# Patient Record
Sex: MALE | Race: WHITE | NOT HISPANIC OR LATINO | Employment: FULL TIME | URBAN - METROPOLITAN AREA
[De-identification: names, ages, dates, MRNs, and addresses within clinical notes are randomized per-mention and may not be internally consistent; named-entity substitution may affect disease eponyms.]

---

## 2017-01-16 DIAGNOSIS — G47.411 NARCOLEPSY WITH CATAPLEXY: ICD-10-CM

## 2017-01-16 RX ORDER — DEXTROAMPHETAMINE SACCHARATE, AMPHETAMINE ASPARTATE MONOHYDRATE, DEXTROAMPHETAMINE SULFATE AND AMPHETAMINE SULFATE 7.5; 7.5; 7.5; 7.5 MG/1; MG/1; MG/1; MG/1
CAPSULE, EXTENDED RELEASE ORAL
Qty: 180 CAP | Refills: 0 | Status: SHIPPED | OUTPATIENT
Start: 2017-01-16 | End: 2017-02-17 | Stop reason: SDUPTHER

## 2017-01-16 RX ORDER — DEXTROAMPHETAMINE SACCHARATE, AMPHETAMINE ASPARTATE MONOHYDRATE, DEXTROAMPHETAMINE SULFATE AND AMPHETAMINE SULFATE 7.5; 7.5; 7.5; 7.5 MG/1; MG/1; MG/1; MG/1
CAPSULE, EXTENDED RELEASE ORAL
Qty: 180 CAP | Refills: 0 | Status: SHIPPED | OUTPATIENT
Start: 2017-01-16 | End: 2017-05-05 | Stop reason: SDUPTHER

## 2017-01-16 RX ORDER — DEXTROAMPHETAMINE SACCHARATE, AMPHETAMINE ASPARTATE MONOHYDRATE, DEXTROAMPHETAMINE SULFATE AND AMPHETAMINE SULFATE 7.5; 7.5; 7.5; 7.5 MG/1; MG/1; MG/1; MG/1
CAPSULE, EXTENDED RELEASE ORAL
Qty: 180 CAP | Refills: 0 | OUTPATIENT
Start: 2017-01-16

## 2017-01-16 RX ORDER — ZOLPIDEM TARTRATE 12.5 MG/1
12.5 TABLET, FILM COATED, EXTENDED RELEASE ORAL NIGHTLY PRN
Qty: 30 TAB | Refills: 3 | OUTPATIENT
Start: 2017-01-16

## 2017-01-16 RX ORDER — ZOLPIDEM TARTRATE 12.5 MG/1
12.5 TABLET, FILM COATED, EXTENDED RELEASE ORAL NIGHTLY PRN
Qty: 30 TAB | Refills: 3 | Status: SHIPPED | OUTPATIENT
Start: 2017-01-16 | End: 2023-12-01

## 2017-01-16 RX ORDER — ZOLPIDEM TARTRATE 12.5 MG/1
12.5 TABLET, FILM COATED, EXTENDED RELEASE ORAL NIGHTLY PRN
Qty: 30 TAB | Refills: 3 | Status: SHIPPED | OUTPATIENT
Start: 2017-01-16 | End: 2017-05-11

## 2017-01-16 NOTE — TELEPHONE ENCOUNTER
Have we ever prescribed this med? Yes.  If yes, what date? 16    Last OV: 16    Next OV: 17    DX: Narcolepsy with Cataplexy     Medications:   Requested Prescriptions     Pending Prescriptions Disp Refills   • zolpidem (AMBIEN CR) 12.5 MG CR tablet 30 Tab 3     Sig: Take 1 Tab by mouth at bedtime as needed for Sleep (insomnia).   • amphetamine-dextroamphetamine ER (ADDERALL XR) 30 MG XR capsule 180 Cap 0     Si-3 tabs by mouth up to 2-3 times per day as needed for narcolepsy symptoms.     Pt will  both rxs/ap

## 2017-02-16 DIAGNOSIS — G47.411 NARCOLEPSY WITH CATAPLEXY: ICD-10-CM

## 2017-02-16 RX ORDER — DEXTROAMPHETAMINE SACCHARATE, AMPHETAMINE ASPARTATE MONOHYDRATE, DEXTROAMPHETAMINE SULFATE AND AMPHETAMINE SULFATE 7.5; 7.5; 7.5; 7.5 MG/1; MG/1; MG/1; MG/1
CAPSULE, EXTENDED RELEASE ORAL
Qty: 180 CAP | Refills: 0 | OUTPATIENT
Start: 2017-02-16

## 2017-02-17 DIAGNOSIS — G47.411 NARCOLEPSY WITH CATAPLEXY: ICD-10-CM

## 2017-02-17 RX ORDER — DEXTROAMPHETAMINE SACCHARATE, AMPHETAMINE ASPARTATE MONOHYDRATE, DEXTROAMPHETAMINE SULFATE AND AMPHETAMINE SULFATE 7.5; 7.5; 7.5; 7.5 MG/1; MG/1; MG/1; MG/1
CAPSULE, EXTENDED RELEASE ORAL
Qty: 180 CAP | Refills: 0 | Status: SHIPPED | OUTPATIENT
Start: 2017-02-17 | End: 2017-03-23 | Stop reason: SDUPTHER

## 2017-02-17 RX ORDER — DEXTROAMPHETAMINE SACCHARATE, AMPHETAMINE ASPARTATE MONOHYDRATE, DEXTROAMPHETAMINE SULFATE AND AMPHETAMINE SULFATE 7.5; 7.5; 7.5; 7.5 MG/1; MG/1; MG/1; MG/1
CAPSULE, EXTENDED RELEASE ORAL
Qty: 180 CAP | Refills: 0 | Status: SHIPPED
Start: 2017-02-17 | End: 2017-02-17 | Stop reason: SDUPTHER

## 2017-02-17 NOTE — TELEPHONE ENCOUNTER
Caller Name: Johny Jim Gannon                 Call Back Number: [phone] (home) [phone] (work)        Patient approves a detailed voicemail message: yes    Have we ever prescribed this med? Yes.  If yes, what date? 1/16/17    Last OV: 9/28/16    Next OV: 3/29/17    DX: narcolepsy with cataplexy    Medications:  Current Outpatient Prescriptions   Medication Sig Dispense Refill   • amphetamine-dextroamphetamine ER (ADDERALL XR) 30 MG XR capsule 2-3 tabs by mouth up to 2-3 times per day as needed for narcolepsy symptoms. 180 Cap 0   • zolpidem (AMBIEN CR) 12.5 MG CR tablet Take 1 Tab by mouth at bedtime as needed for Sleep (insomnia). 30 Tab 3   • amphetamine-dextroamphetamine ER (ADDERALL XR) 30 MG XR capsule 2-3 tabs by mouth up to 2-3 times per day as needed for narcolepsy symptoms. 180 Cap 0   • zolpidem (AMBIEN CR) 12.5 MG CR tablet Take 1 Tab by mouth at bedtime as needed for Sleep (insomnia). 30 Tab 3   • amphetamine-dextroamphetamine ER (ADDERALL XR) 30 MG XR capsule Take 2-3 tabs by mouth daily 3 times daily as needed for narcolepsy symptoms. 180 Cap 0   • atorvastatin (LIPITOR) 40 MG Tab Take 40 mg by mouth every evening.     • carvedilol (COREG) 25 MG Tab Take 25 mg by mouth 2 times a day, with meals.     • warfarin (COUMADIN) 7.5 MG Tab Take 7.5 mg by mouth every day.     • amlodipine (NORVASC) 10 MG TABS TAKE 1 TABLET BY MOUTH EVERY DAY 30 Tab 6   • simvastatin (ZOCOR) 40 MG TABS TAKE 1 TABLET BY MOUTH AT BEDTIME 90 Tab 2   • enalapril (VASOTEC) 20 MG tablet TAKE 1 TABLET BY MOUTH TWICE A DAY 60 Tab 3   • aspirin EC (ECOTRIN) 81 MG TBEC Take 1 Tab by mouth every day. 30 Each 11   • enalapril (VASOTEC) 10 MG TABS Take 20 mg by mouth 2 times a day.       No current facility-administered medications for this visit.

## 2017-03-23 DIAGNOSIS — G47.411 NARCOLEPSY WITH CATAPLEXY: ICD-10-CM

## 2017-03-23 NOTE — TELEPHONE ENCOUNTER
Have we ever prescribed this med? Yes.  If yes, what date? 2/17/2017    Last OV: 9/28/2016    Next OV: 3/29/2017    DX: Narcolepsy with Cataplexy    Medications: Adderall

## 2017-03-24 RX ORDER — DEXTROAMPHETAMINE SACCHARATE, AMPHETAMINE ASPARTATE MONOHYDRATE, DEXTROAMPHETAMINE SULFATE AND AMPHETAMINE SULFATE 7.5; 7.5; 7.5; 7.5 MG/1; MG/1; MG/1; MG/1
CAPSULE, EXTENDED RELEASE ORAL
Qty: 180 CAP | Refills: 0 | Status: SHIPPED | OUTPATIENT
Start: 2017-03-24 | End: 2017-05-11

## 2017-03-29 ENCOUNTER — SLEEP CENTER VISIT (OUTPATIENT)
Dept: SLEEP MEDICINE | Facility: MEDICAL CENTER | Age: 47
End: 2017-03-29
Payer: COMMERCIAL

## 2017-03-29 VITALS
TEMPERATURE: 98.1 F | BODY MASS INDEX: 25.71 KG/M2 | WEIGHT: 160 LBS | RESPIRATION RATE: 16 BRPM | HEIGHT: 66 IN | HEART RATE: 76 BPM | OXYGEN SATURATION: 96 % | DIASTOLIC BLOOD PRESSURE: 76 MMHG | SYSTOLIC BLOOD PRESSURE: 118 MMHG

## 2017-03-29 DIAGNOSIS — G47.411 NARCOLEPSY WITH CATAPLEXY: ICD-10-CM

## 2017-03-29 PROCEDURE — 99213 OFFICE O/P EST LOW 20 MIN: CPT | Performed by: INTERNAL MEDICINE

## 2017-03-29 RX ORDER — DEXTROAMPHETAMINE SACCHARATE, AMPHETAMINE ASPARTATE MONOHYDRATE, DEXTROAMPHETAMINE SULFATE AND AMPHETAMINE SULFATE 7.5; 7.5; 7.5; 7.5 MG/1; MG/1; MG/1; MG/1
CAPSULE, EXTENDED RELEASE ORAL
Qty: 180 CAP | Refills: 0 | Status: SHIPPED | OUTPATIENT
Start: 2017-03-29 | End: 2017-05-11

## 2017-03-29 NOTE — PROGRESS NOTES
"Chief Complaint   Patient presents with   • Follow-Up     6 mon     HPI: This patient is a 46 y.o. Male who presents for follow-up of narcolepsy with cataplexy. He uses Adderall XR 30 mg 2-3 tablets twice a day to 3 times a day. He is able to stay alert at work without difficulty during normal work shifts. He has been experiencing increased sleep paralysis. Denies overt cataplexy episodes however has had increased sensations of feeling weak when he has a \"déjà vu\" episode. He obtains 8 hours of sleep at night. He periodically uses Ambien CR. He denies palpitations or chest discomfort.  He has a history of cardiomyopathy and PE, status post pacemaker and defibrillator and follows with Dr. Maharaj, with significant improvement in his cardiac function per echocardiography from 15-50%. He has been removed from the cardiac transplant list.      Past Medical History   Diagnosis Date   • Congestive heart failure (CMS-HCC)    • Hypertension    • Bronchitis    • Fall    • Narcolepsy with cataplexy    • STEMI (ST elevation myocardial infarction) pk  trop 150 POBA LAD and DIAG med rx. 12/18/2010   • Heart valve disease    • Abnormal electrocardiogram 7/27/2011   • Chronic systolic congestive heart failure (CMS-HCC) 7/27/2011   • Dizziness 3/31/2011   • Male erectile disorder 8/9/2011   • Mixed hyperlipidemia 8/9/2011   • Hyperlipidemia    • Pulmonary embolism (CMS-HCC)        Social History     Social History   • Marital Status:      Spouse Name: N/A   • Number of Children: N/A   • Years of Education: N/A     Occupational History   • Not on file.     Social History Main Topics   • Smoking status: Former Smoker -- 0.50 packs/day for 15 years     Types: Cigarettes     Quit date: 12/02/2010   • Smokeless tobacco: Never Used      Comment: 1/2 pack/day   • Alcohol Use: 0.0 oz/week     0 Standard drinks or equivalent per week      Comment: occasional   • Drug Use: No   • Sexual Activity: Not on file     Other Topics " Concern   • Not on file     Social History Narrative       History reviewed. No pertinent family history.    Current Outpatient Prescriptions on File Prior to Visit   Medication Sig Dispense Refill   • amphetamine-dextroamphetamine ER (ADDERALL XR) 30 MG XR capsule 2-3 tabs by mouth up to 2-3 times per day as needed for narcolepsy symptoms. 180 Cap 0   • amphetamine-dextroamphetamine ER (ADDERALL XR) 30 MG XR capsule 2-3 tabs by mouth up to 2-3 times per day as needed for narcolepsy symptoms. 180 Cap 0   • zolpidem (AMBIEN CR) 12.5 MG CR tablet Take 1 Tab by mouth at bedtime as needed for Sleep (insomnia). 30 Tab 3   • zolpidem (AMBIEN CR) 12.5 MG CR tablet Take 1 Tab by mouth at bedtime as needed for Sleep (insomnia). 30 Tab 3   • atorvastatin (LIPITOR) 40 MG Tab Take 40 mg by mouth every evening.     • carvedilol (COREG) 25 MG Tab Take 25 mg by mouth 2 times a day, with meals.     • warfarin (COUMADIN) 7.5 MG Tab Take 7.5 mg by mouth every day.     • amlodipine (NORVASC) 10 MG TABS TAKE 1 TABLET BY MOUTH EVERY DAY 30 Tab 6   • simvastatin (ZOCOR) 40 MG TABS TAKE 1 TABLET BY MOUTH AT BEDTIME 90 Tab 2   • aspirin EC (ECOTRIN) 81 MG TBEC Take 1 Tab by mouth every day. 30 Each 11   • enalapril (VASOTEC) 20 MG tablet TAKE 1 TABLET BY MOUTH TWICE A DAY 60 Tab 3   • enalapril (VASOTEC) 10 MG TABS Take 20 mg by mouth 2 times a day.       No current facility-administered medications on file prior to visit.       Allergies: Review of patient's allergies indicates no known allergies.    ROS:   Constitutional: Denies fevers, chills, night sweats, fatigue or weight loss  Eyes: Denies vision loss, pain, drainage, double vision  Ears, Nose, Throat: Denies earache, difficulty hearing, tinnitus, nasal congestion, hoarseness  Cardiovascular: Denies chest pain, tightness, palpitations, orthopnea or edema  Respiratory: Denies shortness of breath, cough, wheezing, hemoptysis  Sleep: As in history of present illness  GI: Denies  "heartburn, dysphagia, nausea, abdominal pain, diarrhea or constipation  : Denies frequent urination, hematuria, discharge or painful urination  Musculoskeletal: Denies back pain, painful joints, sore muscles  Neurological: Denies weakness or headaches  Skin: No rashes    Blood pressure 118/76, pulse 76, temperature 36.7 °C (98.1 °F), resp. rate 16, height 1.676 m (5' 5.98\"), weight 72.576 kg (160 lb), SpO2 96 %.  Multi-Ox Readings  Multi Ox #1     O2 sat % at rest     O2 sat % on exertion     O2 sat average on exertion     Multi Ox #2     O2 sat % at rest     O2 sat % on exertion     O2 sat average on exertion       Oxygen Use     Oxygen Frequency     Duration of need     Is the patient mobile within the home?     CPAP Use?     BIPAP Use?     Servo Titration         Physical Exam:  Appearance: Well-nourished, well-developed, in no acute distress  HEENT: Normocephalic, atraumatic, white sclera, PERRLA, oropharynx clear  Neck: No adenopathy or masses  Respiratory: no intercostal retractions or accessory muscle use  Lungs auscultation: Clear to auscultation bilaterally  Cardiovascular: Regular rate rhythm. No murmurs, rubs or gallops.  No LE edema  Abdomen: soft, nondistended  Gait: Normal  Digits: No clubbing, cyanosis  Motor: No focal deficits  Orientation: Oriented to time, person and place    Diagnosis:  1. Narcolepsy with cataplexy  amphetamine-dextroamphetamine ER (ADDERALL XR) 30 MG XR capsule       Plan:  Johny is experiencing increased sleep paralysis, ? Increased cataplexy. He would be a candidate for Xyrem however is reluctant to start any new narcolepsy medications at this time, and we will continue to monitor clinically. He was encouraged to contact me should symptoms worsen.  Continue Adderall at above doses with prescription provided.  Maintain good sleep hygiene, with 8 hours of sleep nightly.  Return in about 6 months (around 9/29/2017).        "

## 2017-03-29 NOTE — MR AVS SNAPSHOT
"        Johny Toledo   3/29/2017 12:40 PM   Sleep Center Visit   MRN: [id number]    Department:  [facility]   Dept Phone:  [phone]    Description:  Male : 1970   Provider:  Sumi Sharp M.D.           Reason for Visit     Follow-Up 6 mon      Allergies as of 3/29/2017     No Known Allergies      You were diagnosed with     Narcolepsy with cataplexy   [[id number]]         Vital Signs     Blood Pressure Pulse Temperature Respirations Height Weight    118/76 mmHg 76 36.7 °C (98.1 °F) 16 1.676 m (5' 5.98\") 72.576 kg (160 lb)    Body Mass Index Oxygen Saturation Smoking Status             25.84 kg/m2 96% Former Smoker         Basic Information     Date Of Birth Sex Race Ethnicity Preferred Language    1970 Male  Non- English      Your appointments     Sep 29, 2017 12:40 PM   Follow UP with Sumi Sharp M.D.   [facility] (--)    [address] [address]   [phone]              Problem List              ICD-10-CM Priority Class Noted - Resolved    STEMI (ST elevation myocardial infarction) pk  trop 150 POBA LAD and DIAG med rx. I21.3   2010 - Present    Cardiomyopathy, nonischemic EF 15% no ascvd, query spasm vs. intracornary thrombus. I42.9   2010 - Present    Sleep apnea G47.30   2010 - Present    Narcolepsy with cataplexy G47.411   2010 - Present    HTN (hypertension) I10   2010 - Present    Abnormal electrocardiogram (Chronic) R94.31   2011 - Present    Chronic systolic congestive heart failure (CMS-HCC) (Chronic) I50.22   2011 - Present    Dizziness (Chronic) R42   3/31/2011 - Present    Male erectile disorder (Chronic) N52.9   2011 - Present    Mixed hyperlipidemia (Chronic) E78.2   2011 - Present    CAD (coronary artery disease) I25.10   2012 - Present      Health Maintenance        Date Due Completion Dates    IMM DTaP/Tdap/Td Vaccine (1 - Tdap) 1989 ---    IMM " INFLUENZA (1) 9/1/2016 10/4/2009            Current Immunizations     Influenza TIV (IM) 10/4/2009    Pneumococcal polysaccharide vaccine (PPSV-23) 8/2/2011  9:58 PM      Below and/or attached are the medications your provider expects you to take. Review all of your home medications and newly ordered medications with your provider and/or pharmacist. Follow medication instructions as directed by your provider and/or pharmacist. Please keep your medication list with you and share with your provider. Update the information when medications are discontinued, doses are changed, or new medications (including over-the-counter products) are added; and carry medication information at all times in the event of emergency situations     Allergies:  No Known Allergies          Medications  Valid as of: March 29, 2017 - 12:54 PM    Generic Name Brand Name Tablet Size Instructions for use    AmLODIPine Besylate (Tab) NORVASC 10 MG TAKE 1 TABLET BY MOUTH EVERY DAY        Amphetamine-Dextroamphetamine (CAPSULE SR 24 HR) ADDERALL XR 30 MG 2–3 tabs by mouth up to 2–3 times per day as needed for narcolepsy symptoms.        Amphetamine-Dextroamphetamine (CAPSULE SR 24 HR) ADDERALL XR 30 MG 2–3 tabs by mouth up to 2–3 times per day as needed for narcolepsy symptoms.        Amphetamine-Dextroamphetamine (CAPSULE SR 24 HR) ADDERALL XR 30 MG Take 2–3 tabs by mouth daily 3 times daily as needed for narcolepsy symptoms.        Aspirin (Tablet Delayed Response) ECOTRIN 81 MG Take 1 Tab by mouth every day.        Atorvastatin Calcium (Tab) LIPITOR 40 MG Take 40 mg by mouth every evening.        Carvedilol (Tab) COREG 25 MG Take 25 mg by mouth 2 times a day, with meals.        Enalapril Maleate (Tab) VASOTEC 10 MG Take 20 mg by mouth 2 times a day.        Enalapril Maleate (Tab) VASOTEC 20 MG TAKE 1 TABLET BY MOUTH TWICE A DAY        Simvastatin (Tab) ZOCOR 40 MG TAKE 1 TABLET BY MOUTH AT BEDTIME        Warfarin Sodium (Tab) COUMADIN 7.5 MG Take  7.5 mg by mouth every day.        Zolpidem Tartrate (Tab CR) AMBIEN CR 12.5 MG Take 1 Tab by mouth at bedtime as needed for Sleep (insomnia).        Zolpidem Tartrate (Tab CR) AMBIEN CR 12.5 MG Take 1 Tab by mouth at bedtime as needed for Sleep (insomnia).        .                 Medicines prescribed today were sent to:     [id number] - [place], [place] - [address] AT [address] [place] [address]    Phone: [phone] Fax: [phone]    Open 24 Hours?: No      Medication refill instructions:       If your prescription bottle indicates you have medication refills left, it is not necessary to call your provider’s office. Please contact your pharmacy and they will refill your medication.    If your prescription bottle indicates you do not have any refills left, you may request refills at any time through one of the following ways: The online TheReadingRoom system (except Urgent Care), by calling your provider’s office, or by asking your pharmacy to contact your provider’s office with a refill request. Medication refills are processed only during regular business hours and may not be available until the next business day. Your provider may request additional information or to have a follow-up visit with you prior to refilling your medication.   *Please Note: Medication refills are assigned a new Rx number when refilled electronically. Your pharmacy may indicate that no refills were authorized even though a new prescription for the same medication is available at the pharmacy. Please request the medicine by name with the pharmacy before contacting your provider for a refill.           TheReadingRoom Access Code: [email or web address]  Expires: 3/31/2017  3:14 PM    Your email address is not on file at [facility].  Email Addresses are required for you to sign up for TheReadingRoom, please contact [phone] to verify your personal information and to provide your email address prior to  attempting to register for TherapeuticsMDt.    Johny Toledo  [address] [place] [address]    Zoomabethart  A secure, online tool to manage your health information     [employer]’s TherapeuticsMDt® is a secure, online tool that connects you to your personalized health information from the privacy of your home -- day or night - making it very easy for you to manage your healthcare. Once the activation process is completed, you can even access your medical information using the [employer] elva, which is available for free in the Apple Elva store or Google Play store.     To learn more about [employer], visit [email or web address]    There are two levels of access available (as shown below):   My Chart Features  [facility] Primary Care Doctor [facility]  Specialists [facility]  Urgent  Care Non-[facility] Primary Care Doctor   Email your healthcare team securely and privately 24/7 X X X    Manage appointments: schedule your next appointment; view details of past/upcoming appointments X      Request prescription refills. X      View recent personal medical records, including lab and immunizations X X X X   View health record, including health history, allergies, medications X X X X   Read reports about your outpatient visits, procedures, consult and ER notes X X X X   See your discharge summary, which is a recap of your hospital and/or ER visit that includes your diagnosis, lab results, and care plan X X  X     How to register for TherapeuticsMDt:  Once your e-mail address has been verified, follow the following steps to sign up for TherapeuticsMDt.     1. Go to  [email or web address]  2. Click on the Sign Up Now box, which takes you to the New Member Sign Up page. You will need to provide the following information:  a. Enter your [employer] Access Code exactly as it appears at the top of this page. (You will not need to use this code after you’ve completed the sign-up process. If you do not sign up before the expiration date, you must request a new code.)   b. Enter  your date of birth.   c. Enter your home email address.   d. Click Submit, and follow the next screen’s instructions.  3. Create a [employer]ner ID. This will be your [employer] login ID and cannot be changed, so think of one that is secure and easy to remember.  4. Create a Virtual Call Centert password. You can change your password at any time.  5. Enter your Password Reset Question and Answer. This can be used at a later time if you forget your password.   6. Enter your e-mail address. This allows you to receive e-mail notifications when new information is available in [employer].  7. Click Sign Up. You can now view your health information.    For assistance activating your [employer] account, call [phone]

## 2017-04-15 ENCOUNTER — HOSPITAL ENCOUNTER (EMERGENCY)
Facility: MEDICAL CENTER | Age: 47
End: 2017-04-15
Payer: COMMERCIAL

## 2017-04-15 VITALS
RESPIRATION RATE: 16 BRPM | BODY MASS INDEX: 26.82 KG/M2 | HEIGHT: 66 IN | OXYGEN SATURATION: 98 % | DIASTOLIC BLOOD PRESSURE: 80 MMHG | SYSTOLIC BLOOD PRESSURE: 131 MMHG | TEMPERATURE: 98.3 F | WEIGHT: 166.89 LBS | HEART RATE: 79 BPM

## 2017-04-15 PROCEDURE — 302449 STATCHG TRIAGE ONLY (STATISTIC)

## 2017-04-15 ASSESSMENT — PAIN SCALES - GENERAL: PAINLEVEL_OUTOF10: 0

## 2017-04-16 NOTE — ED NOTES
Johny Jim Toledo  46 y.o. male  Chief Complaint   Patient presents with   • Coagulation Disorder     Increased INR (7.8) tonight. Pt taking new medication (fenofibrate).      Pt amb to triage with steady gait for above complaint. Pt is taking coumadin.   Pt denies headaches, GLF, or head trauma. No obvious signs of bleeding observed.  Pt is alert and oriented, speaking in full sentences, follows commands and responds appropriately to questions.  Pt placed in lobby. Pt educated on triage process. Pt encouraged to alert staff for any changes.

## 2017-05-05 DIAGNOSIS — G47.411 NARCOLEPSY WITH CATAPLEXY(347.01): ICD-10-CM

## 2017-05-05 RX ORDER — DEXTROAMPHETAMINE SACCHARATE, AMPHETAMINE ASPARTATE MONOHYDRATE, DEXTROAMPHETAMINE SULFATE AND AMPHETAMINE SULFATE 7.5; 7.5; 7.5; 7.5 MG/1; MG/1; MG/1; MG/1
CAPSULE, EXTENDED RELEASE ORAL
Qty: 180 CAP | Refills: 0 | Status: SHIPPED | OUTPATIENT
Start: 2017-05-05 | End: 2017-06-01 | Stop reason: SDUPTHER

## 2017-05-05 NOTE — TELEPHONE ENCOUNTER
Pt is requesting a refill of rx Adderall XR 30 MG.  Last prescribed 3/29/17    Last OV: 3/29/17  Next OV: 9/29/17  Narcolepsy with cataplexy  Adderall XR 30 MG

## 2017-05-11 ENCOUNTER — ANTICOAGULATION VISIT (OUTPATIENT)
Dept: VASCULAR LAB | Facility: MEDICAL CENTER | Age: 47
End: 2017-05-11
Attending: INTERNAL MEDICINE
Payer: COMMERCIAL

## 2017-05-11 DIAGNOSIS — I23.6 APICAL MURAL THROMBUS FOLLOWING MI (HCC): ICD-10-CM

## 2017-05-11 LAB — INR PPP: 2.6 (ref 2–3.5)

## 2017-05-11 PROCEDURE — 99213 OFFICE O/P EST LOW 20 MIN: CPT

## 2017-05-11 PROCEDURE — 85610 PROTHROMBIN TIME: CPT

## 2017-05-11 NOTE — PROGRESS NOTES
Anticoagulation Summary as of 5/11/2017     INR goal 2.0-3.0   Selected INR 2.6 (5/11/2017)   Maintenance plan 7.5 mg (7.5 mg x 1) on Sun, Thu; 3.75 mg (7.5 mg x 0.5) all other days   Weekly total 33.75 mg   Plan last modified Jayden Rothman, PHARMD (5/11/2017)   Next INR check 5/25/2017   Target end date Indefinite    Indications   Apical mural thrombus following MI [I51.3  I21.3]         Anticoagulation Episode Summary     INR check location     Preferred lab     Send INR reminders to     Comments       Anticoagulation Care Providers     Provider Role Specialty Phone number    EMI Jorgensen Referring Family Medicine [phone]    [facility] Responsible  [phone]        Anticoagulation Patient Findings    Pt is new to [facility], but not new to warfarin.  Discussed indication for warfarin therapy and INR goal range. Explained our services, hours of operation, warfarin therapy, potential SE, potential DI. Discussed diet at length, with an emphasis on foods rich in vitamin K.  Discussed monitoring parameters, such as blood in urine, blood in stool, discussed what to do if a dose is missed, or suspected as missed.  Emphasized importance of compliance.      Pt had recently seen the ER for an INR of 8.  Since [facility] had closed, the patient had a hard time obtaining help in dosing of his warfarin.  Also, pt was started on fenofibrate at that time that could have contributed to elevated INR. Pt has stopped fenofibrate and resumed his usual warfarin regimen for over 1 week.      Med rec performed, history of MI, continue ASA.    Patient's INR was therapeutic today in clinic.     Pt denies any unusual s/s of bleeding, bruising, clotting or any changes to diet or medications.  Confirmed dosing regimen.  Pt is to continue with current warfarin dosing regimen.  Follow up in 2 weeks as patient has been very stable on this dosing for years (per pt).  States he was seen every 4-5 weeks at [facility].      Added [facility] to care team    Jayden Rothman, PHARMD     CC: Dr Michael Bloch

## 2017-05-11 NOTE — MR AVS SNAPSHOT
Johny Toledo   2017 2:45 PM   Anticoagulation Visit   MRN: [id number]    Department:  Vascular Medicine   Dept Phone:  [phone]    Description:  Male : 1970   Provider:  V EXAM 5           Allergies as of 2017     No Known Allergies      You were diagnosed with     Apical mural thrombus following MI   [[id number]]         Vital Signs     Smoking Status                   Former Smoker           Basic Information     Date Of Birth Sex Race Ethnicity Preferred Language    1970 Male  Non- English      Your appointments     May 11, 2017  2:45 PM   New Patient with IHVH EXAM 5   [facility]  (--)    [address] [place] [address]   [phone]            May 25, 2017 11:15 AM   Established Patient with IHVH EXAM 4   [facility]  (--)    [address] [place] [address]   [phone]            Sep 29, 2017 12:40 PM   Follow UP with Sumi Sharp M.D.   [facility] (--)    [address]  [place] [id number] [phone]              Problem List              ICD-10-CM Priority Class Noted - Resolved    STEMI (ST elevation myocardial infarction) pk  trop 150 POBA LAD and DIAG med rx. I21.3   2010 - Present    Cardiomyopathy, nonischemic EF 15% no ascvd, query spasm vs. intracornary thrombus. I42.8   2010 - Present    Sleep apnea G47.30   2010 - Present    Narcolepsy with cataplexy G47.411   2010 - Present    HTN (hypertension) I10   2010 - Present    Abnormal electrocardiogram (Chronic) R94.31   2011 - Present    Chronic systolic congestive heart failure (CMS-HCC) (Chronic) I50.22   2011 - Present    Dizziness (Chronic) R42   3/31/2011 - Present    Male erectile disorder (Chronic) N52.9   2011 - Present    Mixed hyperlipidemia (Chronic) E78.2   2011 - Present    CAD  (coronary artery disease) I25.10   7/5/2012 - Present    Apical mural thrombus following MI I51.3, I21.3   5/11/2017 - Present      Health Maintenance        Date Due Completion Dates    IMM DTaP/Tdap/Td Vaccine (1 - Tdap) 8/18/1989 ---            Results     POCT Protime      Component    INR    2.6                        Current Immunizations     Influenza TIV (IM) 10/4/2009    Pneumococcal polysaccharide vaccine (PPSV-23) 8/2/2011  9:58 PM      Below and/or attached are the medications your provider expects you to take. Review all of your home medications and newly ordered medications with your provider and/or pharmacist. Follow medication instructions as directed by your provider and/or pharmacist. Please keep your medication list with you and share with your provider. Update the information when medications are discontinued, doses are changed, or new medications (including over-the-counter products) are added; and carry medication information at all times in the event of emergency situations     Allergies:  No Known Allergies          Medications  Valid as of: May 11, 2017 -  2:43 PM    Generic Name Brand Name Tablet Size Instructions for use    AmLODIPine Besylate (Tab) NORVASC 10 MG TAKE 1 TABLET BY MOUTH EVERY DAY        Amphetamine-Dextroamphetamine (CAPSULE SR 24 HR) ADDERALL XR 30 MG 2–3 tabs by mouth up to 2–3 times per day as needed for narcolepsy symptoms.        Amphetamine-Dextroamphetamine (CAPSULE SR 24 HR) ADDERALL XR 30 MG Take 2–3 tabs by mouth daily 3 times daily as needed for narcolepsy symptoms.        Amphetamine-Dextroamphetamine (CAPSULE SR 24 HR) ADDERALL XR 30 MG 2–3 tabs by mouth up to 2–3 times per day as needed for narcolepsy symptoms.        Aspirin (Tablet Delayed Response) ECOTRIN 81 MG Take 1 Tab by mouth every day.        Atorvastatin Calcium (Tab) LIPITOR 40 MG Take 40 mg by mouth every evening.        Carvedilol (Tab) COREG 25 MG Take 25 mg by mouth 2 times a day, with meals.           Enalapril Maleate (Tab) VASOTEC 10 MG Take 20 mg by mouth 2 times a day.        Enalapril Maleate (Tab) VASOTEC 20 MG TAKE 1 TABLET BY MOUTH TWICE A DAY        Simvastatin (Tab) ZOCOR 40 MG TAKE 1 TABLET BY MOUTH AT BEDTIME        Warfarin Sodium (Tab) COUMADIN 7.5 MG Take 7.5 mg by mouth every day.        Zolpidem Tartrate (Tab CR) AMBIEN CR 12.5 MG Take 1 Tab by mouth at bedtime as needed for Sleep (insomnia).        Zolpidem Tartrate (Tab CR) AMBIEN CR 12.5 MG Take 1 Tab by mouth at bedtime as needed for Sleep (insomnia).        .                 Medicines prescribed today were sent to:     [facility] #[id number] - [place], [place] - [address] AT [address] [place] [address]    Phone: [phone] Fax: [phone]    Open 24 Hours?: No      Medication refill instructions:       If your prescription bottle indicates you have medication refills left, it is not necessary to call your provider’s office. Please contact your pharmacy and they will refill your medication.    If your prescription bottle indicates you do not have any refills left, you may request refills at any time through one of the following ways: The online Beep system (except Urgent Care), by calling your provider’s office, or by asking your pharmacy to contact your provider’s office with a refill request. Medication refills are processed only during regular business hours and may not be available until the next business day. Your provider may request additional information or to have a follow-up visit with you prior to refilling your medication.   *Please Note: Medication refills are assigned a new Rx number when refilled electronically. Your pharmacy may indicate that no refills were authorized even though a new prescription for the same medication is available at the pharmacy. Please request the medicine by name with the pharmacy before contacting your provider for a refill.        Warfarin Dosing  Calendar   May 2017 Details    Sun Mon Tue Wed Thu Fri Sat      1               2               3               4               5               6                 7               8               9               10               11   2.6   7.5 mg   See details      12      3.75 mg         13      3.75 mg           14      7.5 mg         15      3.75 mg         16      3.75 mg         17      3.75 mg         18      7.5 mg         19      3.75 mg         20      3.75 mg           21      7.5 mg         22      3.75 mg         23      3.75 mg         24      3.75 mg         25      7.5 mg         26               27                 28               29               30               31                   Date Details   05/11 This INR check   INR: 2.6       Date of next INR:  5/25/2017         How to take your warfarin dose     To take:  3.75 mg Take 0.5 of a 7.5 mg tablet.    To take:  7.5 mg Take 1 of the 7.5 mg tablets.              [employer] Access Code: [email or web address]  Expires: 5/26/2017  1:12 PM    Your email address is not on file at [employer].  Email Addresses are required for you to sign up for [employer], please contact [phone] to verify your personal information and to provide your email address prior to attempting to register for [employer].    Johny Toledo  [address] [place] [address]    [employer]  A secure, online tool to manage your health information     [employer]’s [employer]® is a secure, online tool that connects you to your personalized health information from the privacy of your home -- day or night - making it very easy for you to manage your healthcare. Once the activation process is completed, you can even access your medical information using the [employer] elva, which is available for free in the Apple Elva store or Google Play store.     To learn more about [employer], visit [email or web address]    There are two levels of access available (as shown below):   My Chart Features  [facility]  Primary Care Doctor [facility]  Urgent  Care [facility] Primary Care Doctor   Email your healthcare team securely and privately 24/7 X X X    Manage appointments: schedule your next appointment; view details of past/upcoming appointments X      Request prescription refills. X      View recent personal medical records, including lab and immunizations X X X X   View health record, including health history, allergies, medications X X X X   Read reports about your outpatient visits, procedures, consult and ER notes X X X X   See your discharge summary, which is a recap of your hospital and/or ER visit that includes your diagnosis, lab results, and care plan X X  X     How to register for cottonTracks:  Once your e-mail address has been verified, follow the following steps to sign up for [employer]Tracks.     1. Go to  [email or web address]  2. Click on the Sign Up Now box, which takes you to the New Member Sign Up page. You will need to provide the following information:  a. Enter your cottonTracks Access Code exactly as it appears at the top of this page. (You will not need to use this code after you’ve completed the sign-up process. If you do not sign up before the expiration date, you must request a new code.)   b. Enter your date of birth.   c. Enter your home email address.   d. Click Submit, and follow the next screen’s instructions.  3. Create a cottonTracks ID. This will be your cottonTracks login ID and cannot be changed, so think of one that is secure and easy to remember.  4. Create a cottonTracks password. You can change your password at any time.  5. Enter your Password Reset Question and Answer. This can be used at a later time if you forget your password.   6. Enter your e-mail address. This allows you to receive e-mail notifications when new information is available in cottonTracks.  7. Click Sign Up. You can now view your health information.    For assistance activating your cottonTracks account, call [phone]

## 2017-05-15 LAB — INR BLD: 2.6 (ref 0.9–1.2)

## 2017-05-25 ENCOUNTER — ANTICOAGULATION VISIT (OUTPATIENT)
Dept: VASCULAR LAB | Facility: MEDICAL CENTER | Age: 47
End: 2017-05-25
Attending: INTERNAL MEDICINE
Payer: COMMERCIAL

## 2017-05-25 DIAGNOSIS — I23.6 APICAL MURAL THROMBUS FOLLOWING MI (HCC): ICD-10-CM

## 2017-05-25 LAB — INR PPP: 3 (ref 2–3.5)

## 2017-05-25 PROCEDURE — 99211 OFF/OP EST MAY X REQ PHY/QHP: CPT

## 2017-05-25 PROCEDURE — 85610 PROTHROMBIN TIME: CPT

## 2017-05-25 NOTE — PROGRESS NOTES
Anticoagulation Summary as of 5/25/2017     INR goal 2.0-3.0   Selected INR 3.0 (5/25/2017)   Maintenance plan 7.5 mg (7.5 mg x 1) on Sun, Thu; 3.75 mg (7.5 mg x 0.5) all other days   Weekly total 33.75 mg   Plan last modified Jayden Rothman, PHARMD (5/11/2017)   Next INR check 6/15/2017   Target end date Indefinite    Indications   Apical mural thrombus following MI [I51.3  I21.3]         Anticoagulation Episode Summary     INR check location     Preferred lab     Send INR reminders to     Comments       Anticoagulation Care Providers     Provider Role Specialty Phone number    EMI Jorgensen Referring Family Medicine [phone]    [facility] An[facility] Responsible  [phone]        Anticoagulation Patient Findings      Current Outpatient Prescriptions on File Prior to Visit   Medication Sig Dispense Refill   • amphetamine-dextroamphetamine ER (ADDERALL XR) 30 MG XR capsule 2-3 tabs by mouth up to 2-3 times per day as needed for narcolepsy symptoms. 180 Cap 0   • zolpidem (AMBIEN CR) 12.5 MG CR tablet Take 1 Tab by mouth at bedtime as needed for Sleep (insomnia). 30 Tab 3   • atorvastatin (LIPITOR) 40 MG Tab Take 40 mg by mouth every evening.     • carvedilol (COREG) 25 MG Tab Take 25 mg by mouth 2 times a day, with meals.     • warfarin (COUMADIN) 7.5 MG Tab Take 7.5 mg by mouth every day.     • amlodipine (NORVASC) 10 MG TABS TAKE 1 TABLET BY MOUTH EVERY DAY 30 Tab 6   • simvastatin (ZOCOR) 40 MG TABS TAKE 1 TABLET BY MOUTH AT BEDTIME 90 Tab 2   • enalapril (VASOTEC) 20 MG tablet TAKE 1 TABLET BY MOUTH TWICE A DAY 60 Tab 3   • aspirin EC (ECOTRIN) 81 MG TBEC Take 1 Tab by mouth every day. 30 Each 11   • enalapril (VASOTEC) 10 MG TABS Take 20 mg by mouth 2 times a day.       No current facility-administered medications on file prior to visit.       Lab Results   Component Value Date/Time    SODIUM 139 08/03/2011 03:00 AM    POTASSIUM 3.7 08/03/2011 03:00 AM    CHLORIDE 105 08/03/2011 03:00  AM    CO2 26 08/03/2011 03:00 AM    GLUCOSE 92 08/03/2011 03:00 AM    BUN 12 08/03/2011 03:00 AM    CREATININE 0.85 08/03/2011 03:00 AM        Johny Toledo seen in clinic today  INR  therapeutic.    Denies signs/symptoms of bleeding and/or thrombosis.    Denies changes to diet or medications.   Follow up appointment in 3 week(s).    Continue weekly warfarin dose as noted     Roger Richardson, PHARMD

## 2017-05-25 NOTE — MR AVS SNAPSHOT
Johny Toledo   2017 11:15 AM   Anticoagulation Visit   MRN: [id number]    Department:  Vascular Medicine   Dept Phone:  [phone]    Description:  Male : 1970   Provider:  V EXAM 4           Allergies as of 2017     No Known Allergies      You were diagnosed with     Apical mural thrombus following MI   [[id number]]         Vital Signs     Smoking Status                   Former Smoker           Basic Information     Date Of Birth Sex Race Ethnicity Preferred Language    1970 Male  Non- English      Your appointments     May 25, 2017 11:15 AM   Established Patient with IHV EXAM 4   [facility]  (--)    [address] [place] [address]   [phone]            2017 11:00 AM   Established Patient with IHV EXAM 4   [facility]  (--)    [address] [place] [address]   [phone]            Sep 29, 2017 12:40 PM   Follow UP with Sumi Sharp M.D.   [facility] (--)    [address] [place] [address] [phone]              Problem List              ICD-10-CM Priority Class Noted - Resolved    STEMI (ST elevation myocardial infarction) pk  trop 150 POBA LAD and DIAG med rx. I21.3   2010 - Present    Cardiomyopathy, nonischemic EF 15% no ascvd, query spasm vs. intracornary thrombus. I42.8   2010 - Present    Sleep apnea G47.30   2010 - Present    Narcolepsy with cataplexy G47.411   2010 - Present    HTN (hypertension) I10   2010 - Present    Abnormal electrocardiogram (Chronic) R94.31   2011 - Present    Chronic systolic congestive heart failure (CMS-HCC) (Chronic) I50.22   2011 - Present    Dizziness (Chronic) R42   3/31/2011 - Present    Male erectile disorder (Chronic) N52.9   2011 - Present    Mixed hyperlipidemia (Chronic) E78.2   2011 - Present      CAD (coronary artery disease) I25.10   7/5/2012 - Present    Apical mural thrombus following MI I51.3, I21.3   5/11/2017 - Present      Health Maintenance        Date Due Completion Dates    IMM DTaP/Tdap/Td Vaccine (1 - Tdap) 8/18/1989 ---            Results     POCT Protime      Component    INR    3.0                        Current Immunizations     Influenza TIV (IM) 10/4/2009    Pneumococcal polysaccharide vaccine (PPSV-23) 8/2/2011  9:58 PM      Below and/or attached are the medications your provider expects you to take. Review all of your home medications and newly ordered medications with your provider and/or pharmacist. Follow medication instructions as directed by your provider and/or pharmacist. Please keep your medication list with you and share with your provider. Update the information when medications are discontinued, doses are changed, or new medications (including over-the-counter products) are added; and carry medication information at all times in the event of emergency situations     Allergies:  No Known Allergies          Medications  Valid as of: May 25, 2017 - 10:51 AM    Generic Name Brand Name Tablet Size Instructions for use    AmLODIPine Besylate (Tab) NORVASC 10 MG TAKE 1 TABLET BY MOUTH EVERY DAY        Amphetamine-Dextroamphetamine (CAPSULE SR 24 HR) ADDERALL XR 30 MG 2–3 tabs by mouth up to 2–3 times per day as needed for narcolepsy symptoms.        Aspirin (Tablet Delayed Response) ECOTRIN 81 MG Take 1 Tab by mouth every day.        Atorvastatin Calcium (Tab) LIPITOR 40 MG Take 40 mg by mouth every evening.        Carvedilol (Tab) COREG 25 MG Take 25 mg by mouth 2 times a day, with meals.        Enalapril Maleate (Tab) VASOTEC 10 MG Take 20 mg by mouth 2 times a day.        Enalapril Maleate (Tab) VASOTEC 20 MG TAKE 1 TABLET BY MOUTH TWICE A DAY        Simvastatin (Tab) ZOCOR 40 MG TAKE 1 TABLET BY MOUTH AT BEDTIME        Warfarin Sodium (Tab) COUMADIN 7.5 MG Take 7.5 mg by mouth  every day.        Zolpidem Tartrate (Tab CR) AMBIEN CR 12.5 MG Take 1 Tab by mouth at bedtime as needed for Sleep (insomnia).        .                 Medicines prescribed today were sent to:     [facility]Y #[id number] - [place], [place] - [address] [place] [address]    Phone: [phone] Fax: [phone]    Open 24 Hours?: No      Medication refill instructions:       If your prescription bottle indicates you have medication refills left, it is not necessary to call your provider’s office. Please contact your pharmacy and they will refill your medication.    If your prescription bottle indicates you do not have any refills left, you may request refills at any time through one of the following ways: The online M_SOLUTION system (except Urgent Care), by calling your provider’s office, or by asking your pharmacy to contact your provider’s office with a refill request. Medication refills are processed only during regular business hours and may not be available until the next business day. Your provider may request additional information or to have a follow-up visit with you prior to refilling your medication.   *Please Note: Medication refills are assigned a new Rx number when refilled electronically. Your pharmacy may indicate that no refills were authorized even though a new prescription for the same medication is available at the pharmacy. Please request the medicine by name with the pharmacy before contacting your provider for a refill.        Warfarin Dosing Calendar   May 2017 Details    Sun Mon Tue Wed Thu Fri Sat      1               2               3               4               5               6                 7               8               9               10               11               12               13                 14               15               16               17               18               19               20                 21                22               23               24               25   3.0   7.5 mg   See details      26      3.75 mg         27      3.75 mg           28      7.5 mg         29      3.75 mg         30      3.75 mg         31      3.75 mg             Date Details   05/25 This INR check   INR: 3.0               How to take your warfarin dose     To take:  3.75 mg Take 0.5 of a 7.5 mg tablet.    To take:  7.5 mg Take 1 of the 7.5 mg tablets.           Warfarin Dosing Calendar   June 2017 Details    Sun Mon Tue Wed Thu Fri Sat         1      7.5 mg         2      3.75 mg         3      3.75 mg           4      7.5 mg         5      3.75 mg         6      3.75 mg         7      3.75 mg         8      7.5 mg         9      3.75 mg         10      3.75 mg           11      7.5 mg         12      3.75 mg         13      3.75 mg         14      3.75 mg         15      7.5 mg         16      3.75 mg         17      3.75 mg           18      7.5 mg         19      3.75 mg         20      3.75 mg         21      3.75 mg         22      7.5 mg         23               24                 25               26               27               28               29               30                 Date Details   No additional details    Date of next INR:  6/22/2017         How to take your warfarin dose     To take:  3.75 mg Take 0.5 of a 7.5 mg tablet.    To take:  7.5 mg Take 1 of the 7.5 mg tablets.              [employer] Access Code: [email or web address]  Expires: 5/26/2017  1:12 PM    Your email address is not on file at [employer].  Email Addresses are required for you to sign up for [employer], please contact [phone] to verify your personal information and to provide your email address prior to attempting to register for [employer].    Johny Toledo  [address] [place] [id number]    [employer]  A secure, online tool to manage your health information     [employer]’s [employer]® is a secure, online tool that connects you to your personalized  health information from the privacy of your home -- day or night - making it very easy for you to manage your healthcare. Once the activation process is completed, you can even access your medical information using the Trello elva, which is available for free in the Apple Elva store or Google Play store.     To learn more about Trello, visit [email or web address] [email or web address]    There are two levels of access available (as shown below):   My Chart Features  [facility] Primary Care Doctor [facility]  Specialists [facility]  Urgent  Care Non-[facility] Primary Care Doctor   Email your healthcare team securely and privately 24/7 X X X    Manage appointments: schedule your next appointment; view details of past/upcoming appointments X      Request prescription refills. X      View recent personal medical records, including lab and immunizations X X X X   View health record, including health history, allergies, medications X X X X   Read reports about your outpatient visits, procedures, consult and ER notes X X X X   See your discharge summary, which is a recap of your hospital and/or ER visit that includes your diagnosis, lab results, and care plan X X  X     How to register for [employer]:  Once your e-mail address has been verified, follow the following steps to sign up for [employer].     1. Go to  [email or web address]  2. Click on the Sign Up Now box, which takes you to the New Member Sign Up page. You will need to provide the following information:  a. Enter your Trello Access Code exactly as it appears at the top of this page. (You will not need to use this code after you’ve completed the sign-up process. If you do not sign up before the expiration date, you must request a new code.)   b. Enter your date of birth.   c. Enter your home email address.   d. Click Submit, and follow the next screen’s instructions.  3. Create a Trello ID. This will be your Trello login ID and cannot be changed, so think of one that is secure and easy to  remember.  4. Create a Thinker Thing password. You can change your password at any time.  5. Enter your Password Reset Question and Answer. This can be used at a later time if you forget your password.   6. Enter your e-mail address. This allows you to receive e-mail notifications when new information is available in [employer].  7. Click Sign Up. You can now view your health information.    For assistance activating your [employer] account, call [phone]

## 2017-05-30 LAB — INR BLD: 3 (ref 0.9–1.2)

## 2017-05-31 DIAGNOSIS — I23.6 APICAL MURAL THROMBUS FOLLOWING MI (HCC): ICD-10-CM

## 2017-06-01 DIAGNOSIS — G47.411 NARCOLEPSY WITH CATAPLEXY(347.01): ICD-10-CM

## 2017-06-01 RX ORDER — DEXTROAMPHETAMINE SACCHARATE, AMPHETAMINE ASPARTATE MONOHYDRATE, DEXTROAMPHETAMINE SULFATE AND AMPHETAMINE SULFATE 7.5; 7.5; 7.5; 7.5 MG/1; MG/1; MG/1; MG/1
CAPSULE, EXTENDED RELEASE ORAL
Qty: 180 CAP | Refills: 0 | Status: SHIPPED | OUTPATIENT
Start: 2017-06-01 | End: 2017-07-03 | Stop reason: SDUPTHER

## 2017-06-01 NOTE — TELEPHONE ENCOUNTER
Have we ever prescribed this med? Yes.  If yes, what date? 5/5/17    Last OV: 3/29/17    Next OV: 9/29/17    DX: Narcolepsy with cataplexy (G47.4110    Medications: amphetamine-dextroamphetamine ER (ADDERALL XR) 30 MG XR capsule

## 2017-06-07 ENCOUNTER — TELEPHONE (OUTPATIENT)
Dept: VASCULAR LAB | Facility: MEDICAL CENTER | Age: 47
End: 2017-06-07

## 2017-06-08 NOTE — TELEPHONE ENCOUNTER
Case discussed with Dr. Maharaj from [place] [facility]ology  She recommends indefinite anticoagulation given significant cardiomyopathy and history of apical thrombus. Will defer to that recommendation.    Will continue indefinite anti-coag  with warfarin as described in anticoagulation clinic notes.    We'll defer all other cardiovascular care to [facility]    Michael J. Bloch, MD  Anticoagulation Center    CC:  FAVIOLA Cagle

## 2017-06-22 ENCOUNTER — ANTICOAGULATION VISIT (OUTPATIENT)
Dept: VASCULAR LAB | Facility: MEDICAL CENTER | Age: 47
End: 2017-06-22
Attending: INTERNAL MEDICINE
Payer: COMMERCIAL

## 2017-06-22 DIAGNOSIS — I23.6 APICAL MURAL THROMBUS FOLLOWING MI (HCC): ICD-10-CM

## 2017-06-22 LAB
INR BLD: 2.8 (ref 0.9–1.2)
INR PPP: 2.8 (ref 2–3.5)

## 2017-06-22 PROCEDURE — 99211 OFF/OP EST MAY X REQ PHY/QHP: CPT

## 2017-06-22 NOTE — MR AVS SNAPSHOT
Johny Toledo   2017 11:00 AM   Anticoagulation Visit   MRN: [id number]    Department:  Vascular Medicine   Dept Phone:  [phone]    Description:  Male : 1970   Provider:  [facility] EXAM 4           Allergies as of 2017     No Known Allergies      You were diagnosed with     Apical mural thrombus following MI   [568375]         Vital Signs     Smoking Status                   Former Smoker           Basic Information     Date Of Birth Sex Race Ethnicity Preferred Language    1970 Male  Non- English      Your appointments     2017  9:45 AM   Established Patient with [facility] EXAM 5   [facility]  (--)    [address]  [place] [address]   [phone]            Sep 29, 2017 12:40 PM   Follow UP with Sumi Sharp M.D.   [facility] (--)    [address]  [place] [id number] [phone]              Problem List              ICD-10-CM Priority Class Noted - Resolved    STEMI (ST elevation myocardial infarction) pk  trop 150 POBA LAD and DIAG med rx. I21.3   2010 - Present    Cardiomyopathy, nonischemic EF 15% no ascvd, query spasm vs. intracornary thrombus. I42.8   2010 - Present    Sleep apnea G47.30   2010 - Present    Narcolepsy with cataplexy G47.411   2010 - Present    HTN (hypertension) I10   2010 - Present    Abnormal electrocardiogram (Chronic) R94.31   2011 - Present    Chronic systolic congestive heart failure (CMS-HCC) (Chronic) I50.22   2011 - Present    Dizziness (Chronic) R42   3/31/2011 - Present    Male erectile disorder (Chronic) N52.9   2011 - Present    Mixed hyperlipidemia (Chronic) E78.2   2011 - Present    CAD (coronary artery disease) I25.10   2012 - Present    Apical mural thrombus following MI I51.3, I21.3   2017 - Present      Health Maintenance        Date Due Completion Dates    IMM  DTaP/Tdap/Td Vaccine (1 - Tdap) 8/18/1989 ---            Results     PROTHROMBIN TIME      Component    INR    2.8                        Current Immunizations     Influenza TIV (IM) 10/4/2009    Pneumococcal polysaccharide vaccine (PPSV-23) 8/2/2011  9:58 PM      Below and/or attached are the medications your provider expects you to take. Review all of your home medications and newly ordered medications with your provider and/or pharmacist. Follow medication instructions as directed by your provider and/or pharmacist. Please keep your medication list with you and share with your provider. Update the information when medications are discontinued, doses are changed, or new medications (including over-the-counter products) are added; and carry medication information at all times in the event of emergency situations     Allergies:  No Known Allergies          Medications  Valid as of: June 22, 2017 - 11:04 AM    Generic Name Brand Name Tablet Size Instructions for use    AmLODIPine Besylate (Tab) NORVASC 10 MG TAKE 1 TABLET BY MOUTH EVERY DAY        Amphetamine-Dextroamphetamine (CAPSULE SR 24 HR) ADDERALL XR 30 MG 2–3 tabs by mouth up to 2–3 times per day as needed for narcolepsy symptoms.        Aspirin (Tablet Delayed Response) ECOTRIN 81 MG Take 1 Tab by mouth every day.        Atorvastatin Calcium (Tab) LIPITOR 40 MG Take 40 mg by mouth every evening.        Carvedilol (Tab) COREG 25 MG Take 25 mg by mouth 2 times a day, with meals.        Enalapril Maleate (Tab) VASOTEC 10 MG Take 20 mg by mouth 2 times a day.        Enalapril Maleate (Tab) VASOTEC 20 MG TAKE 1 TABLET BY MOUTH TWICE A DAY        Simvastatin (Tab) ZOCOR 40 MG TAKE 1 TABLET BY MOUTH AT BEDTIME        Warfarin Sodium (Tab) COUMADIN 7.5 MG Take 7.5 mg by mouth every day.        Zolpidem Tartrate (Tab CR) AMBIEN CR 12.5 MG Take 1 Tab by mouth at bedtime as needed for Sleep (insomnia).        .                 Medicines prescribed today were sent to:       [facility]/PHARMACY #[id number] - [place], [place] - [address] AT [address] [place] [address]    Phone: [phone] Fax: [phone]    Open 24 Hours?: No      Medication refill instructions:       If your prescription bottle indicates you have medication refills left, it is not necessary to call your provider’s office. Please contact your pharmacy and they will refill your medication.    If your prescription bottle indicates you do not have any refills left, you may request refills at any time through one of the following ways: The online [employer] system (except Urgent Care), by calling your provider’s office, or by asking your pharmacy to contact your provider’s office with a refill request. Medication refills are processed only during regular business hours and may not be available until the next business day. Your provider may request additional information or to have a follow-up visit with you prior to refilling your medication.   *Please Note: Medication refills are assigned a new Rx number when refilled electronically. Your pharmacy may indicate that no refills were authorized even though a new prescription for the same medication is available at the pharmacy. Please request the medicine by name with the pharmacy before contacting your provider for a refill.        Warfarin Dosing Calendar   June 2017 Details    Sun Mon Tue Wed Thu Fri Sat         1               2               3                 4               5               6               7               8               9               10                 11               12               13               14               15               16               17                 18               19               20               21               22   2.8   7.5 mg   See details      23      3.75 mg         24      3.75 mg           25      7.5 mg         26      3.75 mg         27      3.75 mg         28      3.75 mg         29       7.5 mg         30      3.75 mg           Date Details   06/22 This INR check   INR: 2.8               How to take your warfarin dose     To take:  3.75 mg Take 0.5 of a 7.5 mg tablet.    To take:  7.5 mg Take 1 of the 7.5 mg tablets.           Warfarin Dosing Calendar   July 2017 Details    Sun Mon Tue Wed Thu Fri Sat           1      3.75 mg           2      7.5 mg         3      3.75 mg         4      3.75 mg         5      3.75 mg         6      7.5 mg         7               8                 9               10               11               12               13               14               15                 16               17               18               19               20               21               22                 23               24               25               26               27               28               29                 30               31                     Date Details   No additional details    Date of next INR:  7/6/2017         How to take your warfarin dose     To take:  3.75 mg Take 0.5 of a 7.5 mg tablet.    To take:  7.5 mg Take 1 of the 7.5 mg tablets.              TheInfoPro Access Code: [email or web address]  Expires: 7/22/2017 11:04 AM    Your email address is not on file at [facility].  Email Addresses are required for you to sign up for TheInfoPro, please contact [phone] to verify your personal information and to provide your email address prior to attempting to register for TheInfoPro.    Johny Toledo  [address] [place] [id number]    TheInfoPro  A secure, online tool to manage your health information     [facility]’s TheInfoPro® is a secure, online tool that connects you to your personalized health information from the privacy of your home -- day or night - making it very easy for you to manage your healthcare. Once the activation process is completed, you can even access your medical information using the TheInfoPro elva, which is available for free in the Apple Elva  store or Google Play store.     To learn more about [email or web address], visit [email or web address]    There are two levels of access available (as shown below):   My Chart Features  [facility] Doctor [facility]  Specialists [facility]  Urgent  Care Non-[facility] Primary Care Doctor   Email your healthcare team securely and privately 24/7 X X X    Manage appointments: schedule your next appointment; view details of past/upcoming appointments X      Request prescription refills. X      View recent personal medical records, including lab and immunizations X X X X   View health record, including health history, allergies, medications X X X X   Read reports about your outpatient visits, procedures, consult and ER notes X X X X   See your discharge summary, which is a recap of your hospital and/or ER visit that includes your diagnosis, lab results, and care plan X X  X     How to register for [email or web address]:  Once your e-mail address has been verified, follow the following steps to sign up for [email or web address].     1. Go to  [email or web address]  2. Click on the Sign Up Now box, which takes you to the New Member Sign Up page. You will need to provide the following information:  a. Enter your jobsite123 Access Code exactly as it appears at the top of this page. (You will not need to use this code after you’ve completed the sign-up process. If you do not sign up before the expiration date, you must request a new code.)   b. Enter your date of birth.   c. Enter your home email address.   d. Click Submit, and follow the next screen’s instructions.  3. Create a jobsite123 ID. This will be your jobsite123 login ID and cannot be changed, so think of one that is secure and easy to remember.  4. Create a jobsite123 password. You can change your password at any time.  5. Enter your Password Reset Question and Answer. This can be used at a later time if you forget your password.   6. Enter your e-mail address. This allows you to receive e-mail notifications when  new information is available in BookitNow!.  7. Click Sign Up. You can now view your health information.    For assistance activating your BookitNow! account, call [phone]

## 2017-06-22 NOTE — PROGRESS NOTES
Anticoagulation Summary as of 6/22/2017     INR goal 2.0-3.0   Selected INR 2.8 (6/22/2017)   Maintenance plan 7.5 mg (7.5 mg x 1) on Sun, Thu; 3.75 mg (7.5 mg x 0.5) all other days   Weekly total 33.75 mg   Plan last modified Jayden Rothman, PHARMD (5/11/2017)   Next INR check 7/6/2017   Target end date Indefinite    Indications   Apical mural thrombus following MI [I51.3  I21.3]         Anticoagulation Episode Summary     INR check location     Preferred lab     Send INR reminders to     Comments       Anticoagulation Care Providers     Provider Role Specialty Phone number    EMI Jorgensen Referring [facility] Medicine [phone]    [facility] Anticoagulation Services Responsible  [phone]        Anticoagulation Patient Findings    Patient's INR was therapeutic today in clinic.    Pt denies any unusual s/s of bleeding, bruising, clotting or any changes to diet or medications.  Confirmed dosing regimen.  Pt plans to increase Vit K content significantly in his diet.  As such, suggest having pt return in 1 week, but earliest pt can return is 2 weeks.    Jayden Rothman, PHARMD

## 2017-07-03 DIAGNOSIS — G47.411 NARCOLEPSY WITH CATAPLEXY(347.01): ICD-10-CM

## 2017-07-03 RX ORDER — DEXTROAMPHETAMINE SACCHARATE, AMPHETAMINE ASPARTATE MONOHYDRATE, DEXTROAMPHETAMINE SULFATE AND AMPHETAMINE SULFATE 7.5; 7.5; 7.5; 7.5 MG/1; MG/1; MG/1; MG/1
CAPSULE, EXTENDED RELEASE ORAL
Qty: 180 CAP | Refills: 0 | Status: SHIPPED | OUTPATIENT
Start: 2017-07-03 | End: 2017-08-01 | Stop reason: SDUPTHER

## 2017-07-03 RX ORDER — DEXTROAMPHETAMINE SACCHARATE, AMPHETAMINE ASPARTATE MONOHYDRATE, DEXTROAMPHETAMINE SULFATE AND AMPHETAMINE SULFATE 7.5; 7.5; 7.5; 7.5 MG/1; MG/1; MG/1; MG/1
CAPSULE, EXTENDED RELEASE ORAL
Qty: 180 CAP | Refills: 0 | Status: SHIPPED | OUTPATIENT
Start: 2017-07-03 | End: 2017-09-07 | Stop reason: SDUPTHER

## 2017-07-03 NOTE — TELEPHONE ENCOUNTER
RX re-printed at pulmonary clinic and left at the  for pickup. I called patient to let him RX request completed and ready for pickup.

## 2017-07-03 NOTE — TELEPHONE ENCOUNTER
Caller Name: Johny Jim Gannon                 Call Back Number: [phone] (home) [phone] (work)        Patient approves a detailed voicemail message: yes    Have we ever prescribed this med? Yes.  If yes, what date? 6/1/17    Last OV: 3/29/17    Next OV: 9/29/17    DX: narcolepsy w/o cataplexy    Medications: Adderral ER   Current Outpatient Prescriptions   Medication Sig Dispense Refill   • amphetamine-dextroamphetamine ER (ADDERALL XR) 30 MG XR capsule 2-3 tabs by mouth up to 2-3 times per day as needed for narcolepsy symptoms. 180 Cap 0   • zolpidem (AMBIEN CR) 12.5 MG CR tablet Take 1 Tab by mouth at bedtime as needed for Sleep (insomnia). 30 Tab 3   • atorvastatin (LIPITOR) 40 MG Tab Take 40 mg by mouth every evening.     • carvedilol (COREG) 25 MG Tab Take 25 mg by mouth 2 times a day, with meals.     • warfarin (COUMADIN) 7.5 MG Tab Take 7.5 mg by mouth every day.     • amlodipine (NORVASC) 10 MG TABS TAKE 1 TABLET BY MOUTH EVERY DAY 30 Tab 6   • simvastatin (ZOCOR) 40 MG TABS TAKE 1 TABLET BY MOUTH AT BEDTIME 90 Tab 2   • enalapril (VASOTEC) 20 MG tablet TAKE 1 TABLET BY MOUTH TWICE A DAY 60 Tab 3   • aspirin EC (ECOTRIN) 81 MG TBEC Take 1 Tab by mouth every day. 30 Each 11   • enalapril (VASOTEC) 10 MG TABS Take 20 mg by mouth 2 times a day.       No current facility-administered medications for this visit.

## 2017-07-03 NOTE — TELEPHONE ENCOUNTER
Ellen did complete Rx at [facility], however pt would like to  at pulmonary on Wednesday. Please have a provider at pul sign Rx for Adderall and call pt for .

## 2017-07-13 ENCOUNTER — ANTICOAGULATION VISIT (OUTPATIENT)
Dept: VASCULAR LAB | Facility: MEDICAL CENTER | Age: 47
End: 2017-07-13
Attending: INTERNAL MEDICINE
Payer: COMMERCIAL

## 2017-07-13 VITALS — HEART RATE: 73 BPM | SYSTOLIC BLOOD PRESSURE: 134 MMHG | DIASTOLIC BLOOD PRESSURE: 84 MMHG

## 2017-07-13 DIAGNOSIS — I23.6 APICAL MURAL THROMBUS FOLLOWING MI (HCC): ICD-10-CM

## 2017-07-13 LAB — INR PPP: 4.1 (ref 2–3.5)

## 2017-07-13 PROCEDURE — 85610 PROTHROMBIN TIME: CPT

## 2017-07-13 PROCEDURE — 99212 OFFICE O/P EST SF 10 MIN: CPT

## 2017-07-13 NOTE — MR AVS SNAPSHOT
Johny Toledo   2017 9:30 AM   Anticoagulation Visit   MRN: [id number]    Department:  Vascular Medicine   Dept Phone:  [phone]    Description:  Male : 1970   Provider:  [facility] EXAM 4           Allergies as of 2017     No Known Allergies      You were diagnosed with     Apical mural thrombus following MI   [[id number]]         Vital Signs     Blood Pressure Pulse Smoking Status             134/84 mmHg 73 Former Smoker         Basic Information     Date Of Birth Sex Race Ethnicity Preferred Language    1970 Male  Non- English      Your appointments     2017  9:30 AM   Established Patient with [facility] EXAM 4   [facility]  (--)    [address]  [place] [address]   [phone]            2017 10:15 AM   Established Patient with [facility] EXAM 5   [facility]  (--)    [address] [place] [address]   [phone]            Sep 29, 2017 12:40 PM   Follow UP with Sumi Sharp M.D.   [facility] (--)    [address]  [place] [address] [phone]              Problem List              ICD-10-CM Priority Class Noted - Resolved    STEMI (ST elevation myocardial infarction) pk  trop 150 POBA LAD and DIAG med rx. I21.3   2010 - Present    Cardiomyopathy, nonischemic EF 15% no ascvd, query spasm vs. intracornary thrombus. I42.8   2010 - Present    Sleep apnea G47.30   2010 - Present    Narcolepsy with cataplexy G47.411   2010 - Present    HTN (hypertension) I10   2010 - Present    Abnormal electrocardiogram (Chronic) R94.31   2011 - Present    Chronic systolic congestive heart failure (CMS-HCC) (Chronic) I50.22   2011 - Present    Dizziness (Chronic) R42   3/31/2011 - Present    Male erectile disorder (Chronic) N52.9   2011 - Present    Mixed hyperlipidemia (Chronic)  E78.2   8/9/2011 - Present    CAD (coronary artery disease) I25.10   7/5/2012 - Present    Apical mural thrombus following MI I51.3, I21.3   5/11/2017 - Present      Health Maintenance        Date Due Completion Dates    IMM DTaP/Tdap/Td Vaccine (1 - Tdap) 8/18/1989 ---    IMM INFLUENZA (1) 9/1/2017 10/4/2009            Results     POCT Protime      Component    INR    4.1                        Current Immunizations     Influenza TIV (IM) 10/4/2009    Pneumococcal polysaccharide vaccine (PPSV-23) 8/2/2011  9:58 PM      Below and/or attached are the medications your provider expects you to take. Review all of your home medications and newly ordered medications with your provider and/or pharmacist. Follow medication instructions as directed by your provider and/or pharmacist. Please keep your medication list with you and share with your provider. Update the information when medications are discontinued, doses are changed, or new medications (including over-the-counter products) are added; and carry medication information at all times in the event of emergency situations     Allergies:  No Known Allergies          Medications  Valid as of: July 13, 2017 -  9:18 AM    Generic Name Brand Name Tablet Size Instructions for use    AmLODIPine Besylate (Tab) NORVASC 10 MG TAKE 1 TABLET BY MOUTH EVERY DAY        Amphetamine-Dextroamphetamine (CAPSULE SR 24 HR) ADDERALL XR 30 MG 2–3 tabs by mouth up to 2–3 times per day as needed for narcolepsy symptoms.        Amphetamine-Dextroamphetamine (CAPSULE SR 24 HR) ADDERALL XR 30 MG 2–3 tabs by mouth up to 2–3 times per day as needed for narcolepsy symptoms.        Aspirin (Tablet Delayed Response) ECOTRIN 81 MG Take 1 Tab by mouth every day.        Atorvastatin Calcium (Tab) LIPITOR 40 MG Take 40 mg by mouth every evening.        Carvedilol (Tab) COREG 25 MG Take 25 mg by mouth 2 times a day, with meals.        Enalapril Maleate (Tab) VASOTEC 20 MG TAKE 1 TABLET BY MOUTH TWICE A  DAY        Warfarin Sodium (Tab) COUMADIN 7.5 MG Take 7.5 mg by mouth every day.        Zolpidem Tartrate (Tab CR) AMBIEN CR 12.5 MG Take 1 Tab by mouth at bedtime as needed for Sleep (insomnia).        .                 Medicines prescribed today were sent to:     [facility]/PHARMACY #[id number] - [place], [place] - [address] AT [address] [place] [address]    Phone: [phone] Fax: [phone]    Open 24 Hours?: No      Medication refill instructions:       If your prescription bottle indicates you have medication refills left, it is not necessary to call your provider’s office. Please contact your pharmacy and they will refill your medication.    If your prescription bottle indicates you do not have any refills left, you may request refills at any time through one of the following ways: The online [employer] system (except Urgent Care), by calling your provider’s office, or by asking your pharmacy to contact your provider’s office with a refill request. Medication refills are processed only during regular business hours and may not be available until the next business day. Your provider may request additional information or to have a follow-up visit with you prior to refilling your medication.   *Please Note: Medication refills are assigned a new Rx number when refilled electronically. Your pharmacy may indicate that no refills were authorized even though a new prescription for the same medication is available at the pharmacy. Please request the medicine by name with the pharmacy before contacting your provider for a refill.        Warfarin Dosing Calendar   July 2017 Details    Sun Mon Tue Wed Thu Fri Sat           1                 2               3               4               5               6               7               8                 9               10               11               12               13   4.1   Hold   See details      14      3.75 mg         15      3.75 mg             16      7.5 mg         17      3.75 mg         18      3.75 mg         19      3.75 mg         20      7.5 mg         21      3.75 mg         22      3.75 mg           23      7.5 mg         24      3.75 mg         25      3.75 mg         26      3.75 mg         27      7.5 mg         28               29                 30               31                     Date Details   07/13 This INR check   INR: 4.1       Date of next INR:  7/27/2017         How to take your warfarin dose     To take:  3.75 mg Take 0.5 of a 7.5 mg tablet.    To take:  7.5 mg Take 1 of the 7.5 mg tablets.    Hold Do not take your warfarin dose. See the Details table to the right for additional instructions.                   SignStorey Access Code: [email or web address]  Expires: 7/22/2017 11:04 AM    Your email address is not on file at [facility].  Email Addresses are required for you to sign up for SignStorey, please contact [phone] to verify your personal information and to provide your email address prior to attempting to register for SignStorey.    Johny Toledo  [address] [place] [id number]    SignStorey  A secure, online tool to manage your health information     [facility]’s SignStorey® is a secure, online tool that connects you to your personalized health information from the privacy of your home -- day or night - making it very easy for you to manage your healthcare. Once the activation process is completed, you can even access your medical information using the SignStorey elva, which is available for free in the Apple Elva store or Google Play store.     To learn more about SignStorey, visit [email or web address]    There are two levels of access available (as shown below):   My Chart Features  [facility] Primary Care Doctor [facility]  Specialists [facility]  Urgent  Care Non-[facility] Primary Care Doctor   Email your healthcare team securely and privately 24/7 X X X    Manage appointments: schedule your next appointment; view details of  past/upcoming appointments X      Request prescription refills. X      View recent personal medical records, including lab and immunizations X X X X   View health record, including health history, allergies, medications X X X X   Read reports about your outpatient visits, procedures, consult and ER notes X X X X   See your discharge summary, which is a recap of your hospital and/or ER visit that includes your diagnosis, lab results, and care plan X X  X     How to register for Parsimotion:  Once your e-mail address has been verified, follow the following steps to sign up for Parsimotion.     1. Go to  [email or web address]  2. Click on the Sign Up Now box, which takes you to the New Member Sign Up page. You will need to provide the following information:  a. Enter your Parsimotion Access Code exactly as it appears at the top of this page. (You will not need to use this code after you’ve completed the sign-up process. If you do not sign up before the expiration date, you must request a new code.)   b. Enter your date of birth.   c. Enter your home email address.   d. Click Submit, and follow the next screen’s instructions.  3. Create a Parsimotion ID. This will be your Parsimotion login ID and cannot be changed, so think of one that is secure and easy to remember.  4. Create a Parsimotion password. You can change your password at any time.  5. Enter your Password Reset Question and Answer. This can be used at a later time if you forget your password.   6. Enter your e-mail address. This allows you to receive e-mail notifications when new information is available in Parsimotion.  7. Click Sign Up. You can now view your health information.    For assistance activating your Parsimotion account, call [phone]

## 2017-07-13 NOTE — PROGRESS NOTES
Anticoagulation Summary as of 7/13/2017     INR goal 2.0-3.0   Selected INR 4.1! (7/13/2017)   Maintenance plan 7.5 mg (7.5 mg x 1) on Sun, Thu; 3.75 mg (7.5 mg x 0.5) all other days   Weekly total 33.75 mg   Plan last modified Jayden Rothman, VEROD (5/11/2017)   Next INR check 7/27/2017   Target end date Indefinite    Indications   Apical mural thrombus following MI [I51.3  I21.3]         Anticoagulation Episode Summary     INR check location     Preferred lab     Send INR reminders to     Comments       Anticoagulation Care Providers     Provider Role Specialty Phone number    EMI Jorgensen Referring Family Medicine [phone]    [facility] Respons[facility]  [phone]        Anticoagulation Patient Findings   Negatives Missed Doses, Extra Doses, Medication Changes, Antibiotic Use, Diet Changes, Dental/Other Procedures, Hospitalization, Bleeding Gums, Nose Bleeds, Blood in Urine, Blood in Stool, Any Bruising, Other Complaints        Pt is supra therapeutic today.  He has recently returned from vacation and not been eating normally.  Confirmed no duplicate dosing, no ETOH.  Will HOLD dose tonight, then resume current dosing regimen. Medications reviewed.     Follow up in 2 weeks.     Peri George, PHARMD

## 2017-07-27 ENCOUNTER — ANTICOAGULATION VISIT (OUTPATIENT)
Dept: VASCULAR LAB | Facility: MEDICAL CENTER | Age: 47
End: 2017-07-27
Attending: INTERNAL MEDICINE
Payer: COMMERCIAL

## 2017-07-27 VITALS — DIASTOLIC BLOOD PRESSURE: 105 MMHG | HEART RATE: 82 BPM | SYSTOLIC BLOOD PRESSURE: 141 MMHG

## 2017-07-27 DIAGNOSIS — I23.6 APICAL MURAL THROMBUS FOLLOWING MI (HCC): ICD-10-CM

## 2017-07-27 LAB — INR PPP: 2.3 (ref 2–3.5)

## 2017-07-27 PROCEDURE — 85610 PROTHROMBIN TIME: CPT

## 2017-07-27 PROCEDURE — 99211 OFF/OP EST MAY X REQ PHY/QHP: CPT

## 2017-07-27 NOTE — PROGRESS NOTES
Anticoagulation Summary as of 7/27/2017     INR goal 2.0-3.0   Selected INR 2.3 (7/27/2017)   Maintenance plan 7.5 mg (7.5 mg x 1) on Sun, Thu; 3.75 mg (7.5 mg x 0.5) all other days   Weekly total 33.75 mg   Plan last modified Jayden Rothman, PHARMD (5/11/2017)   Next INR check 8/17/2017   Target end date Indefinite    Indications   Apical mural thrombus following MI [I51.3  I21.3]         Anticoagulation Episode Summary     INR check location     Preferred lab     Send INR reminders to     Comments       Anticoagulation Care Providers     Provider Role Specialty Phone number    EMI Jorgensen Referring [facility] Medicine [phone]    [facility] Anticoagulation Services Responsible  [phone]        Anticoagulation Patient Findings    Pt is therapeutic at 2.3.  Pt reports no bleeding, bruising, or changes in diet/medication.  Medication reconciliation performed.  Will continue current home regimen.  FU in 3 weeks.    Blaise Burrell, Pharm.D.  Peri George, FarzanehD

## 2017-07-27 NOTE — MR AVS SNAPSHOT
Johny Toledo   2017 10:15 AM   Anticoagulation Visit   MRN: [id number]    Department:  Vascular Medicine   Dept Phone:  [phone]    Description:  Male : 1970   Provider:  [facility] EXAM 5           Allergies as of 2017     No Known Allergies      You were diagnosed with     Apical mural thrombus following MI   [[id number]]         Vital Signs     Blood Pressure Pulse Smoking Status             141/105 mmHg 82 Former Smoker         Basic Information     Date Of Birth Sex Race Ethnicity Preferred Language    1970 Male  Non- English      Your appointments     Aug 17, 2017 10:30 AM   Established Patient with [facility] EXAM 5   [facility] for Heart and Vascular Health  (--)    [address]  [place] [address]   [phone]            Sep 29, 2017 12:40 PM   Follow UP with Sumi Sharp M.D.   [facility] (--)    [address]  [place] [id number] [phone]              Problem List              ICD-10-CM Priority Class Noted - Resolved    STEMI (ST elevation myocardial infarction) pk  trop 150 POBA LAD and DIAG med rx. I21.3   2010 - Present    Cardiomyopathy, nonischemic EF 15% no ascvd, query spasm vs. intracornary thrombus. I42.8   2010 - Present    Sleep apnea G47.30   2010 - Present    Narcolepsy with cataplexy G47.411   2010 - Present    HTN (hypertension) I10   2010 - Present    Abnormal electrocardiogram (Chronic) R94.31   2011 - Present    Chronic systolic congestive heart failure (CMS-HCC) (Chronic) I50.22   2011 - Present    Dizziness (Chronic) R42   3/31/2011 - Present    Male erectile disorder (Chronic) N52.9   2011 - Present    Mixed hyperlipidemia (Chronic) E78.2   2011 - Present    CAD (coronary artery disease) I25.10   2012 - Present    Apical mural thrombus following MI I51.3, I21.3   2017 - Present      Health Maintenance        Date  Due Completion Dates    IMM DTaP/Tdap/Td Vaccine (1 - Tdap) 8/18/1989 ---    IMM INFLUENZA (1) 9/1/2017 10/4/2009            Results     POCT Protime      Component    INR    2.3                        Current Immunizations     Influenza TIV (IM) 10/4/2009    Pneumococcal polysaccharide vaccine (PPSV-23) 8/2/2011  9:58 PM      Below and/or attached are the medications your provider expects you to take. Review all of your home medications and newly ordered medications with your provider and/or pharmacist. Follow medication instructions as directed by your provider and/or pharmacist. Please keep your medication list with you and share with your provider. Update the information when medications are discontinued, doses are changed, or new medications (including over-the-counter products) are added; and carry medication information at all times in the event of emergency situations     Allergies:  No Known Allergies          Medications  Valid as of: July 27, 2017 - 10:15 AM    Generic Name Brand Name Tablet Size Instructions for use    AmLODIPine Besylate (Tab) NORVASC 10 MG TAKE 1 TABLET BY MOUTH EVERY DAY        Amphetamine-Dextroamphetamine (CAPSULE SR 24 HR) ADDERALL XR 30 MG 2–3 tabs by mouth up to 2–3 times per day as needed for narcolepsy symptoms.        Amphetamine-Dextroamphetamine (CAPSULE SR 24 HR) ADDERALL XR 30 MG 2–3 tabs by mouth up to 2–3 times per day as needed for narcolepsy symptoms.        Aspirin (Tablet Delayed Response) ECOTRIN 81 MG Take 1 Tab by mouth every day.        Atorvastatin Calcium (Tab) LIPITOR 40 MG Take 40 mg by mouth every evening.        Carvedilol (Tab) COREG 25 MG Take 25 mg by mouth 2 times a day, with meals.        Enalapril Maleate (Tab) VASOTEC 20 MG TAKE 1 TABLET BY MOUTH TWICE A DAY        Warfarin Sodium (Tab) COUMADIN 7.5 MG Take 7.5 mg by mouth every day.        Zolpidem Tartrate (Tab CR) AMBIEN CR 12.5 MG Take 1 Tab by mouth at bedtime as needed for Sleep (insomnia).         .                 Medicines prescribed today were sent to:     [facility]/PHARMACY #[id number] - [place], [place] - [address] AT [place] [address] [address] [place] [address]    Phone: [phone] Fax: [phone]    Open 24 Hours?: No      Medication refill instructions:       If your prescription bottle indicates you have medication refills left, it is not necessary to call your provider’s office. Please contact your pharmacy and they will refill your medication.    If your prescription bottle indicates you do not have any refills left, you may request refills at any time through one of the following ways: The online [facility] system (except Urgent Care), by calling your provider’s office, or by asking your pharmacy to contact your provider’s office with a refill request. Medication refills are processed only during regular business hours and may not be available until the next business day. Your provider may request additional information or to have a follow-up visit with you prior to refilling your medication.   *Please Note: Medication refills are assigned a new Rx number when refilled electronically. Your pharmacy may indicate that no refills were authorized even though a new prescription for the same medication is available at the pharmacy. Please request the medicine by name with the pharmacy before contacting your provider for a refill.        Warfarin Dosing Calendar   July 2017 Details    Sun Mon Tue Wed Thu Fri Sat           1                 2               3               4               5               6               7               8                 9               10               11               12               13               14               15                 16               17               18               19               20               21               22                 23               24               25               26               27   2.3   7.5 mg   See details       28      3.75 mg         29      3.75 mg           30      7.5 mg         31      3.75 mg               Date Details   07/27 This INR check   INR: 2.3               How to take your warfarin dose     To take:  3.75 mg Take 0.5 of a 7.5 mg tablet.    To take:  7.5 mg Take 1 of the 7.5 mg tablets.           Warfarin Dosing Calendar   August 2017 Details    Sun Mon Tue Wed Thu Fri Sat       1      3.75 mg         2      3.75 mg         3      7.5 mg         4      3.75 mg         5      3.75 mg           6      7.5 mg         7      3.75 mg         8      3.75 mg         9      3.75 mg         10      7.5 mg         11               12                 13               14               15               16               17               18               19                 20               21               22               23               24               25               26                 27               28               29               30               31                  Date Details   No additional details    Date of next INR:  8/10/2017         How to take your warfarin dose     To take:  3.75 mg Take 0.5 of a 7.5 mg tablet.    To take:  7.5 mg Take 1 of the 7.5 mg tablets.              TechDevils Access Code: [email or web address]  Expires: 8/26/2017 10:15 AM    Your email address is not on file at [facility] [employer].  Email Addresses are required for you to sign up for TechDevils, please contact [phone] to verify your personal information and to provide your email address prior to attempting to register for TechDevils.    Johny Toledo  [address] [place] [id number]    TechDevils  A secure, online tool to manage your health information     [employer]’s TechDevils® is a secure, online tool that connects you to your personalized health information from the privacy of your home -- day or night - making it very easy for you to manage your healthcare. Once the activation process is completed, you can even access your medical  information using the Alcanzar [employer] elva, which is available for free in the Apple Elva store or Google Play store.     To learn more about Alcanzar Solar, visit [email or web address]    There are two levels of access available (as shown below):   My Chart Features  [facility] Primary [facility] Doctor [facility]  Specialists [facility]  Urgent  Care Non-[facility] Primary Care Doctor   Email your healthcare team securely and privately 24/7 X X X    Manage appointments: schedule your next appointment; view details of past/upcoming appointments X      Request prescription refills. X      View recent personal medical records, including lab and immunizations X X X X   View health record, including health history, allergies, medications X X X X   Read reports about your outpatient visits, procedures, consult and ER notes X X X X   See your discharge summary, which is a recap of your hospital and/or ER visit that includes your diagnosis, lab results, and care plan X X  X     How to register for Alcanzar Solar:  Once your e-mail address has been verified, follow the following steps to sign up for Alcanzar Solar.     1. Go to  [email or web address]  2. Click on the Sign Up Now box, which takes you to the New Member Sign Up page. You will need to provide the following information:  a. Enter your Alcanzar Solar Access Code exactly as it appears at the top of this page. (You will not need to use this code after you’ve completed the sign-up process. If you do not sign up before the expiration date, you must request a new code.)   b. Enter your date of birth.   c. Enter your home email address.   d. Click Submit, and follow the next screen’s instructions.  3. Create a Alcanzar Solar ID. This will be your Alcanzar Solar login ID and cannot be changed, so think of one that is secure and easy to remember.  4. Create a Alcanzar Solar password. You can change your password at any time.  5. Enter your Password Reset Question and Answer. This can be used at a later time if you forget your password.    6. Enter your e-mail address. This allows you to receive e-mail notifications when new information is available in [employer].  7. Click Sign Up. You can now view your health information.    For assistance activating your [employer] account, call [phone]

## 2017-07-28 LAB — INR BLD: 2.3 (ref 0.9–1.2)

## 2017-08-01 DIAGNOSIS — G47.411 NARCOLEPSY WITH CATAPLEXY(347.01): ICD-10-CM

## 2017-08-01 RX ORDER — DEXTROAMPHETAMINE SACCHARATE, AMPHETAMINE ASPARTATE MONOHYDRATE, DEXTROAMPHETAMINE SULFATE AND AMPHETAMINE SULFATE 7.5; 7.5; 7.5; 7.5 MG/1; MG/1; MG/1; MG/1
CAPSULE, EXTENDED RELEASE ORAL
Qty: 180 CAP | Refills: 0 | Status: SHIPPED | OUTPATIENT
Start: 2017-08-01 | End: 2017-09-29 | Stop reason: SDUPTHER

## 2017-08-01 NOTE — TELEPHONE ENCOUNTER
Caller Name: Johny Jim Gannon                 Call Back Number: [phone] (home) [phone] (work)        Patient approves a detailed voicemail message: yes    Have we ever prescribed this med? Yes.  If yes, what date? 7/3/17    Last OV: 3/29/17    Next OV: 9/29/17    DX: Narcolepsy with Cataplexy    Medications:  Adderral ER  Current Outpatient Prescriptions   Medication Sig Dispense Refill   • amphetamine-dextroamphetamine ER (ADDERALL XR) 30 MG XR capsule 2-3 tabs by mouth up to 2-3 times per day as needed for narcolepsy symptoms. 180 Cap 0   • amphetamine-dextroamphetamine ER (ADDERALL XR) 30 MG XR capsule 2-3 tabs by mouth up to 2-3 times per day as needed for narcolepsy symptoms. 180 Cap 0   • zolpidem (AMBIEN CR) 12.5 MG CR tablet Take 1 Tab by mouth at bedtime as needed for Sleep (insomnia). 30 Tab 3   • atorvastatin (LIPITOR) 40 MG Tab Take 40 mg by mouth every evening.     • carvedilol (COREG) 25 MG Tab Take 25 mg by mouth 2 times a day, with meals.     • warfarin (COUMADIN) 7.5 MG Tab Take 7.5 mg by mouth every day.     • amlodipine (NORVASC) 10 MG TABS TAKE 1 TABLET BY MOUTH EVERY DAY 30 Tab 6   • enalapril (VASOTEC) 20 MG tablet TAKE 1 TABLET BY MOUTH TWICE A DAY 60 Tab 3   • aspirin EC (ECOTRIN) 81 MG TBEC Take 1 Tab by mouth every day. 30 Each 11     No current facility-administered medications for this visit.

## 2017-08-17 ENCOUNTER — ANTICOAGULATION VISIT (OUTPATIENT)
Dept: VASCULAR LAB | Facility: MEDICAL CENTER | Age: 47
End: 2017-08-17
Attending: INTERNAL MEDICINE
Payer: COMMERCIAL

## 2017-08-17 VITALS — SYSTOLIC BLOOD PRESSURE: 137 MMHG | DIASTOLIC BLOOD PRESSURE: 103 MMHG | HEART RATE: 76 BPM

## 2017-08-17 DIAGNOSIS — I23.6 APICAL MURAL THROMBUS FOLLOWING MI (HCC): ICD-10-CM

## 2017-08-17 LAB — INR PPP: 2.6 (ref 2–3.5)

## 2017-08-17 PROCEDURE — 99211 OFF/OP EST MAY X REQ PHY/QHP: CPT

## 2017-08-17 PROCEDURE — 85610 PROTHROMBIN TIME: CPT

## 2017-08-17 NOTE — MR AVS SNAPSHOT
Johny Toledo   2017 10:30 AM   Anticoagulation Visit   MRN: [id number]    Department:  Vascular Medicine   Dept Phone:  [phone]    Description:  Male : 1970   Provider:  [facility] EXAM 5           Allergies as of 2017     No Known Allergies      You were diagnosed with     Apical mural thrombus following MI   [[id number]]         Vital Signs     Blood Pressure Pulse Smoking Status             137/103 mmHg 76 Former Smoker         Basic Information     Date Of Birth Sex Race Ethnicity Preferred Language    1970 Male  Non- English      Your appointments     Aug 17, 2017 10:30 AM   Established Patient with [facility] EXAM 5   [facility] for Heart and Vascular Health  (--)    [address]  [place] [address]   [phone]            Sep 28, 2017 10:15 AM   Established Patient with [facility] EXAM 4   [facility] Vascular Health  (--)    [address] [place] [address]   [phone]            Sep 29, 2017 12:40 PM   Follow UP with Sumi Sharp M.D.   [facility] (--)    [address]  [place] [id number] [phone]              Problem List              ICD-10-CM Priority Class Noted - Resolved    STEMI (ST elevation myocardial infarction) pk  trop 150 POBA LAD and DIAG med rx. I21.3   2010 - Present    Cardiomyopathy, nonischemic EF 15% no ascvd, query spasm vs. intracornary thrombus. I42.8   2010 - Present    Sleep apnea G47.30   2010 - Present    Narcolepsy with cataplexy G47.411   2010 - Present    HTN (hypertension) I10   2010 - Present    Abnormal electrocardiogram (Chronic) R94.31   2011 - Present    Chronic systolic congestive heart failure (CMS-HCC) (Chronic) I50.22   2011 - Present    Dizziness (Chronic) R42   3/31/2011 - Present    Male erectile disorder (Chronic) N52.9   2011 - Present    Mixed hyperlipidemia  (Chronic) E78.2   8/9/2011 - Present    CAD (coronary artery disease) I25.10   7/5/2012 - Present    Apical mural thrombus following MI I51.3, I21.3   5/11/2017 - Present      Health Maintenance        Date Due Completion Dates    IMM DTaP/Tdap/Td Vaccine (1 - Tdap) 8/18/1989 ---    IMM INFLUENZA (1) 9/1/2017 10/4/2009            Results     POCT Protime      Component    INR    2.6                        Current Immunizations     Influenza TIV (IM) 10/4/2009    Pneumococcal polysaccharide vaccine (PPSV-23) 8/2/2011  9:58 PM      Below and/or attached are the medications your provider expects you to take. Review all of your home medications and newly ordered medications with your provider and/or pharmacist. Follow medication instructions as directed by your provider and/or pharmacist. Please keep your medication list with you and share with your provider. Update the information when medications are discontinued, doses are changed, or new medications (including over-the-counter products) are added; and carry medication information at all times in the event of emergency situations     Allergies:  No Known Allergies          Medications  Valid as of: August 17, 2017 - 10:14 AM    Generic Name Brand Name Tablet Size Instructions for use    AmLODIPine Besylate (Tab) NORVASC 10 MG TAKE 1 TABLET BY MOUTH EVERY DAY        Amphetamine-Dextroamphetamine (CAPSULE SR 24 HR) ADDERALL XR 30 MG 2–3 tabs by mouth up to 2–3 times per day as needed for narcolepsy symptoms.        Amphetamine-Dextroamphetamine (CAPSULE SR 24 HR) ADDERALL XR 30 MG 2–3 tabs by mouth up to 2–3 times per day as needed for narcolepsy symptoms.        Aspirin (Tablet Delayed Response) ECOTRIN 81 MG Take 1 Tab by mouth every day.        Atorvastatin Calcium (Tab) LIPITOR 40 MG Take 40 mg by mouth every evening.        Carvedilol (Tab) COREG 25 MG Take 25 mg by mouth 2 times a day, with meals.        Enalapril Maleate (Tab) VASOTEC 20 MG TAKE 1 TABLET BY  MOUTH TWICE A DAY        Warfarin Sodium (Tab) COUMADIN 7.5 MG Take 7.5 mg by mouth every day.        Zolpidem Tartrate (Tab CR) AMBIEN CR 12.5 MG Take 1 Tab by mouth at bedtime as needed for Sleep (insomnia).        .                 Medicines prescribed today were sent to:     [facility]/PHARMACY #[id number] - [place], [place] - [address] AT [address] [place] [address]    Phone: [phone] Fax: [phone]    Open 24 Hours?: No      Medication refill instructions:       If your prescription bottle indicates you have medication refills left, it is not necessary to call your provider’s office. Please contact your pharmacy and they will refill your medication.    If your prescription bottle indicates you do not have any refills left, you may request refills at any time through one of the following ways: The online [employer] system (except Urgent Care), by calling your provider’s office, or by asking your pharmacy to contact your provider’s office with a refill request. Medication refills are processed only during regular business hours and may not be available until the next business day. Your provider may request additional information or to have a follow-up visit with you prior to refilling your medication.   *Please Note: Medication refills are assigned a new Rx number when refilled electronically. Your pharmacy may indicate that no refills were authorized even though a new prescription for the same medication is available at the pharmacy. Please request the medicine by name with the pharmacy before contacting your provider for a refill.        Warfarin Dosing Calendar   August 2017 Details    Sun Mon Tue Wed Thu Fri Sat       1               2               3               4               5                 6               7               8               9               10               11               12                 13               14               15               16                17   2.6   7.5 mg   See details      18      3.75 mg         19      3.75 mg           20      7.5 mg         21      3.75 mg         22      3.75 mg         23      3.75 mg         24      7.5 mg         25      3.75 mg         26      3.75 mg           27      7.5 mg         28      3.75 mg         29      3.75 mg         30      3.75 mg         31      7.5 mg            Date Details   08/17 This INR check   INR: 2.6               How to take your warfarin dose     To take:  3.75 mg Take 0.5 of a 7.5 mg tablet.    To take:  7.5 mg Take 1 of the 7.5 mg tablets.           Warfarin Dosing Calendar   September 2017 Details    Sun Mon Tue Wed Thu Fri Sat          1      3.75 mg         2      3.75 mg           3      7.5 mg         4      3.75 mg         5      3.75 mg         6      3.75 mg         7      7.5 mg         8      3.75 mg         9      3.75 mg           10      7.5 mg         11      3.75 mg         12      3.75 mg         13      3.75 mg         14      7.5 mg         15      3.75 mg         16      3.75 mg           17      7.5 mg         18      3.75 mg         19      3.75 mg         20      3.75 mg         21      7.5 mg         22      3.75 mg         23      3.75 mg           24      7.5 mg         25      3.75 mg         26      3.75 mg         27      3.75 mg         28      7.5 mg         29               30                Date Details   No additional details    Date of next INR:  9/28/2017         How to take your warfarin dose     To take:  3.75 mg Take 0.5 of a 7.5 mg tablet.    To take:  7.5 mg Take 1 of the 7.5 mg tablets.              Auto Secure Access Code: [email or web address]  Expires: 8/26/2017 10:15 AM    Your email address is not on file at [facility].  Email Addresses are required for you to sign up for Auto Secure, please contact [phone] to verify your personal information and to provide your email address prior to attempting to register for Auto Secure.    Johny Toledo  [address] [place] [address]    Mercari  A secure, online tool to manage your health information     [employer]’s Luminator Technology Groupt® is a secure, online tool that connects you to your personalized health information from the privacy of your home -- day or night - making it very easy for you to manage your healthcare. Once the activation process is completed, you can even access your medical information using the Mercari elva, which is available for free in the Apple Elva store or Google Play store.     To learn more about Mercari, visit [email or web address] Groupt    There are two levels of access available (as shown below):   My Chart Features  [facility] Primary Care Doctor [facility]  Specialists [facility]  Urgent  Care Non-[facility] Primary Care Doctor   Email your healthcare team securely and privately 24/7 X X X    Manage appointments: schedule your next appointment; view details of past/upcoming appointments X      Request prescription refills. X      View recent personal medical records, including lab and immunizations X X X X   View health record, including health history, allergies, medications X X X X   Read reports about your outpatient visits, procedures, consult and ER notes X X X X   See your discharge summary, which is a recap of your hospital and/or ER visit that includes your diagnosis, lab results, and care plan X X  X     How to register for Mercari:  Once your e-mail address has been verified, follow the following steps to sign up for Mercari.     1. Go to  [email or web address]  2. Click on the Sign Up Now box, which takes you to the New Member Sign Up page. You will need to provide the following information:  a. Enter your Mercari Access Code exactly as it appears at the top of this page. (You will not need to use this code after you’ve completed the sign-up process. If you do not sign up before the expiration date, you must request a new code.)   b. Enter your date of birth.   c. Enter your home email address.    d. Click Submit, and follow the next screen’s instructions.  3. Create a I-Mob Holdingst ID. This will be your I-Mob Holdingst login ID and cannot be changed, so think of one that is secure and easy to remember.  4. Create a I-Mob Holdingst password. You can change your password at any time.  5. Enter your Password Reset Question and Answer. This can be used at a later time if you forget your password.   6. Enter your e-mail address. This allows you to receive e-mail notifications when new information is available in [employer].  7. Click Sign Up. You can now view your health information.    For assistance activating your [employer] account, call [phone]

## 2017-08-17 NOTE — PROGRESS NOTES
Anticoagulation Summary as of 8/17/2017     INR goal 2.0-3.0   Selected INR 2.6 (8/17/2017)   Maintenance plan 7.5 mg (7.5 mg x 1) on Sun, Thu; 3.75 mg (7.5 mg x 0.5) all other days   Weekly total 33.75 mg   Plan last modified Jayden Rothman, VEROD (5/11/2017)   Next INR check 9/28/2017   Target end date Indefinite    Indications   Apical mural thrombus following MI [I51.3  I21.3]         Anticoagulation Episode Summary     INR check location     Preferred lab     Send INR reminders to     Comments       Anticoagulation Care Providers     Provider Role Specialty Phone number    EMI Jorgensen Referring Family Medicine [phone]    [facility] Anticoagulation Services Responsible  [phone]        Pt remains therapeutic today. Pt is to continue with current warfarin dosing regimen.  Pt denies any unusual s/s of bleeding, bruising, clotting or any changes to diet or medications.  Follow up in 6 weeks.  Peri George, PHARMD

## 2017-08-18 LAB — INR BLD: 2.6 (ref 0.9–1.2)

## 2017-09-07 DIAGNOSIS — G47.411 NARCOLEPSY WITH CATAPLEXY(347.01): ICD-10-CM

## 2017-09-07 RX ORDER — DEXTROAMPHETAMINE SACCHARATE, AMPHETAMINE ASPARTATE MONOHYDRATE, DEXTROAMPHETAMINE SULFATE AND AMPHETAMINE SULFATE 7.5; 7.5; 7.5; 7.5 MG/1; MG/1; MG/1; MG/1
CAPSULE, EXTENDED RELEASE ORAL
Qty: 180 CAP | Refills: 0 | Status: SHIPPED | OUTPATIENT
Start: 2017-09-07 | End: 2017-11-13 | Stop reason: SDUPTHER

## 2017-09-07 NOTE — TELEPHONE ENCOUNTER
Caller Name: Johny Jim Gannon                 Call Back Number: [phone] (home) [phone] (work)        Patient approves a detailed voicemail message: yes    Have we ever prescribed this med? Yes.  If yes, what date? 8/1/17    Last OV: 3/29/17- Dr. Sharp     Next OV: 9/29/17    DX: Narcolepsy w/ cataplexy     Medications: Adderall XR  Current Outpatient Prescriptions   Medication Sig Dispense Refill   • amphetamine-dextroamphetamine ER (ADDERALL XR) 30 MG XR capsule 2-3 tabs by mouth up to 2-3 times per day as needed for narcolepsy symptoms. 180 Cap 0   • amphetamine-dextroamphetamine ER (ADDERALL XR) 30 MG XR capsule 2-3 tabs by mouth up to 2-3 times per day as needed for narcolepsy symptoms. 180 Cap 0   • zolpidem (AMBIEN CR) 12.5 MG CR tablet Take 1 Tab by mouth at bedtime as needed for Sleep (insomnia). 30 Tab 3   • atorvastatin (LIPITOR) 40 MG Tab Take 40 mg by mouth every evening.     • carvedilol (COREG) 25 MG Tab Take 25 mg by mouth 2 times a day, with meals.     • warfarin (COUMADIN) 7.5 MG Tab Take 7.5 mg by mouth every day.     • amlodipine (NORVASC) 10 MG TABS TAKE 1 TABLET BY MOUTH EVERY DAY 30 Tab 6   • enalapril (VASOTEC) 20 MG tablet TAKE 1 TABLET BY MOUTH TWICE A DAY 60 Tab 3   • aspirin EC (ECOTRIN) 81 MG TBEC Take 1 Tab by mouth every day. 30 Each 11     No current facility-administered medications for this visit.

## 2017-09-28 ENCOUNTER — ANTICOAGULATION VISIT (OUTPATIENT)
Dept: VASCULAR LAB | Facility: MEDICAL CENTER | Age: 47
End: 2017-09-28
Attending: INTERNAL MEDICINE
Payer: COMMERCIAL

## 2017-09-28 VITALS — HEART RATE: 80 BPM | DIASTOLIC BLOOD PRESSURE: 95 MMHG | SYSTOLIC BLOOD PRESSURE: 135 MMHG

## 2017-09-28 DIAGNOSIS — I23.6 APICAL MURAL THROMBUS FOLLOWING MI (HCC): ICD-10-CM

## 2017-09-28 LAB
INR BLD: 2.2 (ref 0.9–1.2)
INR PPP: 2.2 (ref 2–3.5)

## 2017-09-28 PROCEDURE — 85610 PROTHROMBIN TIME: CPT

## 2017-09-28 PROCEDURE — 99211 OFF/OP EST MAY X REQ PHY/QHP: CPT

## 2017-09-28 NOTE — PROGRESS NOTES
Anticoagulation Summary  As of 9/28/2017    INR goal:   2.0-3.0   TTR:   79.8 % (4.3 mo)   Today's INR:   2.2   Maintenance plan:   7.5 mg (7.5 mg x 1) on Sun, Thu; 3.75 mg (7.5 mg x 0.5) all other days   Weekly total:   33.75 mg   Plan last modified:   Jayden Rothman, PharmD (5/11/2017)   Next INR check:   11/16/2017   Target end date:   Indefinite    Indications    Apical mural thrombus following MI [I51.3  I21.3]             Anticoagulation Episode Summary     INR check location:       Preferred lab:       Send INR reminders to:       Comments:         Anticoagulation Care Providers     Provider Role Specialty Phone number    EMI Jorgensen Referring Family Medicine [phone]    [facility] Anticoagulation Services Responsible  [phone]        Anticoagulation Patient Findings      HPI:  Johnyjoel Toledo seen in clinic today, on anticoagulation therapy with warfarin for Apical thrombus.   Changes to current medical/health status since last appt: None  Denies signs/symptoms of bleeding and/or thrombosis since the last appt.    Denies any interval changes to diet  Denies any interval changes to medications since last appt.   Denies any complications or cost restrictions with current therapy.   BP recorded in vitals.  Pt is to continue with current warfarin dosing regimen.     A/P   INR  therapeutic.   Pt is to continue with current warfarin dosing regimen.     Follow up appointment in 7 week(s).    Jayden Rothman, FarzanehD

## 2017-09-29 ENCOUNTER — SLEEP CENTER VISIT (OUTPATIENT)
Dept: SLEEP MEDICINE | Facility: MEDICAL CENTER | Age: 47
End: 2017-09-29
Payer: COMMERCIAL

## 2017-09-29 VITALS
RESPIRATION RATE: 16 BRPM | WEIGHT: 156 LBS | HEIGHT: 66 IN | HEART RATE: 85 BPM | DIASTOLIC BLOOD PRESSURE: 102 MMHG | OXYGEN SATURATION: 95 % | BODY MASS INDEX: 25.07 KG/M2 | SYSTOLIC BLOOD PRESSURE: 142 MMHG

## 2017-09-29 DIAGNOSIS — G47.411 NARCOLEPSY WITH CATAPLEXY(347.01): ICD-10-CM

## 2017-09-29 DIAGNOSIS — G47.411 NARCOLEPSY WITH CATAPLEXY: ICD-10-CM

## 2017-09-29 PROCEDURE — 99213 OFFICE O/P EST LOW 20 MIN: CPT | Performed by: INTERNAL MEDICINE

## 2017-09-29 RX ORDER — DEXTROAMPHETAMINE SACCHARATE, AMPHETAMINE ASPARTATE MONOHYDRATE, DEXTROAMPHETAMINE SULFATE AND AMPHETAMINE SULFATE 7.5; 7.5; 7.5; 7.5 MG/1; MG/1; MG/1; MG/1
CAPSULE, EXTENDED RELEASE ORAL
Qty: 180 CAP | Refills: 0 | Status: SHIPPED | OUTPATIENT
Start: 2017-09-29 | End: 2017-12-07 | Stop reason: SDUPTHER

## 2017-09-29 NOTE — LETTER
September 29, 2017          To him a concern,          Please excuse Mr. Johny Toledo from work today due to his medical appointment.                    Sincerely,          Sumi Sharp MD

## 2017-09-29 NOTE — LETTER
September 29, 2017          To whom it may concern,          Please excuse . Johnyjoel Toledo from work today due to his medical appointment.                      Sincerely,          Sumi Sharp

## 2017-11-13 DIAGNOSIS — G47.411 NARCOLEPSY WITH CATAPLEXY: ICD-10-CM

## 2017-11-13 RX ORDER — DEXTROAMPHETAMINE SACCHARATE, AMPHETAMINE ASPARTATE MONOHYDRATE, DEXTROAMPHETAMINE SULFATE AND AMPHETAMINE SULFATE 7.5; 7.5; 7.5; 7.5 MG/1; MG/1; MG/1; MG/1
CAPSULE, EXTENDED RELEASE ORAL
Qty: 180 CAP | Refills: 0 | Status: SHIPPED | OUTPATIENT
Start: 2017-11-13 | End: 2017-12-08 | Stop reason: SDUPTHER

## 2017-11-13 NOTE — TELEPHONE ENCOUNTER
Caller Name: Johny Jim Gannon                 Call Back Number: [phone] (home) [phone] (work)        Patient approves a detailed voicemail message: yes    Have we ever prescribed this med? Yes.  If yes, what date? 9/29/17    Last OV: 9/29/17- Dr. Sharp     Next OV: 12/15/17    DX: Narcolepsy w/ cataplexy    Medications: Adderall XR 30 MG

## 2017-11-16 ENCOUNTER — ANTICOAGULATION VISIT (OUTPATIENT)
Dept: VASCULAR LAB | Facility: MEDICAL CENTER | Age: 47
End: 2017-11-16
Attending: INTERNAL MEDICINE
Payer: COMMERCIAL

## 2017-11-16 DIAGNOSIS — I23.6 APICAL MURAL THROMBUS FOLLOWING MI (HCC): ICD-10-CM

## 2017-11-16 LAB
INR BLD: 1.4 (ref 0.9–1.2)
INR PPP: 1.4 (ref 2–3.5)

## 2017-11-16 PROCEDURE — 99212 OFFICE O/P EST SF 10 MIN: CPT

## 2017-11-16 PROCEDURE — 85610 PROTHROMBIN TIME: CPT

## 2017-11-16 NOTE — PROGRESS NOTES
Anticoagulation Summary  As of 11/16/2017    INR goal:   2.0-3.0   TTR:   64.8 % (6 mo)   Today's INR:   1.4!   Maintenance plan:   7.5 mg (7.5 mg x 1) on Sun, Thu; 3.75 mg (7.5 mg x 0.5) all other days   Weekly total:   33.75 mg   Plan last modified:   Farzaneh MensahD (5/11/2017)   Next INR check:   11/22/2017   Target end date:   Indefinite    Indications    Apical mural thrombus following MI (CMS-[facility]) [I51.3  I21.3]             Anticoagulation Episode Summary     INR check location:       Preferred lab:       Send INR reminders to:       Comments:         Anticoagulation Care Providers     Provider Role Specialty Phone number    EMI Jorgensen Referring Family Medicine [phone]    [facility] Responsible  [phone]        Anticoagulation Patient Findings  Patient Findings     Negatives:   Signs/symptoms of thrombosis, Signs/symptoms of bleeding, Laboratory test error suspected, Change in health, Change in alcohol use, Change in activity, Upcoming invasive procedure, Emergency department visit, Upcoming dental procedure, Missed doses, Extra doses, Change in medications, Change in diet/appetite, Hospital admission, Bruising, Other complaints        History of Present Illness: follow up appointment for chronic anticoagulation with the high risk medication, warfarin for Apical mural thrombus.    Pt is critically sub therapeutic today.  Will bolus dose with 15mg today, and 11.25 tomorrow.  He did miss a few doses this past week.  He works at [employer] and has been working a lot of overtime.   Follow up in 1 weeks, to reduce risk of adverse events related to this high risk medication,  Warfarin.    Peri George, PharmD

## 2017-11-22 ENCOUNTER — ANTICOAGULATION VISIT (OUTPATIENT)
Dept: VASCULAR LAB | Facility: MEDICAL CENTER | Age: 47
End: 2017-11-22
Attending: INTERNAL MEDICINE
Payer: COMMERCIAL

## 2017-11-22 DIAGNOSIS — I23.6 APICAL MURAL THROMBUS FOLLOWING MI (HCC): ICD-10-CM

## 2017-11-22 LAB — INR PPP: 2.7 (ref 2–3.5)

## 2017-11-22 PROCEDURE — 85610 PROTHROMBIN TIME: CPT

## 2017-11-22 PROCEDURE — 99211 OFF/OP EST MAY X REQ PHY/QHP: CPT | Performed by: PHARMACIST

## 2017-11-22 NOTE — PROGRESS NOTES
Anticoagulation Summary  As of 11/22/2017    INR goal:   2.0-3.0   TTR:   64.4 % (6.2 mo)   Today's INR:   2.7   Maintenance plan:   7.5 mg (7.5 mg x 1) on Sun, Thu; 3.75 mg (7.5 mg x 0.5) all other days   Weekly total:   33.75 mg   Plan last modified:   Jayden Rothman, PharmD (5/11/2017)   Next INR check:   12/8/2017   Target end date:   Indefinite    Indications    Apical mural thrombus following MI (CMS-[facility]) [I51.3  I21.3]             Anticoagulation Episode Summary     INR check location:       Preferred lab:       Send INR reminders to:       Comments:         Anticoagulation Care Providers     Provider Role Specialty Phone number    EMI Jorgensen Referring Family Medicine [phone]    [facility] Anticoagulation Services Responsible  [phone]        Anticoagulation Patient Findings  Patient Findings     Negatives:   Signs/symptoms of thrombosis, Signs/symptoms of bleeding, Laboratory test error suspected, Change in health, Change in alcohol use, Change in activity, Upcoming invasive procedure, Emergency department visit, Upcoming dental procedure, Missed doses, Extra doses, Change in medications, Change in diet/appetite, Hospital admission, Bruising, Other complaints        HPI:   Johny Toledo seen in clinic today, on anticoagulation therapy with warfarin for blood clot prevention due to history of apical mural thrombus  CHADS-VASc = n/a  (adjusted ischemic stroke risk:  n/a)    Previous INR  1.4 on 11-16-17    Does patient have any changes to current medical/health status since last appt (Y/N):  NO  Does patient have any signs/symptoms of bleeding and/or thrombosis since the last appt (Y/N):  NO  Does patient have any interval changes to diet or medications since last appt (Y/N):  NO  Are there any complications or cost restrictions with current therapy (Y/N):  NO      Vitals:  Patient declines BP/vitals check at today's visit     Asssessment:      INR therapeutic at 2.7, therefore decreasing  patients risk of blood clot and/or bleeding/bruising event.   Reason(s) for out of range INR today:  n/a      Plan:  Pt is to continue with current warfarin dosing regimen as this dose has maintained him in therapeutic range previously.     Follow up:  Because warfarin is a high risk medication and current CHEST guidelines recommend regular monitoring intervals (few days up to 12 weeks), will have patient return to clinic in 2 weeks to recheck INR.    Jad Milian, PharmD

## 2017-11-27 LAB — INR BLD: 2.7 (ref 0.9–1.2)

## 2017-12-07 DIAGNOSIS — G47.411 NARCOLEPSY WITH CATAPLEXY: ICD-10-CM

## 2017-12-07 NOTE — TELEPHONE ENCOUNTER
Have we ever prescribed this med? Yes.  If yes, what date? 11/13/2017    Last OV: 9/29/2017    Next OV: 12/15/2017    DX: Narcolepsy with Cataplexy    Medications: Adderall

## 2017-12-08 ENCOUNTER — APPOINTMENT (OUTPATIENT)
Dept: VASCULAR LAB | Facility: MEDICAL CENTER | Age: 47
End: 2017-12-08
Payer: COMMERCIAL

## 2017-12-08 DIAGNOSIS — G47.411 NARCOLEPSY WITH CATAPLEXY: ICD-10-CM

## 2017-12-08 RX ORDER — DEXTROAMPHETAMINE SACCHARATE, AMPHETAMINE ASPARTATE MONOHYDRATE, DEXTROAMPHETAMINE SULFATE AND AMPHETAMINE SULFATE 7.5; 7.5; 7.5; 7.5 MG/1; MG/1; MG/1; MG/1
CAPSULE, EXTENDED RELEASE ORAL
Qty: 180 CAP | Refills: 0 | Status: SHIPPED | OUTPATIENT
Start: 2017-12-08 | End: 2017-12-15 | Stop reason: SDUPTHER

## 2017-12-08 RX ORDER — DEXTROAMPHETAMINE SACCHARATE, AMPHETAMINE ASPARTATE MONOHYDRATE, DEXTROAMPHETAMINE SULFATE AND AMPHETAMINE SULFATE 7.5; 7.5; 7.5; 7.5 MG/1; MG/1; MG/1; MG/1
CAPSULE, EXTENDED RELEASE ORAL
Qty: 180 CAP | Refills: 0 | Status: SHIPPED | OUTPATIENT
Start: 2017-12-08 | End: 2017-12-08 | Stop reason: SDUPTHER

## 2017-12-08 NOTE — TELEPHONE ENCOUNTER
Thank you for printing that but the provider was still Marianela. I think its changed to you now. Please reprint for signature./ap

## 2017-12-15 ENCOUNTER — SLEEP CENTER VISIT (OUTPATIENT)
Dept: SLEEP MEDICINE | Facility: MEDICAL CENTER | Age: 47
End: 2017-12-15
Payer: COMMERCIAL

## 2017-12-15 VITALS
HEART RATE: 96 BPM | BODY MASS INDEX: 24.43 KG/M2 | RESPIRATION RATE: 16 BRPM | HEIGHT: 66 IN | SYSTOLIC BLOOD PRESSURE: 120 MMHG | WEIGHT: 152 LBS | DIASTOLIC BLOOD PRESSURE: 88 MMHG | OXYGEN SATURATION: 97 %

## 2017-12-15 DIAGNOSIS — G47.411 NARCOLEPSY WITH CATAPLEXY: ICD-10-CM

## 2017-12-15 PROCEDURE — 99213 OFFICE O/P EST LOW 20 MIN: CPT | Performed by: INTERNAL MEDICINE

## 2017-12-15 RX ORDER — RANITIDINE 300 MG/1
300 TABLET ORAL
Refills: 0 | COMMUNITY
Start: 2017-10-13 | End: 2020-02-11

## 2017-12-15 RX ORDER — AMLODIPINE BESYLATE 5 MG/1
5 TABLET ORAL
Refills: 0 | COMMUNITY
Start: 2017-12-10 | End: 2019-08-22

## 2017-12-15 RX ORDER — DEXTROAMPHETAMINE SACCHARATE, AMPHETAMINE ASPARTATE MONOHYDRATE, DEXTROAMPHETAMINE SULFATE AND AMPHETAMINE SULFATE 7.5; 7.5; 7.5; 7.5 MG/1; MG/1; MG/1; MG/1
CAPSULE, EXTENDED RELEASE ORAL
Qty: 180 CAP | Refills: 0 | Status: SHIPPED | OUTPATIENT
Start: 2018-02-15 | End: 2018-03-15

## 2017-12-15 RX ORDER — DEXTROAMPHETAMINE SACCHARATE, AMPHETAMINE ASPARTATE MONOHYDRATE, DEXTROAMPHETAMINE SULFATE AND AMPHETAMINE SULFATE 7.5; 7.5; 7.5; 7.5 MG/1; MG/1; MG/1; MG/1
CAPSULE, EXTENDED RELEASE ORAL
Qty: 180 CAP | Refills: 0 | Status: SHIPPED | OUTPATIENT
Start: 2018-01-15 | End: 2018-03-30 | Stop reason: SDUPTHER

## 2017-12-15 RX ORDER — LISINOPRIL 10 MG/1
20 TABLET ORAL
Refills: 1 | COMMUNITY
Start: 2017-09-18 | End: 2019-08-22

## 2017-12-15 RX ORDER — DEXTROAMPHETAMINE SACCHARATE, AMPHETAMINE ASPARTATE MONOHYDRATE, DEXTROAMPHETAMINE SULFATE AND AMPHETAMINE SULFATE 7.5; 7.5; 7.5; 7.5 MG/1; MG/1; MG/1; MG/1
CAPSULE, EXTENDED RELEASE ORAL
Qty: 180 CAP | Refills: 0 | Status: SHIPPED | OUTPATIENT
Start: 2017-12-15 | End: 2017-12-15 | Stop reason: SDUPTHER

## 2017-12-15 NOTE — PROGRESS NOTES
No chief complaint on file.      HPI: This patient is a 47 y.o. Male  who returns for routine follow-up of narcolepsy with cataplexy. He uses Adderall XR 30 mg 2-3 tablets BID to TID. Alon was queried and shows appropriate filling of prescriptions. He works for [employer] and during the holiday hours has extended work shifts 7 days a week. He tries to power nap when he can and uses coffee is an additive stimulant. He admits he is not obtaining enough sleep nightly on account of his work hours. He denies sleep attacks. As of New Years, he will resume normal work schedule. He denies cataplexy.   He has a history of cardiomyopathy post-pacemaker and defibrillator, follows with Dr. Maharaj, with significant improvement in his cardiac function noted. He has been removed from the cardiac transplant list. He denies chest pain or palpitations.    Past Medical History:   Diagnosis Date   • Abnormal electrocardiogram 7/27/2011   • Bronchitis    • Chronic systolic congestive heart failure (CMS-HCC) 7/27/2011   • Congestive heart failure (CMS-HCC)    • Dizziness 3/31/2011   • Fall    • Heart valve disease    • Hyperlipidemia    • Hypertension    • Male erectile disorder 8/9/2011   • Mixed hyperlipidemia 8/9/2011   • Narcolepsy with cataplexy    • Pulmonary embolism (CMS-[facility])    • STEMI (ST elevation myocardial infarction) pk  trop 150 POBA LAD and DIAG med rx. 12/18/2010       Social History     Social History   • Marital status:      Spouse name: N/A   • Number of children: N/A   • Years of education: N/A     Occupational History   • Not on file.     Social History Main Topics   • Smoking status: Former Smoker     Packs/day: 0.50     Years: 15.00     Types: Cigarettes     Quit date: 12/2/2010   • Smokeless tobacco: Never Used      Comment: 1/2 pack/day   • Alcohol use 0.0 oz/week      Comment: occasional   • Drug use: No   • Sexual activity: Not on file     Other Topics Concern   • Not on file     Social History  "Narrative   • No narrative on file       History reviewed. No pertinent family history.    Current Outpatient Prescriptions on File Prior to Visit   Medication Sig Dispense Refill   • warfarin (COUMADIN) 7.5 MG Tab TAKE 1 & 1/2 TABLETS BY MOUTH ONCE DAILY 45 Tab 6   • atorvastatin (LIPITOR) 40 MG Tab Take 40 mg by mouth every evening.     • carvedilol (COREG) 25 MG Tab Take 25 mg by mouth 2 times a day, with meals.     • zolpidem (AMBIEN CR) 12.5 MG CR tablet Take 1 Tab by mouth at bedtime as needed for Sleep (insomnia). 30 Tab 3   • amlodipine (NORVASC) 10 MG TABS TAKE 1 TABLET BY MOUTH EVERY DAY 30 Tab 6   • enalapril (VASOTEC) 20 MG tablet TAKE 1 TABLET BY MOUTH TWICE A DAY 60 Tab 3   • aspirin EC (ECOTRIN) 81 MG TBEC Take 1 Tab by mouth every day. 30 Each 11     No current facility-administered medications on file prior to visit.        Allergies: Patient has no known allergies.    ROS:   Constitutional: Denies fevers, chills, night sweats, fatigue or weight loss  Eyes: Denies vision loss, pain, drainage, double vision  Ears, Nose, Throat: Denies earache, difficulty hearing, tinnitus, nasal congestion, hoarseness  Cardiovascular: Denies chest pain, tightness, palpitations, orthopnea or edema  Respiratory: Denies shortness of breath, cough, wheezing, hemoptysis  Sleep: As in history of present illness  GI: Denies heartburn, dysphagia, nausea, abdominal pain, diarrhea or constipation  : Denies frequent urination, hematuria, discharge or painful urination  Musculoskeletal: Denies back pain, painful joints, sore muscles  Neurological: Denies weakness or headaches  Skin: No rashes    Blood pressure 120/88, pulse 96, resp. rate 16, height 1.676 m (5' 6\"), weight 68.9 kg (152 lb), SpO2 97 %.    Physical Exam:  Appearance: Well-nourished, well-developed, in no acute distress  HEENT: Normocephalic, atraumatic, white sclera, PERRLA, oropharynx clear  Neck: No adenopathy or masses  Respiratory: no intercostal retractions " or accessory muscle use  Lungs auscultation: Clear to auscultation bilaterally  Cardiovascular: Regular rate rhythm. No murmurs, rubs or gallops.  No LE edema  Abdomen: soft, nondistended  Gait: Normal  Digits: No clubbing, cyanosis  Motor: No focal deficits  Orientation: Oriented to time, person and place    Diagnosis:  1. Narcolepsy with cataplexy  amphetamine-dextroamphetamine ER (ADDERALL XR) 30 MG XR capsule    amphetamine-dextroamphetamine ER (ADDERALL XR) 30 MG XR capsule    DISCONTINUED: amphetamine-dextroamphetamine ER (ADDERALL XR) 30 MG XR capsule       Plan:  The patient has narcolepsy with cataplexy, clinically stable. Unfortunately he has had extended work hours during the holiday season and is not obtaining adequate sleep.   He is encouraged to try to increase his sleep time to 7 hours nightly, continue power naps through the day when able, and use caffeine as an added stimulant.   Continue Adderall at current doses with prescriptions provided for the following 3 months.  Return in about 3 months (around 3/15/2018).

## 2017-12-15 NOTE — LETTER
December 15, 2017      To Whom It May Concern,        Please excuse Mr. Johny Toledo today from work. He had a mandatory medical appointment at our clinic.                    Sincerely,          Sumi Sharp MD

## 2018-01-19 ENCOUNTER — ANTICOAGULATION VISIT (OUTPATIENT)
Dept: VASCULAR LAB | Facility: MEDICAL CENTER | Age: 48
End: 2018-01-19
Attending: INTERNAL MEDICINE
Payer: COMMERCIAL

## 2018-01-19 VITALS
DIASTOLIC BLOOD PRESSURE: 88 MMHG | BODY MASS INDEX: 24.41 KG/M2 | HEIGHT: 66 IN | SYSTOLIC BLOOD PRESSURE: 128 MMHG | HEART RATE: 75 BPM | WEIGHT: 151.9 LBS

## 2018-01-19 DIAGNOSIS — I23.6 APICAL MURAL THROMBUS FOLLOWING MI (HCC): ICD-10-CM

## 2018-01-19 LAB
INR BLD: 1.8 (ref 0.9–1.2)
INR PPP: 1.8 (ref 2–3.5)

## 2018-01-19 PROCEDURE — 85610 PROTHROMBIN TIME: CPT

## 2018-01-19 PROCEDURE — 99212 OFFICE O/P EST SF 10 MIN: CPT

## 2018-01-19 NOTE — PROGRESS NOTES
Anticoagulation Summary  As of 1/19/2018    INR goal:   2.0-3.0   TTR:   67.6 % (8.1 mo)   Today's INR:   1.8!   Maintenance plan:   7.5 mg (7.5 mg x 1) on Sun, Thu; 3.75 mg (7.5 mg x 0.5) all other days   Weekly total:   33.75 mg   Plan last modified:   Jayden Rothman, PharmD (5/11/2017)   Next INR check:   2/2/2018   Target end date:   Indefinite    Indications    Apical mural thrombus following MI (CMS-HCC) [I51.3  I21.3]             Anticoagulation Episode Summary     INR check location:       Preferred lab:       Send INR reminders to:       Comments:         Anticoagulation Care Providers     Provider Role Specialty Phone number    EMI Jorgensen Referring Family Medicine [phone]    [facility] Responsible  [phone]        Anticoagulation Patient Findings      HPI:  Johny Jim Toledo seen in clinic today for follow up on anticoagulation therapy in the presence of Apical mural thrombus. Denies any changes to current medical/health status since last appointment. Denies any medication or diet changes. No current symptoms of bleeding or thrombosis reported.    A/P:     Patient had re-scheduled last appointment. And also admits to occasional missed doses   INR is sub-therapeutic at 1.8. Patient to bolus tonight with  Continue current regimen. BP recorded in vitals.    Follow up appointment in 2 week(s).    Next Appointment: Friday , 02/02/18 at 0930AM     Enriqueta Aquino, Pharm.D

## 2018-02-02 ENCOUNTER — APPOINTMENT (OUTPATIENT)
Dept: VASCULAR LAB | Facility: MEDICAL CENTER | Age: 48
End: 2018-02-02
Attending: INTERNAL MEDICINE
Payer: COMMERCIAL

## 2018-02-09 ENCOUNTER — ANTICOAGULATION VISIT (OUTPATIENT)
Dept: VASCULAR LAB | Facility: MEDICAL CENTER | Age: 48
End: 2018-02-09
Attending: INTERNAL MEDICINE
Payer: COMMERCIAL

## 2018-02-09 DIAGNOSIS — I23.6 APICAL MURAL THROMBUS FOLLOWING MI (HCC): ICD-10-CM

## 2018-02-09 LAB
INR BLD: 1.4 (ref 0.9–1.2)
INR PPP: 1.4 (ref 2–3.5)

## 2018-02-09 PROCEDURE — 99212 OFFICE O/P EST SF 10 MIN: CPT

## 2018-02-09 PROCEDURE — 85610 PROTHROMBIN TIME: CPT

## 2018-02-09 NOTE — PROGRESS NOTES
Anticoagulation Summary  As of 2/9/2018    INR goal:   2.0-3.0   TTR:   62.2 % (8.8 mo)   Today's INR:   1.4!   Maintenance plan:   7.5 mg (7.5 mg x 1) on Sun, Thu; 3.75 mg (7.5 mg x 0.5) all other days   Weekly total:   33.75 mg   Plan last modified:   Jayden Rothman, PharmD (5/11/2017)   Next INR check:   2/16/2018   Target end date:   Indefinite    Indications    Apical mural thrombus following MI (CMS-HCC) [I51.3  I21.3]             Anticoagulation Episode Summary     INR check location:       Preferred lab:       Send INR reminders to:       Comments:         Anticoagulation Care Providers     Provider Role Specialty Phone number    EMI Jorgensen Referring Family Medicine [phone]    [facility] Responsible  [phone]        Anticoagulation Patient Findings      HPI:  Johnyjoel Toledo seen in clinic today for follow up on anticoagulation therapy in the presence of Apical mural thrombus following MI. Denies any changes to current medical/health status since last appointment. Denies any medication or diet changes. No current symptoms of bleeding or thrombosis reported.    A/P:   Patient missed dose last night and might have missed another earlier in the week he was not sure    INR is sub-therapeutic at 1.4, patient instructed to bolus 15 mg tonight, 7.5 mg tomorrow then resume regular regimen. Patient declined vitals at this time    Follow up appointment in 1 week(s).    Next Appointment: Friday , 02/16     Enriqueta Aquino, Pharm.D

## 2018-02-16 ENCOUNTER — ANTICOAGULATION VISIT (OUTPATIENT)
Dept: VASCULAR LAB | Facility: MEDICAL CENTER | Age: 48
End: 2018-02-16
Attending: INTERNAL MEDICINE
Payer: COMMERCIAL

## 2018-02-16 VITALS — SYSTOLIC BLOOD PRESSURE: 128 MMHG | HEART RATE: 89 BPM | DIASTOLIC BLOOD PRESSURE: 85 MMHG

## 2018-02-16 DIAGNOSIS — I23.6 APICAL MURAL THROMBUS FOLLOWING MI (HCC): ICD-10-CM

## 2018-02-16 LAB
INR BLD: 2.6 (ref 0.9–1.2)
INR PPP: 2.6 (ref 2–3.5)

## 2018-02-16 PROCEDURE — 85610 PROTHROMBIN TIME: CPT

## 2018-02-16 PROCEDURE — 99211 OFF/OP EST MAY X REQ PHY/QHP: CPT

## 2018-02-16 NOTE — PROGRESS NOTES
Anticoagulation Summary  As of 2/16/2018    INR goal:   2.0-3.0   TTR:   61.9 % (9 mo)   Today's INR:   2.6   Maintenance plan:   7.5 mg (7.5 mg x 1) on Sun, Thu; 3.75 mg (7.5 mg x 0.5) all other days   Weekly total:   33.75 mg   Plan last modified:   Jayden Rothman, PharmD (5/11/2017)   Next INR check:   3/2/2018   Target end date:   Indefinite    Indications    Apical mural thrombus following MI (CMS-HCC) [I51.3  I21.3]             Anticoagulation Episode Summary     INR check location:       Preferred lab:       Send INR reminders to:       Comments:         Anticoagulation Care Providers     Provider Role Specialty Phone number    EMI Jorgensen Referring Family Medicine [phone]    [facility] Anticoagulation Services Responsible  [phone]        Anticoagulation Patient Findings      HPI:  Johnyjoel Toledo seen in clinic today, on anticoagulation therapy with warfarin for apicul mural thrombus.   Changes to current medical/health status since last appt: none.  Denies signs/symptoms of bleeding and/or thrombosis since the last appt.    Denies any interval changes to diet  Denies any interval changes to medications since last appt.   Denies any complications or cost restrictions with current therapy.   BP recorded in vitals.  Confirmed dosing regimen.     A/P   INR  therapeutic.   Pt is to continue with current warfarin dosing regimen.     Follow up appointment in 2 week(s).    Jayden Rothman, PharmD

## 2018-03-02 ENCOUNTER — APPOINTMENT (OUTPATIENT)
Dept: VASCULAR LAB | Facility: MEDICAL CENTER | Age: 48
End: 2018-03-02
Payer: COMMERCIAL

## 2018-03-09 ENCOUNTER — ANTICOAGULATION VISIT (OUTPATIENT)
Dept: VASCULAR LAB | Facility: MEDICAL CENTER | Age: 48
End: 2018-03-09
Attending: INTERNAL MEDICINE
Payer: COMMERCIAL

## 2018-03-09 VITALS — HEART RATE: 90 BPM | DIASTOLIC BLOOD PRESSURE: 80 MMHG | SYSTOLIC BLOOD PRESSURE: 132 MMHG

## 2018-03-09 DIAGNOSIS — I23.6 APICAL MURAL THROMBUS FOLLOWING MI (HCC): ICD-10-CM

## 2018-03-09 LAB
INR BLD: 2.4 (ref 0.9–1.2)
INR PPP: 2.4 (ref 2–3.5)

## 2018-03-09 PROCEDURE — 99211 OFF/OP EST MAY X REQ PHY/QHP: CPT | Performed by: PHARMACIST

## 2018-03-09 PROCEDURE — 85610 PROTHROMBIN TIME: CPT

## 2018-03-09 NOTE — PROGRESS NOTES
Anticoagulation Summary  As of 3/9/2018    INR goal:   2.0-3.0   TTR:   64.6 % (9.7 mo)   Today's INR:   2.4   Maintenance plan:   7.5 mg (7.5 mg x 1) on Sun, Thu; 3.75 mg (7.5 mg x 0.5) all other days   Weekly total:   33.75 mg   Plan last modified:   Jayden Rothman, PharmD (5/11/2017)   Next INR check:   4/6/2018   Target end date:   Indefinite    Indications    Apical mural thrombus following MI (CMS-HCC) [I51.3  I21.3]             Anticoagulation Episode Summary     INR check location:       Preferred lab:       Send INR reminders to:       Comments:         Anticoagulation Care Providers     Provider Role Specialty Phone number    EMI Jorgensen Referring Family Medicine [phone]    [facility] Anticoagulation Services Responsible  [phone]        Anticoagulation Patient Findings    HPI:  Johnyjoel Toledo seen in clinic today, on anticoagulation therapy with warfarin for apicul mural thrombus.   Changes to current medical/health status since last appt: none.  Denies signs/symptoms of bleeding and/or thrombosis since the last appt.    Denies any interval changes to diet  Denies any interval changes to medications since last appt.   .   BP recorded in vitals.    Confirmed dosing regimen.     A:  Therapeutic INR    P:  Continue current dose of warfarin. Follow up INR in four weeks.    Stevie Lombardo, PharmD

## 2018-03-30 ENCOUNTER — SLEEP CENTER VISIT (OUTPATIENT)
Dept: SLEEP MEDICINE | Facility: MEDICAL CENTER | Age: 48
End: 2018-03-30
Payer: COMMERCIAL

## 2018-03-30 ENCOUNTER — APPOINTMENT (OUTPATIENT)
Dept: SLEEP MEDICINE | Facility: MEDICAL CENTER | Age: 48
End: 2018-03-30
Payer: COMMERCIAL

## 2018-03-30 VITALS
HEIGHT: 65 IN | HEART RATE: 98 BPM | BODY MASS INDEX: 25.49 KG/M2 | RESPIRATION RATE: 15 BRPM | WEIGHT: 153 LBS | OXYGEN SATURATION: 96 %

## 2018-03-30 DIAGNOSIS — G47.411 NARCOLEPSY WITH CATAPLEXY: ICD-10-CM

## 2018-03-30 DIAGNOSIS — I50.22 CHRONIC SYSTOLIC CONGESTIVE HEART FAILURE (HCC): Chronic | ICD-10-CM

## 2018-03-30 PROCEDURE — 99213 OFFICE O/P EST LOW 20 MIN: CPT | Performed by: NURSE PRACTITIONER

## 2018-03-30 RX ORDER — DEXTROAMPHETAMINE SACCHARATE, AMPHETAMINE ASPARTATE MONOHYDRATE, DEXTROAMPHETAMINE SULFATE AND AMPHETAMINE SULFATE 7.5; 7.5; 7.5; 7.5 MG/1; MG/1; MG/1; MG/1
CAPSULE, EXTENDED RELEASE ORAL
Qty: 180 CAP | Refills: 0 | Status: SHIPPED | OUTPATIENT
Start: 2018-05-06 | End: 2018-03-30 | Stop reason: SDUPTHER

## 2018-03-30 RX ORDER — DEXTROAMPHETAMINE SACCHARATE, AMPHETAMINE ASPARTATE MONOHYDRATE, DEXTROAMPHETAMINE SULFATE AND AMPHETAMINE SULFATE 7.5; 7.5; 7.5; 7.5 MG/1; MG/1; MG/1; MG/1
CAPSULE, EXTENDED RELEASE ORAL
Qty: 180 CAP | Refills: 0 | Status: SHIPPED | OUTPATIENT
Start: 2018-04-08 | End: 2018-03-30 | Stop reason: SDUPTHER

## 2018-03-30 RX ORDER — DEXTROAMPHETAMINE SACCHARATE, AMPHETAMINE ASPARTATE MONOHYDRATE, DEXTROAMPHETAMINE SULFATE AND AMPHETAMINE SULFATE 7.5; 7.5; 7.5; 7.5 MG/1; MG/1; MG/1; MG/1
CAPSULE, EXTENDED RELEASE ORAL
Qty: 180 CAP | Refills: 0 | Status: SHIPPED | OUTPATIENT
Start: 2018-05-08 | End: 2018-03-30 | Stop reason: SDUPTHER

## 2018-03-30 RX ORDER — DEXTROAMPHETAMINE SACCHARATE, AMPHETAMINE ASPARTATE MONOHYDRATE, DEXTROAMPHETAMINE SULFATE AND AMPHETAMINE SULFATE 7.5; 7.5; 7.5; 7.5 MG/1; MG/1; MG/1; MG/1
CAPSULE, EXTENDED RELEASE ORAL
Qty: 180 CAP | Refills: 0 | Status: SHIPPED | OUTPATIENT
Start: 2018-06-08 | End: 2018-07-05 | Stop reason: SDUPTHER

## 2018-03-30 NOTE — PROGRESS NOTES
Chief Complaint   Patient presents with   • Follow-Up     3 M         HPI: This patient is a 47 y.o. male, who presents for follow-up narcolepsy with cataplexy with med refill. He is seen regularly with Dr Sharp.     He uses Adderall XR 30 mg 2-3 tablets BID to TID. Alon was queried and shows appropriate filling of prescriptions. He works for [employer], very erratic hours. He can work 12-13 hour shifts routinely. He admits he is not obtaining enough sleep nightly on account of his work hours. He reports a few episodes of cataplexy over the last several months. He tells me he tries to avoid taking his medication weekends when he is able to stay home and rest. He tries to schedule naps if possible. 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 He denies other changes to his health.      He has a history of cardiomyopathy post-pacemaker and defibrillator, follows with Dr. Maharaj, with significant improvement in his cardiac function noted. He has been removed from the cardiac transplant list.     Past Medical History:   Diagnosis Date   • Abnormal electrocardiogram 7/27/2011   • Bronchitis    • Chronic systolic congestive heart failure (CMS-HCC) 7/27/2011   • Congestive heart failure (CMS-[facility])    • Dizziness 3/31/2011   • Fall    • Heart valve disease    • Hyperlipidemia    • Hypertension    • Male erectile disorder 8/9/2011   • Mixed hyperlipidemia 8/9/2011   • Narcolepsy with cataplexy    • Pulmonary embolism (CMS-[facility])    • STEMI (ST elevation myocardial infarction) pk  trop 150 POBA LAD and DIAG med rx. 12/18/2010       Social History   Substance Use Topics   • Smoking status: Former Smoker     Packs/day: 0.50     Years: 15.00     Types: Cigarettes     Quit date: 12/2/2010   • Smokeless tobacco: Never Used      Comment: 1/2 pack/day   • Alcohol use 0.0 oz/week      Comment: occasional       Family History   Problem Relation Age of Onset   • Sleep Apnea Neg Hx        Current medications as of today   Current  "Outpatient Prescriptions   Medication Sig Dispense Refill   • [START ON 6/8/2018] amphetamine-dextroamphetamine ER (ADDERALL XR) 30 MG XR capsule 2-3 tabs by mouth up to 2-3 times per day as needed for narcolepsy symptoms. 180 Cap 0   • lisinopril (PRINIVIL) 10 MG Tab 10 mg. 20 in morning 10 at night  1   • ranitidine (ZANTAC) 300 MG tablet Take 300 mg by mouth.  0   • amlodipine (NORVASC) 5 MG Tab Take 5 mg by mouth.  0   • warfarin (COUMADIN) 7.5 MG Tab TAKE 1 & 1/2 TABLETS BY MOUTH ONCE DAILY 45 Tab 6   • atorvastatin (LIPITOR) 40 MG Tab Take 40 mg by mouth every evening.     • carvedilol (COREG) 25 MG Tab Take 25 mg by mouth 2 times a day, with meals.     • amlodipine (NORVASC) 10 MG TABS TAKE 1 TABLET BY MOUTH EVERY DAY 30 Tab 6   • enalapril (VASOTEC) 20 MG tablet TAKE 1 TABLET BY MOUTH TWICE A DAY 60 Tab 3   • aspirin EC (ECOTRIN) 81 MG TBEC Take 1 Tab by mouth every day. 30 Each 11   • zolpidem (AMBIEN CR) 12.5 MG CR tablet Take 1 Tab by mouth at bedtime as needed for Sleep (insomnia). 30 Tab 3     No current facility-administered medications for this visit.        Allergies: Patient has no known allergies.    Pulse 98, resp. rate 15, height 1.651 m (5' 5\"), weight 69.4 kg (153 lb), SpO2 96 %.      ROS: As per HPI and otherwise negative if not stated.      Physical exam:   Constitutional: thin, in no acute distress  Eyes: PERRL  Neck: supple, trachea midline  Respiratory: no intercostal retractions or accessory muscle use   Musculoskeletal: no clubbing or cyanosis  Skin: No rashes or lesions noted on exposed skin  Neuro: No focal deficit noted  Psychiatric: Oriented to time, person and place.     Diagnosis:  1. Narcolepsy with cataplexy  amphetamine-dextroamphetamine ER (ADDERALL XR) 30 MG XR capsule    DISCONTINUED: amphetamine-dextroamphetamine ER (ADDERALL XR) 30 MG XR capsule    DISCONTINUED: amphetamine-dextroamphetamine ER (ADDERALL XR) 30 MG XR capsule    DISCONTINUED: amphetamine-dextroamphetamine ER " (ADDERALL XR) 30 MG XR capsule    DISCONTINUED: amphetamine-dextroamphetamine ER (ADDERALL XR) 30 MG XR capsule    DISCONTINUED: amphetamine-dextroamphetamine ER (ADDERALL XR) 30 MG XR capsule   2. Chronic systolic congestive heart failure (CMS-HCC)         Plan:    1. Continue Adderall XR 30 mg 2-3 tablets 2-3 times per day. Prescriptions for the next 3 months provided.  2. He is encouraged to increase sleep time if possible, schedule naps and take his medications routinely.  3. He will follow up with Dr Sharp in 3 months  4. He also requests a letter excusing him from work for his appointment

## 2018-07-05 DIAGNOSIS — G47.411 NARCOLEPSY WITH CATAPLEXY: ICD-10-CM

## 2018-07-05 NOTE — TELEPHONE ENCOUNTER
Have we ever prescribed this med? Yes.  If yes, what date? 18    Last OV: 18-Bick     Pt would like 3 months at a time. Please check and sign if appropriate.     Next OV: 08/10/18-Aron    DX: Narcolepsy with Cataplexy     Medications:   Requested Prescriptions     Pending Prescriptions Disp Refills   • amphetamine-dextroamphetamine ER (ADDERALL XR) 30 MG XR capsule 180 Cap 0     Si-3 tabs by mouth up to 2-3 times per day as needed for narcolepsy symptoms.   • amphetamine-dextroamphetamine ER (ADDERALL XR) 30 MG XR capsule 180 Cap 0     Si-3 tabs by mouth up to 2-3 times per day as needed for Narcolepsy symptoms.   • amphetamine-dextroamphetamine ER (ADDERALL XR) 30 MG XR capsule 180 Cap 0     Si-3 tabs by mouth up to 2-3 times per day as needed for narcolepsy symptoms.

## 2018-07-06 RX ORDER — DEXTROAMPHETAMINE SACCHARATE, AMPHETAMINE ASPARTATE MONOHYDRATE, DEXTROAMPHETAMINE SULFATE AND AMPHETAMINE SULFATE 7.5; 7.5; 7.5; 7.5 MG/1; MG/1; MG/1; MG/1
CAPSULE, EXTENDED RELEASE ORAL
Qty: 180 CAP | Refills: 0 | OUTPATIENT
Start: 2018-09-04 | End: 2018-10-03

## 2018-07-06 RX ORDER — DEXTROAMPHETAMINE SACCHARATE, AMPHETAMINE ASPARTATE MONOHYDRATE, DEXTROAMPHETAMINE SULFATE AND AMPHETAMINE SULFATE 7.5; 7.5; 7.5; 7.5 MG/1; MG/1; MG/1; MG/1
CAPSULE, EXTENDED RELEASE ORAL
Qty: 180 CAP | Refills: 0 | Status: SHIPPED | OUTPATIENT
Start: 2018-07-06 | End: 2018-08-04

## 2018-07-06 RX ORDER — DEXTROAMPHETAMINE SACCHARATE, AMPHETAMINE ASPARTATE MONOHYDRATE, DEXTROAMPHETAMINE SULFATE AND AMPHETAMINE SULFATE 7.5; 7.5; 7.5; 7.5 MG/1; MG/1; MG/1; MG/1
CAPSULE, EXTENDED RELEASE ORAL
Qty: 180 CAP | Refills: 0 | Status: SHIPPED | OUTPATIENT
Start: 2018-08-05 | End: 2018-08-10 | Stop reason: SDUPTHER

## 2018-08-10 ENCOUNTER — SLEEP CENTER VISIT (OUTPATIENT)
Dept: SLEEP MEDICINE | Facility: MEDICAL CENTER | Age: 48
End: 2018-08-10
Payer: COMMERCIAL

## 2018-08-10 VITALS
RESPIRATION RATE: 16 BRPM | OXYGEN SATURATION: 96 % | WEIGHT: 154 LBS | SYSTOLIC BLOOD PRESSURE: 144 MMHG | HEIGHT: 65 IN | HEART RATE: 108 BPM | BODY MASS INDEX: 25.66 KG/M2 | DIASTOLIC BLOOD PRESSURE: 100 MMHG

## 2018-08-10 DIAGNOSIS — R55 PRE-SYNCOPE: ICD-10-CM

## 2018-08-10 DIAGNOSIS — G47.411 NARCOLEPSY WITH CATAPLEXY: ICD-10-CM

## 2018-08-10 DIAGNOSIS — I50.22 CHRONIC SYSTOLIC CONGESTIVE HEART FAILURE (HCC): Chronic | ICD-10-CM

## 2018-08-10 PROCEDURE — 99213 OFFICE O/P EST LOW 20 MIN: CPT | Performed by: INTERNAL MEDICINE

## 2018-08-10 RX ORDER — DEXTROAMPHETAMINE SACCHARATE, AMPHETAMINE ASPARTATE MONOHYDRATE, DEXTROAMPHETAMINE SULFATE AND AMPHETAMINE SULFATE 7.5; 7.5; 7.5; 7.5 MG/1; MG/1; MG/1; MG/1
CAPSULE, EXTENDED RELEASE ORAL
Qty: 180 CAP | Refills: 0 | Status: SHIPPED | OUTPATIENT
Start: 2018-11-06 | End: 2018-11-09 | Stop reason: SDUPTHER

## 2018-08-10 RX ORDER — DEXTROAMPHETAMINE SACCHARATE, AMPHETAMINE ASPARTATE MONOHYDRATE, DEXTROAMPHETAMINE SULFATE AND AMPHETAMINE SULFATE 7.5; 7.5; 7.5; 7.5 MG/1; MG/1; MG/1; MG/1
CAPSULE, EXTENDED RELEASE ORAL
Qty: 180 CAP | Refills: 0 | Status: SHIPPED | OUTPATIENT
Start: 2018-09-05 | End: 2018-08-10 | Stop reason: SDUPTHER

## 2018-08-10 RX ORDER — DEXTROAMPHETAMINE SACCHARATE, AMPHETAMINE ASPARTATE MONOHYDRATE, DEXTROAMPHETAMINE SULFATE AND AMPHETAMINE SULFATE 7.5; 7.5; 7.5; 7.5 MG/1; MG/1; MG/1; MG/1
CAPSULE, EXTENDED RELEASE ORAL
Qty: 180 CAP | Refills: 0 | Status: SHIPPED | OUTPATIENT
Start: 2018-10-06 | End: 2018-08-10 | Stop reason: SDUPTHER

## 2018-08-10 NOTE — PROGRESS NOTES
Chief Complaint   Patient presents with   • Follow-Up     narcolepsy with cataplexy     HPI: This patient is a 47 y.o. Male  who returns for routine follow-up of narcolepsy with cataplexy. He uses Adderall XR 30 mg 2-3 tablets BID to TID. YanyMARSHALL was queried and shows appropriate filling of prescriptions. 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  He recently had a near syncopal episode while working outside in>102 degree heat.  He has been drinking gallons of water. He denies sleep attacks.  He had one episode of mild cataplexy when a coworker was playing around and startled him.  He takes drug holidays on days off.  He has a history of cardiomyopathy post-pacemaker and defibrillator, follows with Dr. Maharaj, with significant improvement in his cardiac function noted. He has been removed from the cardiac transplant list. He denies chest pain or palpitations.  He has a follow-up pending in 2 weeks with updated cardiac testing.      Past Medical History:   Diagnosis Date   • Abnormal electrocardiogram 7/27/2011   • Bronchitis    • Chronic systolic congestive heart failure (HCC) 7/27/2011   • Congestive heart failure (HCC)    • Dizziness 3/31/2011   • Fall    • Heart valve disease    • Hyperlipidemia    • Hypertension    • Male erectile disorder 8/9/2011   • Mixed hyperlipidemia 8/9/2011   • Narcolepsy with cataplexy    • Pulmonary embolism (HCC)    • STEMI (ST elevation myocardial infarction) pk  trop 150 POBA LAD and DIAG med rx. 12/18/2010       Social History     Social History   • Marital status:      Spouse name: N/A   • Number of children: N/A   • Years of education: N/A     Occupational History   • Not on file.     Social History Main Topics   • Smoking status: Former Smoker     Packs/day: 0.50     Years: 15.00     Types: Cigarettes     Quit date: 12/2/2010   • Smokeless tobacco: Never Used      Comment: 1/2 pack/day   • Alcohol use 0.0  oz/week      Comment: occasional   • Drug use: No   • Sexual activity: Not on file     Other Topics Concern   • Not on file     Social History Narrative   • No narrative on file       Family History   Problem Relation Age of Onset   • Sleep Apnea Neg Hx        Current Outpatient Prescriptions on File Prior to Visit   Medication Sig Dispense Refill   • lisinopril (PRINIVIL) 10 MG Tab 10 mg. 20 in morning 10 at night  1   • ranitidine (ZANTAC) 300 MG tablet Take 300 mg by mouth.  0   • amlodipine (NORVASC) 5 MG Tab Take 5 mg by mouth.  0   • warfarin (COUMADIN) 7.5 MG Tab TAKE 1 & 1/2 TABLETS BY MOUTH ONCE DAILY 45 Tab 6   • atorvastatin (LIPITOR) 40 MG Tab Take 40 mg by mouth every evening.     • carvedilol (COREG) 25 MG Tab Take 25 mg by mouth 2 times a day, with meals.     • amlodipine (NORVASC) 10 MG TABS TAKE 1 TABLET BY MOUTH EVERY DAY 30 Tab 6   • enalapril (VASOTEC) 20 MG tablet TAKE 1 TABLET BY MOUTH TWICE A DAY 60 Tab 3   • zolpidem (AMBIEN CR) 12.5 MG CR tablet Take 1 Tab by mouth at bedtime as needed for Sleep (insomnia). 30 Tab 3   • aspirin EC (ECOTRIN) 81 MG TBEC Take 1 Tab by mouth every day. 30 Each 11     No current facility-administered medications on file prior to visit.        Allergies: Patient has no known allergies.    ROS:   Constitutional: Denies fevers, chills, night sweats, fatigue or weight loss  Eyes: Denies vision loss, pain, drainage, double vision  Ears, Nose, Throat: Denies earache, difficulty hearing, tinnitus, nasal congestion, hoarseness  Cardiovascular: Denies chest pain, tightness, palpitations, orthopnea or edema  Respiratory: Denies shortness of breath, cough, wheezing, hemoptysis  Sleep: Denies daytime sleepiness, snoring, apneas, insomnia, morning headaches  GI: Denies heartburn, dysphagia, nausea, abdominal pain, diarrhea or constipation  : Denies frequent urination, hematuria, discharge or painful urination  Musculoskeletal: Denies back pain, painful joints, sore  "muscles  Neurological: Denies weakness or headaches  Skin: No rashes    Blood pressure 144/100, pulse (!) 108, resp. rate 16, height 1.651 m (5' 5\"), weight 69.9 kg (154 lb), SpO2 96 %.    Physical Exam:  Appearance: Well-nourished, well-developed, in no acute distress  HEENT: Normocephalic, atraumatic, white sclera, PERRLA, oropharynx clear  Neck: No adenopathy or masses  Respiratory: no intercostal retractions or accessory muscle use  Lungs auscultation: Clear to auscultation bilaterally  Cardiovascular: Regular rate rhythm. No murmurs, rubs or gallops.  No LE edema  Abdomen: soft, nondistended  Gait: Normal  Digits: No clubbing, cyanosis  Motor: No focal deficits  Orientation: Oriented to time, person and place    Diagnosis:  1. Narcolepsy with cataplexy  amphetamine-dextroamphetamine ER (ADDERALL XR) 30 MG XR capsule    DISCONTINUED: amphetamine-dextroamphetamine ER (ADDERALL XR) 30 MG XR capsule    DISCONTINUED: amphetamine-dextroamphetamine ER (ADDERALL XR) 30 MG XR capsule   2. Chronic systolic congestive heart failure (HCC)     3. Pre-syncope         Plan:  The patient's narcolepsy and cataplexy appear well-controlled overall.  JETTPolyThericsEVERARDO was queried and shows appropriate prescription refills. Continue Adderall XR 30 mg 2-3 tablets twice daily to 3 times daily.  He had a near syncopal episode, and was encouraged to drink fluids with electrolytes, not just water.  He is pending cardiac follow-up with Dr. Maharaj and we will request cardiac records.  Return in about 3 months (around 11/10/2018).      "

## 2018-08-14 DIAGNOSIS — I25.2 HISTORY OF ST ELEVATION MYOCARDIAL INFARCTION (STEMI): ICD-10-CM

## 2018-08-17 ENCOUNTER — ANTICOAGULATION VISIT (OUTPATIENT)
Dept: VASCULAR LAB | Facility: MEDICAL CENTER | Age: 48
End: 2018-08-17
Attending: INTERNAL MEDICINE
Payer: COMMERCIAL

## 2018-08-17 DIAGNOSIS — I23.6 APICAL MURAL THROMBUS FOLLOWING MI (HCC): ICD-10-CM

## 2018-08-17 LAB
INR BLD: 1 (ref 0.9–1.2)
INR PPP: 1 (ref 2–3.5)

## 2018-08-17 PROCEDURE — 85610 PROTHROMBIN TIME: CPT

## 2018-08-17 PROCEDURE — 99212 OFFICE O/P EST SF 10 MIN: CPT
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
## 2018-08-24 ENCOUNTER — APPOINTMENT (OUTPATIENT)
Dept: VASCULAR LAB | Facility: MEDICAL CENTER | Age: 48
End: 2018-08-24
Attending: INTERNAL MEDICINE
Payer: COMMERCIAL

## 2018-11-09 ENCOUNTER — SLEEP CENTER VISIT (OUTPATIENT)
Dept: SLEEP MEDICINE | Facility: MEDICAL CENTER | Age: 48
End: 2018-11-09
Payer: COMMERCIAL

## 2018-11-09 VITALS
DIASTOLIC BLOOD PRESSURE: 82 MMHG | BODY MASS INDEX: 25.99 KG/M2 | RESPIRATION RATE: 16 BRPM | SYSTOLIC BLOOD PRESSURE: 130 MMHG | OXYGEN SATURATION: 99 % | HEART RATE: 101 BPM | HEIGHT: 65 IN | WEIGHT: 156 LBS

## 2018-11-09 DIAGNOSIS — G47.411 NARCOLEPSY AND CATAPLEXY: ICD-10-CM

## 2018-11-09 PROCEDURE — 99213 OFFICE O/P EST LOW 20 MIN: CPT | Performed by: INTERNAL MEDICINE

## 2018-11-09 RX ORDER — DEXTROAMPHETAMINE SACCHARATE, AMPHETAMINE ASPARTATE MONOHYDRATE, DEXTROAMPHETAMINE SULFATE AND AMPHETAMINE SULFATE 7.5; 7.5; 7.5; 7.5 MG/1; MG/1; MG/1; MG/1
CAPSULE, EXTENDED RELEASE ORAL
Qty: 180 CAP | Refills: 0 | Status: SHIPPED | OUTPATIENT
Start: 2018-12-06 | End: 2018-11-09 | Stop reason: SDUPTHER

## 2018-11-09 RX ORDER — DEXTROAMPHETAMINE SACCHARATE, AMPHETAMINE ASPARTATE MONOHYDRATE, DEXTROAMPHETAMINE SULFATE AND AMPHETAMINE SULFATE 7.5; 7.5; 7.5; 7.5 MG/1; MG/1; MG/1; MG/1
CAPSULE, EXTENDED RELEASE ORAL
Qty: 180 CAP | Refills: 0 | Status: SHIPPED | OUTPATIENT
Start: 2019-01-06 | End: 2019-02-04 | Stop reason: SDUPTHER

## 2018-11-09 NOTE — PROGRESS NOTES
Chief Complaint   Patient presents with   • Follow-Up     narcolespy with cataplexy       HPI: This patient is a 48 y.o. Male  who returns for routine follow-up of narcolepsy with cataplexy. He uses Adderall XR 30 mg 2-3 tablets BID to TID. JETTAjEVREARDO was queried and shows appropriate filling of prescriptions. He has worked for [employer] and is heading into an extremely busy time of the year.  His work hours have already increased.  He estimates 6 hours of sleep nightly.  He denies sleep attacks although has to take measures such as listening to music and moving around to prevent him from nodding off at times.  He denies cataplexy.    He has a history of cardiomyopathy post-pacemaker and defibrillator, follows with Dr. Maharaj, with significant improvement in his cardiac function noted. He has been removed from the cardiac transplant list. He denies chest pain or palpitations, however has been told to slow down by his cardiologist.  He avoids caffeine on account of his cardiac issues.  Medical records are requested.    Past Medical History:   Diagnosis Date   • Abnormal electrocardiogram 7/27/2011   • Bronchitis    • Chronic systolic congestive heart failure (HCC) 7/27/2011   • Congestive heart failure (HCC)    • Dizziness 3/31/2011   • Fall    • Heart valve disease    • Hyperlipidemia    • Hypertension    • Male erectile disorder 8/9/2011   • Mixed hyperlipidemia 8/9/2011   • Narcolepsy with cataplexy    • Pulmonary embolism (HCC)    • STEMI (ST elevation myocardial infarction) pk  trop 150 POBA LAD and DIAG med rx. 12/18/2010       Social History     Social History   • Marital status:      Spouse name: N/A   • Number of children: N/A   • Years of education: N/A     Occupational History   • Not on file.     Social History Main Topics   • Smoking status: Former Smoker     Packs/day: 0.50     Years: 15.00     Types: Cigarettes     Quit date: 12/2/2010   • Smokeless tobacco: Never Used      Comment: 1/2 pack/day    • Alcohol use 0.0 oz/week      Comment: occasional   • Drug use: No   • Sexual activity: Not on file     Other Topics Concern   • Not on file     Social History Narrative   • No narrative on file       Family History   Problem Relation Age of Onset   • Sleep Apnea Neg Hx        Current Outpatient Prescriptions on File Prior to Visit   Medication Sig Dispense Refill   • lisinopril (PRINIVIL) 10 MG Tab 20 mg. 20 in morning 10 at night  1   • ranitidine (ZANTAC) 300 MG tablet Take 300 mg by mouth.  0   • amlodipine (NORVASC) 5 MG Tab Take 5 mg by mouth.  0   • warfarin (COUMADIN) 7.5 MG Tab TAKE 1 & 1/2 TABLETS BY MOUTH ONCE DAILY 45 Tab 6   • atorvastatin (LIPITOR) 40 MG Tab Take 40 mg by mouth every evening.     • carvedilol (COREG) 25 MG Tab Take 25 mg by mouth 2 times a day, with meals.     • amlodipine (NORVASC) 10 MG TABS TAKE 1 TABLET BY MOUTH EVERY DAY 30 Tab 6   • zolpidem (AMBIEN CR) 12.5 MG CR tablet Take 1 Tab by mouth at bedtime as needed for Sleep (insomnia). (Patient not taking: Reported on 11/9/2018) 30 Tab 3   • enalapril (VASOTEC) 20 MG tablet TAKE 1 TABLET BY MOUTH TWICE A DAY (Patient not taking: Reported on 11/9/2018) 60 Tab 3   • aspirin EC (ECOTRIN) 81 MG TBEC Take 1 Tab by mouth every day. (Patient not taking: Reported on 11/9/2018) 30 Each 11     No current facility-administered medications on file prior to visit.        Allergies: Patient has no known allergies.    ROS:   Constitutional: Denies fevers, chills, night sweats, fatigue or weight loss  Eyes: Denies vision loss, pain, drainage, double vision  Ears, Nose, Throat: Denies earache, difficulty hearing, tinnitus, nasal congestion, hoarseness  Cardiovascular: Denies chest pain, tightness, palpitations, orthopnea or edema  Respiratory: Denies shortness of breath, cough, wheezing, hemoptysis  Sleep: As in HPI  GI: Denies heartburn, dysphagia, nausea, abdominal pain, diarrhea or constipation  : Denies frequent urination, hematuria,  "discharge or painful urination  Musculoskeletal: Denies back pain, painful joints, sore muscles  Neurological: Denies weakness or headaches  Skin: No rashes    Blood pressure 130/82, pulse (!) 101, resp. rate 16, height 1.651 m (5' 5\"), weight 70.8 kg (156 lb), SpO2 99 %.    Physical Exam:  Appearance: Well-nourished, well-developed, in no acute distress  HEENT: Normocephalic, atraumatic, white sclera, PERRLA, oropharynx clear  Neck: No adenopathy or masses  Respiratory: no intercostal retractions or accessory muscle use  Lungs auscultation: Clear to auscultation bilaterally  Cardiovascular: Regular rate rhythm. No murmurs, rubs or gallops.  No LE edema  Abdomen: soft, nondistended  Gait: Normal  Digits: No clubbing, cyanosis  Motor: No focal deficits  Orientation: Oriented to time, person and place    Diagnosis:  1. Narcolepsy and cataplexy  amphetamine-dextroamphetamine ER (ADDERALL XR) 30 MG XR capsule    DISCONTINUED: amphetamine-dextroamphetamine ER (ADDERALL XR) 30 MG XR capsule       Plan:  The patient's narcolepsy and cataplexy show overall good control on Adderall XR.  He takes drug holidays on weekends and tries to make up for decreased sleep hours from his busy work week.  Prescription refills provided for Adderall XR 30 mg 2-3 tablets BID to TID.  Encourage follow-up with Dr. Maharaj for his cardiovascular disease.  Medical records are requested.  Return in about 3 months (around 2/9/2019).      "

## 2018-12-22 ENCOUNTER — HOSPITAL ENCOUNTER (EMERGENCY)
Facility: MEDICAL CENTER | Age: 48
End: 2018-12-23
Attending: EMERGENCY MEDICINE
Payer: COMMERCIAL

## 2018-12-22 DIAGNOSIS — R00.0 TACHYCARDIA: ICD-10-CM

## 2018-12-22 DIAGNOSIS — S41.112A ARM LACERATION, LEFT, INITIAL ENCOUNTER: ICD-10-CM
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
## 2018-12-22 PROCEDURE — 80307 DRUG TEST PRSMV CHEM ANLYZR: CPT

## 2018-12-22 PROCEDURE — 85610 PROTHROMBIN TIME: CPT

## 2018-12-22 PROCEDURE — 307744 HCHG RED TRAUMA TEAM SERVICES

## 2018-12-22 PROCEDURE — 85730 THROMBOPLASTIN TIME PARTIAL: CPT

## 2018-12-22 PROCEDURE — 85027 COMPLETE CBC AUTOMATED: CPT

## 2018-12-22 PROCEDURE — 80053 COMPREHEN METABOLIC PANEL: CPT

## 2018-12-22 PROCEDURE — 99284 EMERGENCY DEPT VISIT MOD MDM: CPT

## 2018-12-23 ENCOUNTER — HOSPITAL ENCOUNTER (OUTPATIENT)
Dept: RADIOLOGY | Facility: MEDICAL CENTER | Age: 48
End: 2018-12-23
Attending: EMERGENCY MEDICINE

## 2018-12-23 VITALS
HEIGHT: 66 IN | TEMPERATURE: 98.3 F | DIASTOLIC BLOOD PRESSURE: 113 MMHG | OXYGEN SATURATION: 95 % | BODY MASS INDEX: 24.91 KG/M2 | RESPIRATION RATE: 15 BRPM | WEIGHT: 155 LBS | SYSTOLIC BLOOD PRESSURE: 162 MMHG | HEART RATE: 127 BPM

## 2018-12-23 LAB
ABO GROUP BLD: NORMAL
ALBUMIN SERPL BCP-MCNC: 4.7 G/DL (ref 3.2–4.9)
ALBUMIN/GLOB SERPL: 1.2 G/DL
ALP SERPL-CCNC: 75 U/L (ref 30–99)
ALT SERPL-CCNC: 46 U/L (ref 2–50)
ANION GAP SERPL CALC-SCNC: 11 MMOL/L (ref 0–11.9)
APTT PPP: 28.7 SEC (ref 24.7–36)
AST SERPL-CCNC: 38 U/L (ref 12–45)
BILIRUB SERPL-MCNC: 0.6 MG/DL (ref 0.1–1.5)
BLD GP AB SCN SERPL QL: NORMAL
BUN SERPL-MCNC: 23 MG/DL (ref 8–22)
CALCIUM SERPL-MCNC: 10.1 MG/DL (ref 8.5–10.5)
CHLORIDE SERPL-SCNC: 103 MMOL/L (ref 96–112)
CO2 SERPL-SCNC: 26 MMOL/L (ref 20–33)
CREAT SERPL-MCNC: 1.64 MG/DL (ref 0.5–1.4)
ERYTHROCYTE [DISTWIDTH] IN BLOOD BY AUTOMATED COUNT: 41.9 FL (ref 35.9–50)
ETHANOL BLD-MCNC: 0 G/DL
GLOBULIN SER CALC-MCNC: 3.9 G/DL (ref 1.9–3.5)
GLUCOSE SERPL-MCNC: 122 MG/DL (ref 65–99)
HCT VFR BLD AUTO: 50.4 % (ref 42–52)
HGB BLD-MCNC: 17 G/DL (ref 14–18)
INR PPP: 0.92 (ref 0.87–1.13)
MCH RBC QN AUTO: 30.5 PG (ref 27–33)
MCHC RBC AUTO-ENTMCNC: 33.7 G/DL (ref 33.7–35.3)
MCV RBC AUTO: 90.5 FL (ref 81.4–97.8)
PLATELET # BLD AUTO: 344 K/UL (ref 164–446)
PMV BLD AUTO: 9.2 FL (ref 9–12.9)
POTASSIUM SERPL-SCNC: 3.2 MMOL/L (ref 3.6–5.5)
PROT SERPL-MCNC: 8.6 G/DL (ref 6–8.2)
PROTHROMBIN TIME: 12.5 SEC (ref 12–14.6)
RBC # BLD AUTO: 5.57 M/UL (ref 4.7–6.1)
RH BLD: NORMAL
SODIUM SERPL-SCNC: 140 MMOL/L (ref 135–145)
WBC # BLD AUTO: 10.6 K/UL (ref 4.8–10.8)

## 2018-12-23 PROCEDURE — 304999 HCHG REPAIR-SIMPLE/INTERMED LEVEL 1

## 2018-12-23 PROCEDURE — 86901 BLOOD TYPING SEROLOGIC RH(D): CPT

## 2018-12-23 PROCEDURE — 73060 X-RAY EXAM OF HUMERUS: CPT | Mod: LT

## 2018-12-23 PROCEDURE — 86900 BLOOD TYPING SEROLOGIC ABO: CPT

## 2018-12-23 PROCEDURE — 304217 HCHG IRRIGATION SYSTEM

## 2018-12-23 PROCEDURE — 303747 HCHG EXTRA SUTURE

## 2018-12-23 PROCEDURE — 700101 HCHG RX REV CODE 250: Performed by: EMERGENCY MEDICINE

## 2018-12-23 PROCEDURE — 86850 RBC ANTIBODY SCREEN: CPT

## 2018-12-23 RX ORDER — LIDOCAINE HYDROCHLORIDE AND EPINEPHRINE 10; 10 MG/ML; UG/ML
3 INJECTION, SOLUTION INFILTRATION; PERINEURAL ONCE
Status: COMPLETED | OUTPATIENT
Start: 2018-12-23 | End: 2018-12-23

## 2018-12-23 RX ADMIN — LIDOCAINE HYDROCHLORIDE AND EPINEPHRINE 3 ML: 10; 10 INJECTION, SOLUTION INFILTRATION; PERINEURAL at 00:09
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
## 2018-12-23 NOTE — DISCHARGE INSTRUCTIONS
Today we discussed holding antibiotics but please return if your wound appears infected, is more red or painful or you develop any weakness or numbness.  Today your heart rate was fast and blood pressure was high.  You elected for us not to test this further but if you change your mind please return to the emergency department.

## 2019-02-04 ENCOUNTER — TELEPHONE (OUTPATIENT)
Dept: PULMONOLOGY | Facility: HOSPICE | Age: 49
End: 2019-02-04

## 2019-02-04 DIAGNOSIS — G47.411 NARCOLEPSY AND CATAPLEXY: ICD-10-CM

## 2019-02-05 RX ORDER — DEXTROAMPHETAMINE SACCHARATE, AMPHETAMINE ASPARTATE MONOHYDRATE, DEXTROAMPHETAMINE SULFATE AND AMPHETAMINE SULFATE 7.5; 7.5; 7.5; 7.5 MG/1; MG/1; MG/1; MG/1
CAPSULE, EXTENDED RELEASE ORAL
Qty: 180 CAP | Refills: 0 | Status: SHIPPED | OUTPATIENT
Start: 2019-02-05 | End: 2019-02-08 | Stop reason: SDUPTHER

## 2019-02-05 NOTE — TELEPHONE ENCOUNTER
Dr Sharp states she will sign for a 30 day supply NOT FOR 90 DAYS. Order re-addressed for 30 day supply and sent to Dr Sharp for review and signature.

## 2019-02-08 ENCOUNTER — SLEEP CENTER VISIT (OUTPATIENT)
Dept: SLEEP MEDICINE | Facility: MEDICAL CENTER | Age: 49
End: 2019-02-08
Payer: COMMERCIAL

## 2019-02-08 VITALS
OXYGEN SATURATION: 93 % | HEIGHT: 66 IN | SYSTOLIC BLOOD PRESSURE: 102 MMHG | DIASTOLIC BLOOD PRESSURE: 72 MMHG | HEART RATE: 106 BPM | RESPIRATION RATE: 16 BRPM | WEIGHT: 161 LBS | BODY MASS INDEX: 25.88 KG/M2

## 2019-02-08 DIAGNOSIS — G47.411 NARCOLEPSY AND CATAPLEXY: ICD-10-CM

## 2019-02-08 DIAGNOSIS — I42.8 CARDIOMYOPATHY, NONISCHEMIC (HCC): ICD-10-CM

## 2019-02-08 PROCEDURE — 99213 OFFICE O/P EST LOW 20 MIN: CPT | Performed by: INTERNAL MEDICINE

## 2019-02-08 RX ORDER — DEXTROAMPHETAMINE SACCHARATE, AMPHETAMINE ASPARTATE MONOHYDRATE, DEXTROAMPHETAMINE SULFATE AND AMPHETAMINE SULFATE 7.5; 7.5; 7.5; 7.5 MG/1; MG/1; MG/1; MG/1
CAPSULE, EXTENDED RELEASE ORAL
Qty: 180 CAP | Refills: 0 | Status: SHIPPED | OUTPATIENT
Start: 2019-04-06 | End: 2019-02-08 | Stop reason: SDUPTHER

## 2019-02-08 RX ORDER — DEXTROAMPHETAMINE SACCHARATE, AMPHETAMINE ASPARTATE MONOHYDRATE, DEXTROAMPHETAMINE SULFATE AND AMPHETAMINE SULFATE 7.5; 7.5; 7.5; 7.5 MG/1; MG/1; MG/1; MG/1
CAPSULE, EXTENDED RELEASE ORAL
Qty: 180 CAP | Refills: 0 | Status: SHIPPED | OUTPATIENT
Start: 2019-05-06 | End: 2019-05-22 | Stop reason: SDUPTHER

## 2019-02-08 RX ORDER — DEXTROAMPHETAMINE SACCHARATE, AMPHETAMINE ASPARTATE MONOHYDRATE, DEXTROAMPHETAMINE SULFATE AND AMPHETAMINE SULFATE 7.5; 7.5; 7.5; 7.5 MG/1; MG/1; MG/1; MG/1
CAPSULE, EXTENDED RELEASE ORAL
Qty: 180 CAP | Refills: 0 | Status: SHIPPED | OUTPATIENT
Start: 2019-03-06 | End: 2019-02-08 | Stop reason: SDUPTHER

## 2019-02-08 RX ORDER — SODIUM FLUORIDE 1.1 G/100G
CREAM ORAL
Refills: 1 | COMMUNITY
Start: 2018-11-21 | End: 2019-02-07

## 2019-02-08 RX ORDER — AMOXICILLIN 500 MG/1
CAPSULE ORAL
Refills: 0 | COMMUNITY
Start: 2018-11-21 | End: 2019-02-07

## 2019-02-08 RX ORDER — AMOXICILLIN AND CLAVULANATE POTASSIUM 875; 125 MG/1; MG/1
1 TABLET, FILM COATED ORAL
Refills: 0 | COMMUNITY
Start: 2019-01-02 | End: 2019-02-07

## 2019-02-08 NOTE — PROGRESS NOTES
Chief Complaint   Patient presents with   • Follow-Up     narcolepsy with cataplexy     HPI: This patient is a 48 y.o. Male  who returns for routine follow-up of narcolepsy with cataplexy. He uses Adderall XR 30 mg 2-3 tablets BID to TID. Alon was queried and shows appropriate filling of prescriptions. He works for [employer] and typically puts in long work hours.  Denies sleep attacks or recent cataplexy.  He has been off work in the past month however as he had discontinued all of his cardiac medications and went into congestive heart failure.   Hospitalized at [facility]-medical records are requested. He has a history of cardiomyopathy post-pacemaker and defibrillator, follows with Dr. Maharaj.  He has since resumed medications and is returning to work next week.    Past Medical History:   Diagnosis Date   • Abnormal electrocardiogram 7/27/2011   • Bronchitis    • Chronic systolic congestive heart failure (HCC) 7/27/2011   • Congestive heart failure (HCC)    • Dizziness 3/31/2011   • Fall    • Heart valve disease    • Hyperlipidemia    • Hypertension    • Male erectile disorder 8/9/2011   • Mixed hyperlipidemia 8/9/2011   • Narcolepsy with cataplexy    • Pulmonary embolism (HCC)    • STEMI (ST elevation myocardial infarction) pk  trop 150 POBA LAD and DIAG med rx. 12/18/2010       Social History     Social History   • Marital status:      Spouse name: N/A   • Number of children: N/A   • Years of education: N/A     Occupational History   • Not on file.     Social History Main Topics   • Smoking status: Former Smoker     Packs/day: 0.50     Years: 15.00     Types: Cigarettes     Quit date: 12/2/2010   • Smokeless tobacco: Never Used      Comment: 1/2 pack/day   • Alcohol use 0.0 oz/week      Comment: occasional   • Drug use: No   • Sexual activity: Not on file     Other Topics Concern   • Not on file     Social History Narrative   • No narrative on file       Family History   Problem Relation Age of Onset   •  Sleep Apnea Neg Hx        Current Outpatient Prescriptions on File Prior to Visit   Medication Sig Dispense Refill   • lisinopril (PRINIVIL) 10 MG Tab 20 mg. 20 in morning 10 at night  1   • ranitidine (ZANTAC) 300 MG tablet Take 300 mg by mouth.  0   • amlodipine (NORVASC) 5 MG Tab Take 5 mg by mouth.  0   • warfarin (COUMADIN) 7.5 MG Tab TAKE 1 & 1/2 TABLETS BY MOUTH ONCE DAILY 45 Tab 6   • atorvastatin (LIPITOR) 40 MG Tab Take 40 mg by mouth every evening.     • carvedilol (COREG) 25 MG Tab Take 25 mg by mouth 2 times a day, with meals.     • amlodipine (NORVASC) 10 MG TABS TAKE 1 TABLET BY MOUTH EVERY DAY 30 Tab 6   • zolpidem (AMBIEN CR) 12.5 MG CR tablet Take 1 Tab by mouth at bedtime as needed for Sleep (insomnia). (Patient not taking: Reported on 11/9/2018) 30 Tab 3   • enalapril (VASOTEC) 20 MG tablet TAKE 1 TABLET BY MOUTH TWICE A DAY (Patient not taking: Reported on 2/8/2019) 60 Tab 3   • aspirin EC (ECOTRIN) 81 MG TBEC Take 1 Tab by mouth every day. (Patient not taking: Reported on 11/9/2018) 30 Each 11     No current facility-administered medications on file prior to visit.        Allergies: Patient has no known allergies.    ROS:   Constitutional: Denies fevers, chills, night sweats, fatigue or weight loss  Eyes: Denies vision loss, pain, drainage, double vision  Ears, Nose, Throat: Denies earache, difficulty hearing, tinnitus, nasal congestion, hoarseness  Cardiovascular: Denies chest pain, tightness, palpitations, orthopnea or edema  Respiratory: Denies shortness of breath, cough, wheezing, hemoptysis  Sleep: Denies daytime sleepiness, snoring, apneas, insomnia, morning headaches  GI: Denies heartburn, dysphagia, nausea, abdominal pain, diarrhea or constipation  : Denies frequent urination, hematuria, discharge or painful urination  Musculoskeletal: Denies back pain, painful joints, sore muscles  Neurological: Denies weakness or headaches  Skin: No rashes    Blood pressure 102/72, pulse (!) 106,  "resp. rate 16, height 1.676 m (5' 6\"), weight 73 kg (161 lb), SpO2 93 %.    Physical Exam:  Appearance: Well-nourished, well-developed, in no acute distress  HEENT: Normocephalic, atraumatic, white sclera, PERRLA, oropharynx clear  Neck: No adenopathy or masses  Respiratory: no intercostal retractions or accessory muscle use  Lungs auscultation: Clear to auscultation bilaterally  Cardiovascular: Regular rate rhythm. No murmurs, rubs or gallops.  No LE edema  Abdomen: soft, nondistended  Gait: Normal  Digits: No clubbing, cyanosis  Motor: No focal deficits  Orientation: Oriented to time, person and place    Diagnosis:  1. Narcolepsy and cataplexy  amphetamine-dextroamphetamine ER (ADDERALL XR) 30 MG XR capsule    DISCONTINUED: amphetamine-dextroamphetamine ER (ADDERALL XR) 30 MG XR capsule    DISCONTINUED: amphetamine-dextroamphetamine ER (ADDERALL XR) 30 MG XR capsule   2. Cardiomyopathy, nonischemic EF 15% no ascvd, query spasm vs. intracornary thrombus.         Plan:  The patient's narcolepsy is well controlled on Adderall XR.  Prescription provided monthly for the next 3 months.  We discussed the importance of not discontinuing his cardiac medications given his history of cardiomyopathy and congestive heart failure.  We will request medical records from [facility].  He is following with cardiology at [place].  Return in about 3 months (around 5/8/2019).      "

## 2019-05-22 ENCOUNTER — SLEEP CENTER VISIT (OUTPATIENT)
Dept: SLEEP MEDICINE | Facility: MEDICAL CENTER | Age: 49
End: 2019-05-22
Payer: COMMERCIAL

## 2019-05-22 VITALS
HEART RATE: 73 BPM | WEIGHT: 170.9 LBS | OXYGEN SATURATION: 95 % | BODY MASS INDEX: 27.47 KG/M2 | RESPIRATION RATE: 16 BRPM | HEIGHT: 66 IN | DIASTOLIC BLOOD PRESSURE: 84 MMHG | SYSTOLIC BLOOD PRESSURE: 122 MMHG

## 2019-05-22 DIAGNOSIS — G47.411 NARCOLEPSY WITH CATAPLEXY: ICD-10-CM

## 2019-05-22 DIAGNOSIS — I42.8 CARDIOMYOPATHY, NONISCHEMIC (HCC): ICD-10-CM

## 2019-05-22 PROCEDURE — 99213 OFFICE O/P EST LOW 20 MIN: CPT | Performed by: INTERNAL MEDICINE

## 2019-05-22 RX ORDER — DEXTROAMPHETAMINE SACCHARATE, AMPHETAMINE ASPARTATE MONOHYDRATE, DEXTROAMPHETAMINE SULFATE AND AMPHETAMINE SULFATE 7.5; 7.5; 7.5; 7.5 MG/1; MG/1; MG/1; MG/1
CAPSULE, EXTENDED RELEASE ORAL
Qty: 180 CAP | Refills: 0 | Status: SHIPPED | OUTPATIENT
Start: 2019-08-07 | End: 2019-08-23 | Stop reason: SDUPTHER

## 2019-05-22 RX ORDER — DEXTROAMPHETAMINE SACCHARATE, AMPHETAMINE ASPARTATE MONOHYDRATE, DEXTROAMPHETAMINE SULFATE AND AMPHETAMINE SULFATE 7.5; 7.5; 7.5; 7.5 MG/1; MG/1; MG/1; MG/1
CAPSULE, EXTENDED RELEASE ORAL
Qty: 180 CAP | Refills: 0 | Status: SHIPPED | OUTPATIENT
Start: 2019-07-07 | End: 2019-05-22 | Stop reason: SDUPTHER

## 2019-05-22 RX ORDER — DEXTROAMPHETAMINE SACCHARATE, AMPHETAMINE ASPARTATE MONOHYDRATE, DEXTROAMPHETAMINE SULFATE AND AMPHETAMINE SULFATE 7.5; 7.5; 7.5; 7.5 MG/1; MG/1; MG/1; MG/1
CAPSULE, EXTENDED RELEASE ORAL
Qty: 180 CAP | Refills: 0 | Status: SHIPPED | OUTPATIENT
Start: 2019-06-07 | End: 2019-05-22 | Stop reason: SDUPTHER

## 2019-05-22 NOTE — PROGRESS NOTES
Chief Complaint   Patient presents with   • Follow-Up     Narcolepsy       HPI: This patient is a 48 y.o. Male  who returns for routine follow-up of narcolepsy with cataplexy. He uses Adderall XR 30 mg 2-3 tablets BID to TID. Alon was queried and shows appropriate filling of prescriptions. He works for [employer] and typically puts in long work hours although things have slowed down past [place] season.  He is now obtaining 8 hours of sleep regularly. Denies sleep attacks or recent cataplexy. He has a history of cardiomyopathy post-pacemaker and defibrillator, follows with Dr. Maharaj.  He is scheduled to see [place] [facility] next week.  He admits he has not been taking all of his cardiac medications and is concerned regarding weight gain.  Denies shortness of breath or orthopnea.    Past Medical History:   Diagnosis Date   • Abnormal electrocardiogram 7/27/2011   • Bronchitis    • Chronic systolic congestive heart failure (HCC) 7/27/2011   • Congestive heart failure (HCC)    • Dizziness 3/31/2011   • Fall    • Heart valve disease    • Hyperlipidemia    • Hypertension    • Male erectile disorder 8/9/2011   • Mixed hyperlipidemia 8/9/2011   • Narcolepsy with cataplexy    • Pulmonary embolism (HCC)    • STEMI (ST elevation myocardial infarction) pk  trop 150 POBA LAD and DIAG med rx. 12/18/2010       Social History     Social History   • Marital status:      Spouse name: N/A   • Number of children: N/A   • Years of education: N/A     Occupational History   • Not on file.     Social History Main Topics   • Smoking status: Former Smoker     Packs/day: 0.50     Years: 15.00     Types: Cigarettes     Quit date: 12/2/2010   • Smokeless tobacco: Never Used      Comment: 1/2 pack/day   • Alcohol use 0.0 oz/week      Comment: occasional   • Drug use: No   • Sexual activity: Not on file     Other Topics Concern   • Not on file     Social History Narrative   • No narrative on file       Family  History   Problem Relation Age of Onset   • Sleep Apnea Neg Hx        Current Outpatient Prescriptions on File Prior to Visit   Medication Sig Dispense Refill   • lisinopril (PRINIVIL) 10 MG Tab 20 mg. 20 in morning 10 at night  1   • ranitidine (ZANTAC) 300 MG tablet Take 300 mg by mouth.  0   • amlodipine (NORVASC) 5 MG Tab Take 5 mg by mouth.  0   • warfarin (COUMADIN) 7.5 MG Tab TAKE 1 & 1/2 TABLETS BY MOUTH ONCE DAILY 45 Tab 6   • carvedilol (COREG) 25 MG Tab Take 25 mg by mouth 2 times a day, with meals.     • aspirin EC (ECOTRIN) 81 MG TBEC Take 1 Tab by mouth every day. 30 Each 11   • zolpidem (AMBIEN CR) 12.5 MG CR tablet Take 1 Tab by mouth at bedtime as needed for Sleep (insomnia). (Patient not taking: Reported on 5/22/2019) 30 Tab 3   • amlodipine (NORVASC) 10 MG TABS TAKE 1 TABLET BY MOUTH EVERY DAY (Patient not taking: Reported on 5/22/2019) 30 Tab 6   • enalapril (VASOTEC) 20 MG tablet TAKE 1 TABLET BY MOUTH TWICE A DAY (Patient not taking: Reported on 2/8/2019) 60 Tab 3     No current facility-administered medications on file prior to visit.        Allergies: Patient has no known allergies.    ROS:   Constitutional: Denies fevers, chills, night sweats, fatigue, +weight gain  Eyes: Denies vision loss, pain, drainage, double vision  Ears, Nose, Throat: Denies earache, difficulty hearing, tinnitus, nasal congestion, hoarseness  Cardiovascular: Denies chest pain, tightness, palpitations, orthopnea or edema  Respiratory: Denies shortness of breath, cough, wheezing, hemoptysis  Sleep: Denies daytime sleepiness, snoring, apneas, insomnia, morning headaches  GI: Denies heartburn, dysphagia, nausea, abdominal pain, diarrhea or constipation  : Denies frequent urination, hematuria, discharge or painful urination  Musculoskeletal: Denies back pain, painful joints, sore muscles  Neurological: Denies weakness or headaches  Skin: No rashes    /84 (BP Location: Left arm, Patient Position: Sitting, BP Cuff  "Size: Adult)   Pulse 73   Resp 16   Ht 1.676 m (5' 6\")   Wt 77.5 kg (170 lb 14.4 oz)   SpO2 95%     Physical Exam:  Appearance: Well-nourished, well-developed, in no acute distress  HEENT: Normocephalic, atraumatic, white sclera, PERRLA, oropharynx clear  Neck: No adenopathy or masses  Respiratory: no intercostal retractions or accessory muscle use  Lungs auscultation: Clear to auscultation bilaterally  Cardiovascular: Regular rate rhythm. No murmurs, rubs or gallops.  No LE edema  Abdomen: soft, nondistended  Gait: Normal  Digits: No clubbing, cyanosis  Motor: No focal deficits  Orientation: Oriented to time, person and place    Diagnosis:  1. Narcolepsy with cataplexy  amphetamine-dextroamphetamine ER (ADDERALL XR) 30 MG XR capsule    DISCONTINUED: amphetamine-dextroamphetamine ER (ADDERALL XR) 30 MG XR capsule    DISCONTINUED: amphetamine-dextroamphetamine ER (ADDERALL XR) 30 MG XR capsule   2. Cardiomyopathy, nonischemic EF 15% no ascvd, query spasm vs. intracornary thrombus.         Plan:  The patient's narcolepsy is well controlled on Adderall XR 30 mg 2 to 3 tablets BID-TID.  He is filling prescriptions appropriately per Jesus Manuel.  Prescription refills provided.  More concerning is his cardiovascular status- we will request medical records from Dr. Maharaj and [facility] regarding status.  Return in about 3 months (around 8/22/2019).      "

## 2019-08-08 ENCOUNTER — ANTICOAGULATION MONITORING (OUTPATIENT)
Dept: VASCULAR LAB | Facility: MEDICAL CENTER | Age: 49
End: 2019-08-08

## 2019-08-08 DIAGNOSIS — I23.6 APICAL MURAL THROMBUS FOLLOWING MI (HCC): ICD-10-CM

## 2019-08-08 NOTE — PROGRESS NOTES
Discharged from [facility].  Anticoagulation managed by [facility]     Peri George, Clinical Pharmacist, CDE, CACP

## 2019-08-23 ENCOUNTER — SLEEP CENTER VISIT (OUTPATIENT)
Dept: SLEEP MEDICINE | Facility: MEDICAL CENTER | Age: 49
End: 2019-08-23
Payer: COMMERCIAL

## 2019-08-23 VITALS
RESPIRATION RATE: 16 BRPM | DIASTOLIC BLOOD PRESSURE: 92 MMHG | BODY MASS INDEX: 25.71 KG/M2 | OXYGEN SATURATION: 96 % | HEIGHT: 66 IN | WEIGHT: 160 LBS | HEART RATE: 74 BPM | SYSTOLIC BLOOD PRESSURE: 132 MMHG

## 2019-08-23 DIAGNOSIS — G47.411 NARCOLEPSY WITH CATAPLEXY: ICD-10-CM

## 2019-08-23 PROCEDURE — 99213 OFFICE O/P EST LOW 20 MIN: CPT | Performed by: INTERNAL MEDICINE

## 2019-08-23 RX ORDER — DEXTROAMPHETAMINE SACCHARATE, AMPHETAMINE ASPARTATE MONOHYDRATE, DEXTROAMPHETAMINE SULFATE AND AMPHETAMINE SULFATE 7.5; 7.5; 7.5; 7.5 MG/1; MG/1; MG/1; MG/1
CAPSULE, EXTENDED RELEASE ORAL
Qty: 180 CAP | Refills: 0 | Status: SHIPPED | OUTPATIENT
Start: 2019-09-07 | End: 2019-08-23 | Stop reason: SDUPTHER

## 2019-08-23 RX ORDER — DEXTROAMPHETAMINE SACCHARATE, AMPHETAMINE ASPARTATE MONOHYDRATE, DEXTROAMPHETAMINE SULFATE AND AMPHETAMINE SULFATE 7.5; 7.5; 7.5; 7.5 MG/1; MG/1; MG/1; MG/1
CAPSULE, EXTENDED RELEASE ORAL
Qty: 180 CAP | Refills: 0 | Status: SHIPPED | OUTPATIENT
Start: 2019-10-07 | End: 2019-08-23 | Stop reason: SDUPTHER

## 2019-08-23 RX ORDER — ATORVASTATIN CALCIUM 40 MG/1
40 TABLET, FILM COATED ORAL
COMMUNITY
End: 2023-12-01

## 2019-08-23 RX ORDER — DEXTROAMPHETAMINE SACCHARATE, AMPHETAMINE ASPARTATE MONOHYDRATE, DEXTROAMPHETAMINE SULFATE AND AMPHETAMINE SULFATE 7.5; 7.5; 7.5; 7.5 MG/1; MG/1; MG/1; MG/1
CAPSULE, EXTENDED RELEASE ORAL
Qty: 180 CAP | Refills: 0 | Status: SHIPPED | OUTPATIENT
Start: 2019-11-07 | End: 2019-11-22 | Stop reason: SDUPTHER

## 2019-08-23 RX ORDER — SACUBITRIL AND VALSARTAN 49; 51 MG/1; MG/1
1 TABLET, FILM COATED ORAL 2 TIMES DAILY
Refills: 3 | Status: ON HOLD | COMMUNITY
Start: 2019-08-05 | End: 2023-12-07

## 2019-08-23 NOTE — PROGRESS NOTES
Chief Complaint   Patient presents with   • Follow-Up     narcolepsy       HPI: This patient is a 49 y.o. Male  who returns for routine follow-up of narcolepsy with cataplexy. He uses Adderall XR 30 mg 2-3 tablets BID to TID. Alon was queried and shows appropriate filling of prescriptions. He works for [employer] and has been putting in long work hours with 10-12 hr shifts, covering for coworkers on vacation.   He also has an 8-week old baby, and has consequently not been attaining as much sleep as usual.  Denies worsening cataplexy, however has noted increased daytime sleepiness.  He has been increasing his sleep hours on days off.  He has a history of cardiomyopathy post-pacemaker and defibrillator, follows with Dr. Maharaj.  He saw heart transplant clinic and was felt to have improved and not require listing at this time. Denies shortness of breath or orthopnea.    Past Medical History:   Diagnosis Date   • Abnormal electrocardiogram 7/27/2011   • Bronchitis    • Chronic systolic congestive heart failure (HCC) 7/27/2011   • Congestive heart failure (HCC)    • Dizziness 3/31/2011   • Fall    • Heart valve disease    • Hyperlipidemia    • Hypertension    • Male erectile disorder 8/9/2011   • Mixed hyperlipidemia 8/9/2011   • Narcolepsy with cataplexy    • Pulmonary embolism (HCC)    • STEMI (ST elevation myocardial infarction) pk  trop 150 POBA LAD and DIAG med rx. 12/18/2010       Social History     Socioeconomic History   • Marital status:      Spouse name: Not on file   • Number of children: Not on file   • Years of education: Not on file   • Highest education level: Not on file   Occupational History   • Not on file   Social Needs   • Financial resource strain: Not on file   • Food insecurity:     Worry: Not on file     Inability: Not on file   • Transportation needs:     Medical: Not on file     Non-medical: Not on file   Tobacco Use   • Smoking status: Former Smoker     Packs/day: 0.50     Years:  15.00     Pack years: 7.50     Types: Cigarettes     Last attempt to quit: 2010     Years since quittin.7   • Smokeless tobacco: Never Used   • Tobacco comment: 1/2 pack/day   Substance and Sexual Activity   • Alcohol use: Yes     Alcohol/week: 0.0 oz     Comment: occasional   • Drug use: No   • Sexual activity: Not on file   Lifestyle   • Physical activity:     Days per week: Not on file     Minutes per session: Not on file   • Stress: Not on file   Relationships   • Social connections:     Talks on phone: Not on file     Gets together: Not on file     Attends Sikhism service: Not on file     Active member of club or organization: Not on file     Attends meetings of clubs or organizations: Not on file     Relationship status: Not on file   • Intimate partner violence:     Fear of current or ex partner: Not on file     Emotionally abused: Not on file     Physically abused: Not on file     Forced sexual activity: Not on file   Other Topics Concern   • Not on file   Social History Narrative   • Not on file       Family History   Problem Relation Age of Onset   • Sleep Apnea Neg Hx        Current Outpatient Medications on File Prior to Visit   Medication Sig Dispense Refill   • atorvastatin (LIPITOR) 40 MG Tab Take 40 mg by mouth.     • ENTRESTO 49-51 MG Tab tablet TAKE 1 TABLET BY MOUTH TWICE A DAY**PA  3   • ranitidine (ZANTAC) 300 MG tablet Take 300 mg by mouth.  0   • warfarin (COUMADIN) 7.5 MG Tab TAKE 1 & 1/2 TABLETS BY MOUTH ONCE DAILY 45 Tab 6   • carvedilol (COREG) 25 MG Tab Take 25 mg by mouth 2 times a day, with meals.     • zolpidem (AMBIEN CR) 12.5 MG CR tablet Take 1 Tab by mouth at bedtime as needed for Sleep (insomnia). (Patient not taking: Reported on 2019) 30 Tab 3   • amlodipine (NORVASC) 10 MG TABS TAKE 1 TABLET BY MOUTH EVERY DAY (Patient not taking: Reported on 2019) 30 Tab 6   • enalapril (VASOTEC) 20 MG tablet TAKE 1 TABLET BY MOUTH TWICE A DAY (Patient not taking: Reported  "on 2/8/2019) 60 Tab 3   • aspirin EC (ECOTRIN) 81 MG TBEC Take 1 Tab by mouth every day. 30 Each 11     No current facility-administered medications on file prior to visit.        Allergies: Patient has no known allergies.    ROS:   Constitutional: Denies fevers, chills, night sweats, fatigue or weight loss  Eyes: Denies vision loss, pain, drainage, double vision  Ears, Nose, Throat: Denies earache, difficulty hearing, tinnitus, nasal congestion, hoarseness  Cardiovascular: Denies chest pain, tightness, palpitations, orthopnea or edema  Respiratory: Denies shortness of breath, cough, wheezing, hemoptysis  Sleep: Denies daytime sleepiness, snoring, apneas, insomnia, morning headaches  GI: Denies heartburn, dysphagia, nausea, abdominal pain, diarrhea or constipation  : Denies frequent urination, hematuria, discharge or painful urination  Musculoskeletal: Denies back pain, painful joints, sore muscles  Neurological: Denies weakness or headaches  Skin: No rashes    /92 (BP Location: Right arm, Patient Position: Sitting, BP Cuff Size: Adult)   Pulse 74   Resp 16   Ht 1.676 m (5' 6\")   Wt 72.6 kg (160 lb)   SpO2 96%     Physical Exam:  Appearance: Well-nourished, well-developed, in no acute distress  HEENT: Normocephalic, atraumatic, white sclera, PERRLA  Neck: No adenopathy or masses  Respiratory: no intercostal retractions or accessory muscle use  Lungs auscultation: Clear to auscultation bilaterally  Cardiovascular: Regular rate rhythm. No murmurs, rubs or gallops.  No LE edema  Abdomen: soft, nondistended  Gait: Normal  Digits: No clubbing, cyanosis  Motor: No focal deficits  Orientation: Oriented to time, person and place    Diagnosis:  1. Narcolepsy with cataplexy  amphetamine-dextroamphetamine ER (ADDERALL XR) 30 MG XR capsule    DISCONTINUED: amphetamine-dextroamphetamine ER (ADDERALL XR) 30 MG XR capsule    DISCONTINUED: amphetamine-dextroamphetamine ER (ADDERALL XR) 30 MG XR capsule       Plan:  The " patient has narcolepsy with cataplexy, with exacerbation of daytime hypersomnolence which is situational (new baby, increased work hours).  We discussed using FMLA  to primarily reduce his work hours, if needed.  He felt he could cope with his schedule and will let us know if he needs to use FMLA.  He is filling his prescriptions appropriately.  He was provided prescription refill for Adderall XR 30 mg 2 to 3 tablets twice daily to 3 times daily.  Return in about 3 months (around 11/23/2019).

## 2019-11-22 ENCOUNTER — SLEEP CENTER VISIT (OUTPATIENT)
Dept: SLEEP MEDICINE | Facility: MEDICAL CENTER | Age: 49
End: 2019-11-22
Payer: COMMERCIAL

## 2019-11-22 VITALS
OXYGEN SATURATION: 94 % | RESPIRATION RATE: 16 BRPM | HEART RATE: 94 BPM | HEIGHT: 66 IN | DIASTOLIC BLOOD PRESSURE: 78 MMHG | SYSTOLIC BLOOD PRESSURE: 116 MMHG | WEIGHT: 159 LBS | BODY MASS INDEX: 25.55 KG/M2

## 2019-11-22 DIAGNOSIS — G47.411 NARCOLEPSY WITH CATAPLEXY: ICD-10-CM

## 2019-11-22 PROCEDURE — 99213 OFFICE O/P EST LOW 20 MIN: CPT | Performed by: INTERNAL MEDICINE

## 2019-11-22 RX ORDER — DEXTROAMPHETAMINE SACCHARATE, AMPHETAMINE ASPARTATE MONOHYDRATE, DEXTROAMPHETAMINE SULFATE AND AMPHETAMINE SULFATE 7.5; 7.5; 7.5; 7.5 MG/1; MG/1; MG/1; MG/1
CAPSULE, EXTENDED RELEASE ORAL
Qty: 180 CAP | Refills: 0 | Status: SHIPPED | OUTPATIENT
Start: 2020-01-07 | End: 2020-01-29 | Stop reason: SDUPTHER

## 2019-11-22 RX ORDER — DEXTROAMPHETAMINE SACCHARATE, AMPHETAMINE ASPARTATE MONOHYDRATE, DEXTROAMPHETAMINE SULFATE AND AMPHETAMINE SULFATE 7.5; 7.5; 7.5; 7.5 MG/1; MG/1; MG/1; MG/1
CAPSULE, EXTENDED RELEASE ORAL
Qty: 180 CAP | Refills: 0 | Status: SHIPPED | OUTPATIENT
Start: 2019-12-07 | End: 2019-11-22 | Stop reason: SDUPTHER

## 2019-11-22 NOTE — LETTER
November 22, 2019        To whom it may concern:      Re: Johny Toledo        Please excuse Mr. Toledo's absence from work today.  He had a scheduled, mandatory medical appointment.              Sincerely,          Sumi Sharp M.D.

## 2019-11-22 NOTE — PROGRESS NOTES
Chief Complaint   Patient presents with   • Follow-Up     narcolepsy with cataplexy       HPI: This patient is a 49 y.o. Male  who returns for routine follow-up of narcolepsy with cataplexy. He uses Adderall XR 30 mg 2-3 tablets BID to TID. Alon was queried and shows appropriate filling of prescriptions. He works for [employer] and is working extended hours with 10-12 hr shifts, 6 days/week. He also has an infant and has been up with her every 2 hours.  Consequently he is not attaining his usual hours of sleep.  He has had increased sleep paralysis and hypnagogic hallucinations.  Denies sleep attacks over has been using caffeine daily (4-5 cups coffee) as an added stimulant.  Denies cataplexy.  Denies adverse effects on Adderall.  He has a history of cardiomyopathy post-pacemaker and defibrillator, follows with Dr. Maharaj.  He saw heart transplant clinic and was felt to have improved and not require listing at this time. Denies shortness of breath, edema or orthopnea.    Past Medical History:   Diagnosis Date   • Abnormal electrocardiogram 7/27/2011   • Bronchitis    • Chronic systolic congestive heart failure (HCC) 7/27/2011   • Congestive heart failure (HCC)    • Dizziness 3/31/2011   • Fall    • Heart valve disease    • Hyperlipidemia    • Hypertension    • Male erectile disorder 8/9/2011   • Mixed hyperlipidemia 8/9/2011   • Narcolepsy with cataplexy    • Pulmonary embolism (HCC)    • STEMI (ST elevation myocardial infarction) pk  trop 150 POBA LAD and DIAG med rx. 12/18/2010       Social History     Socioeconomic History   • Marital status:      Spouse name: Not on file   • Number of children: Not on file   • Years of education: Not on file   • Highest education level: Not on file   Occupational History   • Not on file   Social Needs   • Financial resource strain: Not on file   • Food insecurity:     Worry: Not on file     Inability: Not on file   • Transportation needs:     Medical: Not on file      Non-medical: Not on file   Tobacco Use   • Smoking status: Former Smoker     Packs/day: 0.50     Years: 15.00     Pack years: 7.50     Types: Cigarettes     Last attempt to quit: 2010     Years since quittin.9   • Smokeless tobacco: Never Used   • Tobacco comment: 1/2 pack/day   Substance and Sexual Activity   • Alcohol use: Yes     Alcohol/week: 0.0 oz     Comment: occasional   • Drug use: No   • Sexual activity: Not on file   Lifestyle   • Physical activity:     Days per week: Not on file     Minutes per session: Not on file   • Stress: Not on file   Relationships   • Social connections:     Talks on phone: Not on file     Gets together: Not on file     Attends [employer] service: Not on file     Active member of club or organization: Not on file     Attends meetings of clubs or organizations: Not on file     Relationship status: Not on file   • Intimate partner violence:     Fear of current or ex partner: Not on file     Emotionally abused: Not on file     Physically abused: Not on file     Forced sexual activity: Not on file   Other Topics Concern   • Not on file   Social History Narrative   • Not on file       Family History   Problem Relation Age of Onset   • Sleep Apnea Neg Hx        Current Outpatient Medications on File Prior to Visit   Medication Sig Dispense Refill   • atorvastatin (LIPITOR) 40 MG Tab Take 40 mg by mouth.     • ENTRESTO 49-51 MG Tab tablet TAKE 1 TABLET BY MOUTH TWICE A DAY**PA  3   • ranitidine (ZANTAC) 300 MG tablet Take 300 mg by mouth.  0   • warfarin (COUMADIN) 7.5 MG Tab TAKE 1 & 1/2 TABLETS BY MOUTH ONCE DAILY 45 Tab 6   • carvedilol (COREG) 25 MG Tab Take 25 mg by mouth 2 times a day, with meals.     • zolpidem (AMBIEN CR) 12.5 MG CR tablet Take 1 Tab by mouth at bedtime as needed for Sleep (insomnia). (Patient not taking: Reported on 2019) 30 Tab 3   • amlodipine (NORVASC) 10 MG TABS TAKE 1 TABLET BY MOUTH EVERY DAY (Patient not taking: Reported on 2019) 30 Tab  "6   • enalapril (VASOTEC) 20 MG tablet TAKE 1 TABLET BY MOUTH TWICE A DAY (Patient not taking: Reported on 2/8/2019) 60 Tab 3   • aspirin EC (ECOTRIN) 81 MG TBEC Take 1 Tab by mouth every day. (Patient not taking: Reported on 11/22/2019) 30 Each 11     No current facility-administered medications on file prior to visit.        Allergies: Patient has no known allergies.    ROS:   Constitutional: Denies fevers, chills, night sweats, fatigue or weight loss  Eyes: Denies vision loss, pain, drainage, double vision  Ears, Nose, Throat: Denies earache, difficulty hearing, tinnitus, nasal congestion, hoarseness  Cardiovascular: Denies chest pain, tightness, palpitations, orthopnea or edema  Respiratory: Denies shortness of breath, cough, wheezing, hemoptysis  Sleep: As in HPI  GI: Denies heartburn, dysphagia, nausea, abdominal pain, diarrhea or constipation  : Denies frequent urination, hematuria, discharge or painful urination  Musculoskeletal: Denies back pain, painful joints, sore muscles  Neurological: Denies weakness or headaches  Skin: No rashes    /78 (BP Location: Left arm, Patient Position: Sitting, BP Cuff Size: Adult)   Pulse 94   Resp 16   Ht 1.676 m (5' 6\")   Wt 72.1 kg (159 lb)   SpO2 94%     Physical Exam:  Appearance: Well-nourished, well-developed, in no acute distress  HEENT: Normocephalic, atraumatic, white sclera, PERRLA, oropharynx clear  Neck: No adenopathy or masses  Respiratory: no intercostal retractions or accessory muscle use  Lungs auscultation: Clear to auscultation bilaterally  Cardiovascular: Regular rate rhythm. No murmurs, rubs or gallops.  No LE edema  Abdomen: soft, nondistended  Gait: Normal  Digits: No clubbing, cyanosis  Motor: No focal deficits  Orientation: Oriented to time, person and place    Diagnosis:  1. Narcolepsy with cataplexy  amphetamine-dextroamphetamine ER (ADDERALL XR) 30 MG XR capsule    DISCONTINUED: amphetamine-dextroamphetamine ER (ADDERALL XR) 30 MG XR " capsule       Plan:  Johny has narcolepsy with cataplexy, with exacerbation of hypersomnolence due to situational factors (new infant, extended holiday work hours).  He is looking into [place] to allow him to reduce work hours, if needed.    He is encouraged to attain minimal 7 hours of sleep nightly, with power naps in the day as needed.  Continue Adderall XR 30 mg 2-3 tabs BID to TID, which has been his maintenance dose for many years.  Return in about 11 weeks (around 2/7/2020).

## 2020-01-29 DIAGNOSIS — G47.411 NARCOLEPSY WITH CATAPLEXY: ICD-10-CM

## 2020-01-29 RX ORDER — DEXTROAMPHETAMINE SACCHARATE, AMPHETAMINE ASPARTATE MONOHYDRATE, DEXTROAMPHETAMINE SULFATE AND AMPHETAMINE SULFATE 7.5; 7.5; 7.5; 7.5 MG/1; MG/1; MG/1; MG/1
CAPSULE, EXTENDED RELEASE ORAL
Qty: 180 CAP | Refills: 0 | Status: SHIPPED | OUTPATIENT
Start: 2020-01-29 | End: 2020-02-12 | Stop reason: SDUPTHER

## 2020-01-29 NOTE — TELEPHONE ENCOUNTER
Pt called and is requesting a RF on his Adderall.  He said he is going run out before his appt.     Have we ever prescribed this med? Yes.  If yes, what date? 2020(Aron)    Last OV: 19 with Dr Sharp    Next OV: 2020 with Dr Sharp    DX: Narcolepsy with cataplexy (G47.411)    Medications:   Requested Prescriptions     Pending Prescriptions Disp Refills   • amphetamine-dextroamphetamine ER (ADDERALL XR) 30 MG XR capsule 180 Cap 0     Si-3 tabs by mouth up to 2-3 times per day as needed for Narcolepsy symptoms.           Fill Date ID   Written Drug Qty Days Prescriber Rx # Pharmacy Refill   Daily Dose* Pymt Type      2020  2   2019  Dextroamp-Amphet Er 30 MG Cap  180.00 30  [facility]   [id number]   [place] ([id number])   0   [facility]   [place]   2019  2   2019  Dextroamp-Amphet Er 30 MG Cap  180.00 30  Szo   [id number]   [place] ([id number])   0   [place]   2019  2   2019  Dextroamp-Amphet Er 30 MG Cap  180.00 30  [facility]   [id number]   [place] ([id number])   0   [place]   10/07/2019

## 2020-02-12 ENCOUNTER — SLEEP CENTER VISIT (OUTPATIENT)
Dept: SLEEP MEDICINE | Facility: MEDICAL CENTER | Age: 50
End: 2020-02-12
Payer: COMMERCIAL

## 2020-02-12 VITALS
WEIGHT: 160 LBS | HEART RATE: 94 BPM | BODY MASS INDEX: 25.71 KG/M2 | OXYGEN SATURATION: 94 % | RESPIRATION RATE: 16 BRPM | DIASTOLIC BLOOD PRESSURE: 88 MMHG | SYSTOLIC BLOOD PRESSURE: 124 MMHG | HEIGHT: 66 IN

## 2020-02-12 DIAGNOSIS — G47.411 NARCOLEPSY WITH CATAPLEXY: ICD-10-CM

## 2020-02-12 PROCEDURE — 99213 OFFICE O/P EST LOW 20 MIN: CPT | Performed by: INTERNAL MEDICINE

## 2020-02-12 RX ORDER — DEXTROAMPHETAMINE SACCHARATE, AMPHETAMINE ASPARTATE MONOHYDRATE, DEXTROAMPHETAMINE SULFATE AND AMPHETAMINE SULFATE 7.5; 7.5; 7.5; 7.5 MG/1; MG/1; MG/1; MG/1
CAPSULE, EXTENDED RELEASE ORAL
Qty: 180 CAP | Refills: 0 | Status: SHIPPED | OUTPATIENT
Start: 2020-03-04 | End: 2020-02-12 | Stop reason: SDUPTHER

## 2020-02-12 RX ORDER — DEXTROAMPHETAMINE SACCHARATE, AMPHETAMINE ASPARTATE MONOHYDRATE, DEXTROAMPHETAMINE SULFATE AND AMPHETAMINE SULFATE 7.5; 7.5; 7.5; 7.5 MG/1; MG/1; MG/1; MG/1
CAPSULE, EXTENDED RELEASE ORAL
Qty: 180 CAP | Refills: 0 | Status: SHIPPED | OUTPATIENT
Start: 2020-05-04 | End: 2020-06-04 | Stop reason: SDUPTHER

## 2020-02-12 RX ORDER — DEXTROAMPHETAMINE SACCHARATE, AMPHETAMINE ASPARTATE MONOHYDRATE, DEXTROAMPHETAMINE SULFATE AND AMPHETAMINE SULFATE 7.5; 7.5; 7.5; 7.5 MG/1; MG/1; MG/1; MG/1
CAPSULE, EXTENDED RELEASE ORAL
Qty: 180 CAP | Refills: 0 | Status: SHIPPED | OUTPATIENT
Start: 2020-04-04 | End: 2020-05-04

## 2020-02-12 NOTE — PROGRESS NOTES
Chief Complaint   Patient presents with   • Follow-Up     narcolepsy with cataplexy       HPI: This patient is a 49 y.o. Male  who returns for routine follow-up of narcolepsy with cataplexy. He uses Adderall XR 30 mg 2-3 tablets BID to TID. Alon was queried and shows appropriate filling of prescriptions. He works for [employer].  Now post holidays his work hours have been been more manageable.  Denies sleep paralysis or hallucinations. Denies cataplexy.  Denies adverse effects on Adderall.  His hypersomnia has been reasonably controlled.  He has a history of cardiomyopathy post-pacemaker and defibrillator, follows with Dr. Maharaj.  He saw heart transplant clinic and was felt to have improved and not require listing at this time. EF up to 35% per his report. Denies shortness of breath, edema or orthopnea.       Past Medical History:   Diagnosis Date   • Abnormal electrocardiogram 7/27/2011   • Bronchitis    • Chronic systolic congestive heart failure (HCC) 7/27/2011   • Congestive heart failure (HCC)    • Dizziness 3/31/2011   • Fall    • Heart valve disease    • Hyperlipidemia    • Hypertension    • Male erectile disorder 8/9/2011   • Mixed hyperlipidemia 8/9/2011   • Narcolepsy with cataplexy    • Pulmonary embolism (HCC)    • STEMI (ST elevation myocardial infarction) pk  trop 150 POBA LAD and DIAG med rx. 12/18/2010       Social History     Socioeconomic History   • Marital status:      Spouse name: Not on file   • Number of children: Not on file   • Years of education: Not on file   • Highest education level: Not on file   Occupational History   • Not on file   Social Needs   • Financial resource strain: Not on file   • Food insecurity     Worry: Not on file     Inability: Not on file   • Transportation needs     Medical: Not on file     Non-medical: Not on file   Tobacco Use   • Smoking status: Former Smoker     Packs/day: 0.50     Years: 15.00     Pack years: 7.50     Types: Cigarettes     Last  attempt to quit: 2010     Years since quittin.2   • Smokeless tobacco: Never Used   • Tobacco comment: 1/2 pack/day   Substance and Sexual Activity   • Alcohol use: Yes     Alcohol/week: 0.0 oz     Comment: occasional   • Drug use: No   • Sexual activity: Not on file   Lifestyle   • Physical activity     Days per week: Not on file     Minutes per session: Not on file   • Stress: Not on file   Relationships   • Social connections     Talks on phone: Not on file     Gets together: Not on file     Attends Restoration[employer] service: Not on file     Active member of club or organization: Not on file     Attends meetings of clubs or organizations: Not on file     Relationship status: Not on file   • Intimate partner violence     Fear of current or ex partner: Not on file     Emotionally abused: Not on file     Physically abused: Not on file     Forced sexual activity: Not on file   Other Topics Concern   • Not on file   Social History Narrative   • Not on file       Family History   Problem Relation Age of Onset   • Sleep Apnea Neg Hx        Current Outpatient Medications on File Prior to Visit   Medication Sig Dispense Refill   • atorvastatin (LIPITOR) 40 MG Tab Take 40 mg by mouth.     • ENTRESTO 49-51 MG Tab tablet TAKE 1 TABLET BY MOUTH TWICE A DAY**PA  3   • warfarin (COUMADIN) 7.5 MG Tab TAKE 1 & 1/2 TABLETS BY MOUTH ONCE DAILY 45 Tab 6   • carvedilol (COREG) 25 MG Tab Take 25 mg by mouth 2 times a day, with meals.     • amlodipine (NORVASC) 10 MG TABS TAKE 1 TABLET BY MOUTH EVERY DAY 30 Tab 6   • aspirin EC (ECOTRIN) 81 MG TBEC Take 1 Tab by mouth every day. 30 Each 11   • zolpidem (AMBIEN CR) 12.5 MG CR tablet Take 1 Tab by mouth at bedtime as needed for Sleep (insomnia). (Patient not taking: Reported on 2019) 30 Tab 3   • enalapril (VASOTEC) 20 MG tablet TAKE 1 TABLET BY MOUTH TWICE A DAY (Patient not taking: Reported on 2019) 60 Tab 3     No current facility-administered medications on file prior to  "visit.        Allergies: Patient has no known allergies.    ROS:   Constitutional: Denies fevers, chills, night sweats, fatigue or weight loss  Eyes: Denies vision loss, pain, drainage, double vision  Ears, Nose, Throat: Denies earache, difficulty hearing, tinnitus, nasal congestion, hoarseness  Cardiovascular: Denies chest pain, tightness, palpitations, orthopnea or edema  Respiratory: Denies shortness of breath, cough, wheezing, hemoptysis  Sleep: Denies daytime sleepiness, snoring, apneas, insomnia, morning headaches  GI: Denies heartburn, dysphagia, nausea, abdominal pain, diarrhea or constipation  : Denies frequent urination, hematuria, discharge or painful urination  Musculoskeletal: Denies back pain, painful joints, sore muscles  Neurological: Denies weakness or headaches  Skin: No rashes    /88 (BP Location: Left arm, Patient Position: Sitting, BP Cuff Size: Adult)   Pulse 94   Resp 16   Ht 1.676 m (5' 6\")   Wt 72.6 kg (160 lb)   SpO2 94%     Physical Exam:  Appearance: Well-nourished, well-developed, in no acute distress  HEENT: Normocephalic, atraumatic, white sclera, PERRLA,  Neck: No adenopathy or masses  Respiratory: no intercostal retractions or accessory muscle use  Lungs auscultation: Clear to auscultation bilaterally  Cardiovascular: Regular rate rhythm. No murmurs, rubs or gallops.  No LE edema  Abdomen: soft, nondistended  Gait: Normal  Digits: No clubbing, cyanosis  Motor: No focal deficits  Orientation: Oriented to time, person and place    Diagnosis:  1. Narcolepsy with cataplexy  amphetamine-dextroamphetamine ER (ADDERALL XR) 30 MG XR capsule    amphetamine-dextroamphetamine ER (ADDERALL XR) 30 MG XR capsule    DISCONTINUED: amphetamine-dextroamphetamine ER (ADDERALL XR) 30 MG XR capsule    DISCONTINUED: amphetamine-dextroamphetamine ER (ADDERALL XR) 30 MG XR capsule       Plan:  Patient's narcolepsy is under adequate control with Adderall.  He is filling prescriptions " appropriately.  Prescription refills provided.  Maintain good sleep hygiene and adequate hours of sleep.  This can be challenging with a  and his work hours however he appears to be coping well at this time.  Return in about 6 months (around 2020).

## 2020-06-04 DIAGNOSIS — G47.411 NARCOLEPSY WITH CATAPLEXY: ICD-10-CM

## 2020-06-04 RX ORDER — DEXTROAMPHETAMINE SACCHARATE, AMPHETAMINE ASPARTATE MONOHYDRATE, DEXTROAMPHETAMINE SULFATE AND AMPHETAMINE SULFATE 7.5; 7.5; 7.5; 7.5 MG/1; MG/1; MG/1; MG/1
CAPSULE, EXTENDED RELEASE ORAL
Qty: 180 CAP | Refills: 0 | Status: SHIPPED | OUTPATIENT
Start: 2020-06-04 | End: 2020-07-02 | Stop reason: SDUPTHER

## 2020-06-04 NOTE — TELEPHONE ENCOUNTER
Have we ever prescribed this med? Yes.  If yes, what date? 5/04/2020    Last OV: 2/12/2020  Aron    Next OV: 8/14/2020  [facility]    DX: Narcolepsy    Medications: Adderall

## 2020-06-04 NOTE — TELEPHONE ENCOUNTER
Called patient to inform him of his rx refill ready to be picked up. Patient said he will be able to stop by tomorrow. I let him know that the clinic will be closing in the afternoon and to  his prescription before 11am, patient is aware and indicated understanding.

## 2020-07-02 DIAGNOSIS — G47.411 NARCOLEPSY WITH CATAPLEXY: ICD-10-CM

## 2020-07-02 RX ORDER — DEXTROAMPHETAMINE SACCHARATE, AMPHETAMINE ASPARTATE MONOHYDRATE, DEXTROAMPHETAMINE SULFATE AND AMPHETAMINE SULFATE 7.5; 7.5; 7.5; 7.5 MG/1; MG/1; MG/1; MG/1
CAPSULE, EXTENDED RELEASE ORAL
Qty: 180 CAP | Refills: 0 | Status: SHIPPED | OUTPATIENT
Start: 2020-07-02 | End: 2020-08-03 | Stop reason: SDUPTHER

## 2020-07-02 NOTE — TELEPHONE ENCOUNTER
Can you call patient to inform her when RX has been signed and ready for . Please and thank you !

## 2020-07-02 NOTE — TELEPHONE ENCOUNTER
Have we ever prescribed this med? Yes.  If yes, what date? 06/04/20 Dr. Sharp    Last OV: 02/12/20 Dr. Sharp    Next OV: 08/14/2020 Dr. Sharp    DX: Narcolepsy with cataplexy     Medications: amphetamine-dextroamphetamine ER (ADDERALL XR) 30 MG XR capsule

## 2020-08-03 DIAGNOSIS — G47.411 NARCOLEPSY WITH CATAPLEXY: ICD-10-CM

## 2020-08-03 RX ORDER — DEXTROAMPHETAMINE SACCHARATE, AMPHETAMINE ASPARTATE MONOHYDRATE, DEXTROAMPHETAMINE SULFATE AND AMPHETAMINE SULFATE 7.5; 7.5; 7.5; 7.5 MG/1; MG/1; MG/1; MG/1
CAPSULE, EXTENDED RELEASE ORAL
Qty: 180 CAP | Refills: 0 | Status: SHIPPED | OUTPATIENT
Start: 2020-08-03 | End: 2020-09-02 | Stop reason: SDUPTHER

## 2020-08-03 NOTE — TELEPHONE ENCOUNTER
Have we ever prescribed this med? YesYes.  If yes, what date? 07/02/2020    Last OV: 02/12/2020 - Dr. Sharp    Next OV: 08/14/2020 - Dr. Sharp    DX: Narcolepsy with Cataplexy     Medications: Adderall

## 2020-08-07 ENCOUNTER — TELEPHONE (OUTPATIENT)
Dept: PULMONOLOGY | Facility: HOSPICE | Age: 50
End: 2020-08-07

## 2020-08-07 NOTE — TELEPHONE ENCOUNTER
The patient called to ask about his Adderall script.  I told him that it should be at the pharmacy now.

## 2020-08-14 ENCOUNTER — TELEPHONE (OUTPATIENT)
Dept: SLEEP MEDICINE | Facility: MEDICAL CENTER | Age: 50
End: 2020-08-14

## 2020-08-14 ENCOUNTER — APPOINTMENT (OUTPATIENT)
Dept: SLEEP MEDICINE | Facility: MEDICAL CENTER | Age: 50
End: 2020-08-14
Payer: COMMERCIAL

## 2020-08-14 NOTE — TELEPHONE ENCOUNTER
Patient was wondering if he would be able to get a 3 month RX for his medication? He states it is too difficult to get the time away from work to  RX's and he's starting to getting written up for taking so much time.

## 2020-08-28 NOTE — TELEPHONE ENCOUNTER
Rx was sent electronically 8/3/2020.    Pt will not have to hand carry anymore now that we can send it in.

## 2020-09-02 DIAGNOSIS — G47.411 NARCOLEPSY WITH CATAPLEXY: ICD-10-CM

## 2020-09-02 RX ORDER — DEXTROAMPHETAMINE SACCHARATE, AMPHETAMINE ASPARTATE MONOHYDRATE, DEXTROAMPHETAMINE SULFATE AND AMPHETAMINE SULFATE 7.5; 7.5; 7.5; 7.5 MG/1; MG/1; MG/1; MG/1
CAPSULE, EXTENDED RELEASE ORAL
Qty: 180 CAP | Refills: 0 | Status: SHIPPED | OUTPATIENT
Start: 2020-09-02 | End: 2020-10-02 | Stop reason: SDUPTHER

## 2020-09-02 NOTE — TELEPHONE ENCOUNTER
Have we ever prescribed this med? Yes.  If yes, what date? 08/03/2020    Last OV: 02/12/2020 - Dr. Sharp    Next OV: 10/16/2020 - Dr. Sharp    DX: Narcolepsy with cataplexy    Medications: Adderall

## 2020-10-02 DIAGNOSIS — G47.411 NARCOLEPSY WITH CATAPLEXY: ICD-10-CM

## 2020-10-02 RX ORDER — DEXTROAMPHETAMINE SACCHARATE, AMPHETAMINE ASPARTATE MONOHYDRATE, DEXTROAMPHETAMINE SULFATE AND AMPHETAMINE SULFATE 7.5; 7.5; 7.5; 7.5 MG/1; MG/1; MG/1; MG/1
CAPSULE, EXTENDED RELEASE ORAL
Qty: 180 CAP | Refills: 0 | Status: SHIPPED | OUTPATIENT
Start: 2020-10-02 | End: 2020-10-29 | Stop reason: SDUPTHER

## 2020-10-02 NOTE — TELEPHONE ENCOUNTER
Have we ever prescribed this med? Yes.  If yes, what date? 9/2/2020    Last OV: 2/12/2020 - Dr. Sharp    Next OV: 10/16/2020 - Dr. Sharp    DX: Narcolepsy with cataplexy    Medications: Narcolepsy

## 2020-10-16 ENCOUNTER — OFFICE VISIT (OUTPATIENT)
Dept: SLEEP MEDICINE | Facility: MEDICAL CENTER | Age: 50
End: 2020-10-16
Payer: COMMERCIAL

## 2020-10-16 VITALS
DIASTOLIC BLOOD PRESSURE: 80 MMHG | RESPIRATION RATE: 16 BRPM | SYSTOLIC BLOOD PRESSURE: 130 MMHG | HEART RATE: 99 BPM | WEIGHT: 166 LBS | HEIGHT: 66 IN | BODY MASS INDEX: 26.68 KG/M2 | OXYGEN SATURATION: 96 %

## 2020-10-16 DIAGNOSIS — G47.411 NARCOLEPSY WITH CATAPLEXY: ICD-10-CM

## 2020-10-16 PROCEDURE — 99213 OFFICE O/P EST LOW 20 MIN: CPT | Performed by: INTERNAL MEDICINE

## 2020-10-16 ASSESSMENT — FIBROSIS 4 INDEX: FIB4 SCORE: 0.81

## 2020-10-16 NOTE — PROGRESS NOTES
Chief Complaint   Patient presents with   • Narcolepsy     Last Seen 2020       HPI: This patient is a 50 y.o. Male  who returns for routine follow-up of narcolepsy with cataplexy. He uses Adderall XR 30 mg 2-3 tablets BID to TID. He works for [employer] and is working extended hours with 10-12 hr shifts, sometimes 6 days/week.  Consequently he has difficulty attaining adequate hours of sleep.   His sleepiness has worsened.  Denies cataplexy.    Tries to minimize caffeine use.  Denies adverse effects on Adderall.  Requires his FMLA forms refilled.  He has a history of cardiomyopathy post-pacemaker and defibrillator, follows with Dr. Maharaj.  He saw heart transplant clinic and has not require listing at this time. Denies shortness of breath, edema or orthopnea.       Past Medical History:   Diagnosis Date   • Abnormal electrocardiogram 2011   • Bronchitis    • Chronic systolic congestive heart failure (HCC) 2011   • Congestive heart failure (HCC)    • Dizziness 3/31/2011   • Fall    • Heart valve disease    • Hyperlipidemia    • Hypertension    • Male erectile disorder 2011   • Mixed hyperlipidemia 2011   • Narcolepsy with cataplexy    • Pulmonary embolism (HCC)    • STEMI (ST elevation myocardial infarction) pk  trop 150 POBA LAD and DIAG med rx. 2010       Social History     Socioeconomic History   • Marital status:      Spouse name: Not on file   • Number of children: Not on file   • Years of education: Not on file   • Highest education level: Not on file   Occupational History   • Not on file   Social Needs   • Financial resource strain: Not on file   • Food insecurity     Worry: Not on file     Inability: Not on file   • Transportation needs     Medical: Not on file     Non-medical: Not on file   Tobacco Use   • Smoking status: Former Smoker     Packs/day: 0.50     Years: 15.00     Pack years: 7.50     Types: Cigarettes     Quit date: 2010     Years since quittin.8    • Smokeless tobacco: Never Used   • Tobacco comment: 1/2 pack/day   Substance and Sexual Activity   • Alcohol use: Not Currently     Alcohol/week: 0.0 oz     Comment: occasional   • Drug use: No   • Sexual activity: Not on file   Lifestyle   • Physical activity     Days per week: Not on file     Minutes per session: Not on file   • Stress: Not on file   Relationships   • Social connections     Talks on phone: Not on file     Gets together: Not on file     Attends Mosque service: Not on file     Active member of club or organization: Not on file     Attends meetings of clubs or organizations: Not on file     Relationship status: Not on file   • Intimate partner violence     Fear of current or ex partner: Not on file     Emotionally abused: Not on file     Physically abused: Not on file     Forced sexual activity: Not on file   Other Topics Concern   • Not on file   Social History Narrative   • Not on file       Family History   Problem Relation Age of Onset   • Sleep Apnea Neg Hx        Current Outpatient Medications on File Prior to Visit   Medication Sig Dispense Refill   • amphetamine-dextroamphetamine ER (ADDERALL XR) 30 MG XR capsule 2-3 tabs by mouth up to 2-3 times per day as needed for Narcolepsy symptoms. 180 Cap 0   • atorvastatin (LIPITOR) 40 MG Tab Take 40 mg by mouth.     • ENTRESTO 49-51 MG Tab tablet TAKE 1 TABLET BY MOUTH TWICE A DAY**PA  3   • warfarin (COUMADIN) 7.5 MG Tab TAKE 1 & 1/2 TABLETS BY MOUTH ONCE DAILY 45 Tab 6   • carvedilol (COREG) 25 MG Tab Take 25 mg by mouth 2 times a day, with meals.     • amlodipine (NORVASC) 10 MG TABS TAKE 1 TABLET BY MOUTH EVERY DAY 30 Tab 6   • aspirin EC (ECOTRIN) 81 MG TBEC Take 1 Tab by mouth every day. 30 Each 11   • zolpidem (AMBIEN CR) 12.5 MG CR tablet Take 1 Tab by mouth at bedtime as needed for Sleep (insomnia). (Patient not taking: Reported on 5/22/2019) 30 Tab 3   • enalapril (VASOTEC) 20 MG tablet TAKE 1 TABLET BY MOUTH TWICE A DAY (Patient not  "taking: Reported on 2/8/2019) 60 Tab 3     No current facility-administered medications on file prior to visit.        Allergies: Patient has no known allergies.    ROS:   Constitutional: Denies fevers, chills, night sweats, fatigue or weight loss  Eyes: Denies vision loss, pain, drainage, double vision  Ears, Nose, Throat: Denies earache, difficulty hearing, tinnitus, nasal congestion, hoarseness  Cardiovascular: Denies chest pain, tightness, palpitations, orthopnea or edema  Respiratory: Denies shortness of breath, cough, wheezing, hemoptysis  Sleep: As in HPI  GI: Denies heartburn, dysphagia, nausea, abdominal pain, diarrhea or constipation  : Denies frequent urination, hematuria, discharge or painful urination  Musculoskeletal: Denies back pain, painful joints, sore muscles  Neurological: Denies weakness or headaches  Skin: No rashes    /80 (BP Location: Left arm, Patient Position: Sitting, BP Cuff Size: Adult)   Pulse 99   Resp 16   Ht 1.676 m (5' 6\")   Wt 75.3 kg (166 lb)   SpO2 96%     Physical Exam:  Appearance: Well-nourished, well-developed, in no acute distress  HEENT: Normocephalic, atraumatic, white sclera, PERRLA, masked  Neck: No adenopathy or masses  Respiratory: no intercostal retractions or accessory muscle use  Lungs auscultation: no wheezing  Cardiovascular: No LE edema  Abdomen: Nondistended  Gait: Normal  Digits: No clubbing, cyanosis  Motor: No focal deficits  Orientation: Oriented to time, person and place    Diagnosis:  1. Narcolepsy with cataplexy         Plan:  Johny has narcolepsy with cataplexy, with exacerbation of hypersomnolence due to situational factors (extended work hours).   He is encouraged to attain minimal 7 hours of sleep nightly, with power naps in the day as needed. FMLA forms filled.  Continue Adderall XR 30 mg 2-3 tabs BID to TID.  Use caffeine as added stimulant PRN.  Return in about 6 months (around 4/16/2021).      "

## 2020-10-16 NOTE — LETTER
October 16, 2020        To Whom it May Concern,    Re:Johnyjoel Toledo        Please excuse Mr. Toledo from work today due to a mandatory medical appointment.                  Sincerely,        Sumi Sharp M.D.

## 2020-10-21 ENCOUNTER — TELEPHONE (OUTPATIENT)
Dept: PULMONOLOGY | Facility: HOSPICE | Age: 50
End: 2020-10-21

## 2020-10-21 NOTE — TELEPHONE ENCOUNTER
Pt called back regarding the VM that was left by Lesli informing him that she does not see his FMLA paperwork in his chart. I informed the pt that I will send a message to Dr. Aron MENJIVAR to see if she knows where this paper is at. I also informed the pt that someone will give him a call back.

## 2020-10-21 NOTE — TELEPHONE ENCOUNTER
Caller: Johny    Phone Number: [phone] (home)    Message: Pt called and l/m re FMLA paperwork.  He called them and they didn't receive it.          10/21/20-Called pt and l/m. Notifying pt I received his message and I don't see his FMLA paperwork in his chart. Asked pt to return our call.

## 2020-10-26 ENCOUNTER — TELEPHONE (OUTPATIENT)
Dept: SLEEP MEDICINE | Facility: MEDICAL CENTER | Age: 50
End: 2020-10-26

## 2020-10-26 NOTE — TELEPHONE ENCOUNTER
Pt calling about FMLA again, I do not have record of the paperwork, advised him I would see if Aron has it, if not he will drop off again on Friday.

## 2020-10-28 NOTE — TELEPHONE ENCOUNTER
Completed FMLA forms have been scanned into media . Forms have also been faxed to [employer]: [phone]       Pt has been informed and he would like hard copies sent to him via mail to the address he has on file

## 2020-10-29 DIAGNOSIS — G47.411 NARCOLEPSY WITH CATAPLEXY: ICD-10-CM

## 2020-10-29 RX ORDER — DEXTROAMPHETAMINE SACCHARATE, AMPHETAMINE ASPARTATE MONOHYDRATE, DEXTROAMPHETAMINE SULFATE AND AMPHETAMINE SULFATE 7.5; 7.5; 7.5; 7.5 MG/1; MG/1; MG/1; MG/1
CAPSULE, EXTENDED RELEASE ORAL
Qty: 180 CAP | Refills: 0 | Status: SHIPPED | OUTPATIENT
Start: 2020-10-29 | End: 2020-11-29

## 2020-10-29 NOTE — TELEPHONE ENCOUNTER
Have we ever prescribed this med? Yes.  If yes, what date? 10/02/2020    Last OV: 10/16/2020 - Dr. Sharp    Next OV: 05/28/2021 - Dr. Sharp    DX: Narcolepsy  W/o cataplexy    Medications: Adderall

## 2020-11-02 ENCOUNTER — TELEPHONE (OUTPATIENT)
Dept: SLEEP MEDICINE | Facility: MEDICAL CENTER | Age: 50
End: 2020-11-02

## 2020-11-02 NOTE — TELEPHONE ENCOUNTER
The patient called and is asking for more questions to be filled out on his [facility] paperwork and then refaxed.  Paperwork is in your in basket or on your desk.  Thank you.

## 2020-11-03 NOTE — TELEPHONE ENCOUNTER
Corrections have been made by Dr. Sharp .     Forms have been scanned into media and re- faxed .     Pending confirmation to be received and I will scan into media

## 2020-11-30 DIAGNOSIS — G47.411 NARCOLEPSY WITH CATAPLEXY: ICD-10-CM

## 2020-11-30 RX ORDER — DEXTROAMPHETAMINE SACCHARATE, AMPHETAMINE ASPARTATE MONOHYDRATE, DEXTROAMPHETAMINE SULFATE AND AMPHETAMINE SULFATE 7.5; 7.5; 7.5; 7.5 MG/1; MG/1; MG/1; MG/1
30 CAPSULE, EXTENDED RELEASE ORAL 3 TIMES DAILY PRN
COMMUNITY
End: 2020-11-30 | Stop reason: SDUPTHER

## 2020-11-30 NOTE — TELEPHONE ENCOUNTER
Have we ever prescribed this med? Yes.  If yes, what date? 10/29/2020    Last OV: 10/16/2020 Aron    Next OV: 5/28/2021 Aron    DX: Narcolepsy    Medications: Adderall 30 mg XR

## 2020-12-01 RX ORDER — DEXTROAMPHETAMINE SACCHARATE, AMPHETAMINE ASPARTATE MONOHYDRATE, DEXTROAMPHETAMINE SULFATE AND AMPHETAMINE SULFATE 7.5; 7.5; 7.5; 7.5 MG/1; MG/1; MG/1; MG/1
CAPSULE, EXTENDED RELEASE ORAL
Qty: 180 CAP | Refills: 0 | Status: SHIPPED | OUTPATIENT
Start: 2020-12-01 | End: 2021-02-11 | Stop reason: SDUPTHER

## 2020-12-03 ENCOUNTER — TELEPHONE (OUTPATIENT)
Dept: SLEEP MEDICINE | Facility: MEDICAL CENTER | Age: 50
End: 2020-12-03

## 2020-12-03 NOTE — TELEPHONE ENCOUNTER
Called the pharmacy and gave a days supply.  Called and let the patient know that they are getting the Rx ready for him.

## 2020-12-03 NOTE — TELEPHONE ENCOUNTER
Johny called and said [facility] is missing information on the refill request sent. He would like you to call and see what information is missing and then call him back at [phone].    Thank you  Raquel

## 2020-12-23 ENCOUNTER — HOSPITAL ENCOUNTER (OUTPATIENT)
Facility: MEDICAL CENTER | Age: 50
End: 2020-12-23
Attending: PHYSICIAN ASSISTANT
Payer: COMMERCIAL

## 2020-12-23 ENCOUNTER — OFFICE VISIT (OUTPATIENT)
Dept: URGENT CARE | Facility: CLINIC | Age: 50
End: 2020-12-23
Payer: COMMERCIAL

## 2020-12-23 VITALS
SYSTOLIC BLOOD PRESSURE: 122 MMHG | OXYGEN SATURATION: 97 % | BODY MASS INDEX: 26.39 KG/M2 | WEIGHT: 164.2 LBS | TEMPERATURE: 98.4 F | RESPIRATION RATE: 16 BRPM | DIASTOLIC BLOOD PRESSURE: 82 MMHG | HEIGHT: 66 IN | HEART RATE: 122 BPM

## 2020-12-23 DIAGNOSIS — R52 GENERALIZED BODY ACHES: ICD-10-CM

## 2020-12-23 DIAGNOSIS — Z86.79 HISTORY OF CARDIOMYOPATHY: ICD-10-CM

## 2020-12-23 DIAGNOSIS — R42 DIZZINESS: ICD-10-CM

## 2020-12-23 DIAGNOSIS — Z20.822 EXPOSURE TO COVID-19 VIRUS: ICD-10-CM

## 2020-12-23 PROCEDURE — 99203 OFFICE O/P NEW LOW 30 MIN: CPT | Mod: CS | Performed by: PHYSICIAN ASSISTANT

## 2020-12-23 PROCEDURE — U0003 INFECTIOUS AGENT DETECTION BY NUCLEIC ACID (DNA OR RNA); SEVERE ACUTE RESPIRATORY SYNDROME CORONAVIRUS 2 (SARS-COV-2) (CORONAVIRUS DISEASE [COVID-19]), AMPLIFIED PROBE TECHNIQUE, MAKING USE OF HIGH THROUGHPUT TECHNOLOGIES AS DESCRIBED BY CMS-2020-01-R: HCPCS

## 2020-12-23 ASSESSMENT — ENCOUNTER SYMPTOMS
PALPITATIONS: 0
SINUS PAIN: 0
MYALGIAS: 1
WEAKNESS: 1
HEADACHES: 1
DIZZINESS: 1
FEVER: 1
EYE REDNESS: 0
VOMITING: 0
DIARRHEA: 0
NAUSEA: 0
NECK PAIN: 0
WHEEZING: 0
COUGH: 0
EYE DISCHARGE: 0
CHILLS: 1
SORE THROAT: 0
SPUTUM PRODUCTION: 0
SHORTNESS OF BREATH: 0
ABDOMINAL PAIN: 0

## 2020-12-23 NOTE — LETTER
December 23, 2020  Johny Jim Toledo   Your employee was seen in our clinic today.  A concern for COVID-19 has been identified and testing is in progress. We are asking you to excuse absences while following self-isolation protocol per Center for Disease Control (CDC) guidelines.  Your employee will be able to access test results through our electronic delivery system called [facility].     If the results of testing are positive, your employee should follow the CDC guidelines.  These are isolation for a minimum of 10 days and at least 24 hours have passed since your last fever without the use of fever-reducing medications and all other symptoms have improved.  The health department may contact you and provide further directions regarding self-isolation and return to work.     Negative result without close contact*:  If your employee was tested due to their symptoms without close contact to someone with COVID-19 and their test is negative: your employee should not return to work or regular activities until 24 hours after symptoms fully improve. (For example, if patient feels back to normal on Tuesday, should remain isolated through Wednesday).      Negative with close contact*:  If your employee was tested due to close contact to someone, they should self isolate for 14 days after their exposure.    Negative with positive household member  If your employee was due to a positive household member they should still quarantine for a period of 14 days.  The 14 day period begins once the household member is isolated. If unable to quarantine (for example mom from infant), the CDC advises an additional 14-day quarantine period from the COVID-19 household member.   In general, repeat testing is not necessary and not offered through our [facility].     This is the only note that will be provided from [facility] for this visit.  Your employee will require an appointment with a primary care provider if FMLA or disability  forms are required.  If you have any questions please do not hesitate to call me at the phone number listed below.    Sincerely,  JOSUE Espitia.-C.  [phone]

## 2020-12-24 DIAGNOSIS — Z20.822 EXPOSURE TO COVID-19 VIRUS: ICD-10-CM

## 2020-12-24 DIAGNOSIS — R52 GENERALIZED BODY ACHES: ICD-10-CM

## 2020-12-24 NOTE — PROGRESS NOTES
Subjective:   Johny Toledo is a 50 y.o. male who presents for Body Aches (started yesterday exposed to covid) and Head Ache      HPI  50 y.o. male presents to urgent care with new problem to provider of body aches, subjective fevers, chills, generalized fatigue, dizziness, generalized weakness and headaches onset yesterday.  Patient reports positive exposure to COVID-19 through coworkers.  He denies sick contacts at home.  Patient has been taking Tylenol with some relief.  He denies chest pain, cough, shortness of breath, or wheezing. Denies other associated aggravating or alleviating factors.       Review of Systems   Constitutional: Positive for chills, fever and malaise/fatigue.   HENT: Negative for congestion, ear pain, sinus pain and sore throat.    Eyes: Negative for discharge and redness.   Respiratory: Negative for cough, sputum production, shortness of breath and wheezing.    Cardiovascular: Negative for chest pain and palpitations.   Gastrointestinal: Negative for abdominal pain, diarrhea, nausea and vomiting.   Musculoskeletal: Positive for myalgias. Negative for neck pain.   Neurological: Positive for dizziness, weakness and headaches.   All other systems reviewed and are negative.      Patient Active Problem List   Diagnosis   • STEMI (ST elevation myocardial infarction) pk  trop 150 POBA LAD and DIAG med rx.   • Cardiomyopathy, nonischemic EF 15% no ascvd, query spasm vs. intracornary thrombus.   • Sleep apnea   • Narcolepsy with cataplexy   • HTN (hypertension)   • Abnormal electrocardiogram   • Chronic systolic congestive heart failure (HCC)   • Dizziness   • Male erectile disorder   • Mixed hyperlipidemia   • CAD (coronary artery disease)   • Apical mural thrombus following MI (HCC)     Past Surgical History:   Procedure Laterality Date   • ZZZ CARDIAC CATH  12/2010   • OTHER  tonsilectomy   • OTHER CARDIAC SURGERY      catherization 12/2010, 12/2008   • TONSILLECTOMY AND ADENOIDECTOMY    "    Social History     Tobacco Use   • Smoking status: Former Smoker     Packs/day: 0.50     Years: 15.00     Pack years: 7.50     Types: Cigarettes     Quit date: 12/2/2010     Years since quitting: 10.0   • Smokeless tobacco: Never Used   • Tobacco comment: 1/2 pack/day   Substance Use Topics   • Alcohol use: Not Currently     Alcohol/week: 0.0 oz     Comment: occasional   • Drug use: No      Family History   Problem Relation Age of Onset   • Sleep Apnea Neg Hx       (Allergies, Medications, & Tobacco/Substance Use were reconciled by the Medical Assistant and reviewed by myself. The family history is prepopulated)     Objective:     /82 (BP Location: Left arm, Patient Position: Sitting, BP Cuff Size: Adult)   Pulse (!) 122   Temp 36.9 °C (98.4 °F)   Resp 16   Ht 1.676 m (5' 6\")   Wt 74.5 kg (164 lb 3.2 oz)   SpO2 97%   BMI 26.50 kg/m²     Physical Exam  Constitutional:       General: He is not in acute distress.     Appearance: Normal appearance. He is not ill-appearing or diaphoretic.   HENT:      Head: Normocephalic and atraumatic.      Nose: Nose normal.      Mouth/Throat:      Mouth: Mucous membranes are dry.      Pharynx: Oropharynx is clear. Posterior oropharyngeal erythema present. No oropharyngeal exudate.   Eyes:      General: No scleral icterus.     Extraocular Movements: Extraocular movements intact.      Conjunctiva/sclera: Conjunctivae normal.      Pupils: Pupils are equal, round, and reactive to light.   Neck:      Musculoskeletal: Normal range of motion and neck supple.   Cardiovascular:      Rate and Rhythm: Regular rhythm. Tachycardia present.      Pulses: Normal pulses.      Heart sounds: Normal heart sounds.   Pulmonary:      Effort: Pulmonary effort is normal. No respiratory distress.      Breath sounds: Normal breath sounds. No wheezing, rhonchi or rales.   Musculoskeletal: Normal range of motion.   Lymphadenopathy:      Cervical: Cervical adenopathy present.   Skin:     General: " Skin is warm and dry.      Findings: No rash.   Neurological:      General: No focal deficit present.      Mental Status: He is alert and oriented to person, place, and time.      Cranial Nerves: No cranial nerve deficit.      Motor: No weakness.      Gait: Gait normal.   Psychiatric:         Mood and Affect: Mood normal.         Behavior: Behavior normal.         Thought Content: Thought content normal.         Judgment: Judgment normal.         Assessment/Plan:     1. Exposure to COVID-19 virus  COVID/SARS COV-2 PCR   2. Generalized body aches  COVID/SARS COV-2 PCR   3. History of cardiomyopathy  REFERRAL TO FOLLOW-UP WITH PRIMARY CARE   4. Dizziness  EKG - Clinic Performed     Recommend patient make follow up appointment with established cardiologist considering patient's history of STEMI, cardiomyopathy, heart failure and possible COVID-19 diagnosis.  Patient is not having any chest pain or shortness of breath currently.  He is in no acute distress.  EKG done in urgent care shows sinus tachycardia at rate of 107.  Nonspecific T abnormalities with no ST elevation or depression.  No concerning changes from last EKG reviewed and patient's medical records.     Patient instructed to self-isolate/quarantine per CDC guidelines.  I will follow-up pending COVID-19 testing. Discussed with patient may obtain hard copy of results on [facility].   Advised patient symptoms are most likely viral in etiology. Increased fluids and rest. Discussed use of OTC cough and cold medication and Tylenol/Motrin for symptomatic relief.  Return for reevaluation or proceed to ED if symptoms persist or worsen. Supportive care, differential diagnoses, and indications for immediate follow-up discussed with patient. Patient should to proceed to ED for development of symptoms including but not limited to shortness of breath breath, difficulty breathing, or worsening symptoms not manageable at home.   The patient demonstrated a good understanding and  agreed with the treatment plan and has no further questions regarding care.   Vital signs stable, patient in no acute respiratory distress.  Discussed with patient at length importance of communal effort to help decrease the infection rate of COVID 19. Patient advised to avoid large gatherings of people and practice good hand hygiene and respiratory precautions. COVID-19 discharge instructions and CDC guidelines provided to patient in AVS.      This patient is evaluated under [facility] isolation protocols in urgent care.  Out of an abundance of caution I am wearing a N95 mask, protective eye gear, gloves and gown through all interaction with patient.    Please note that this dictation was created using voice recognition software. I have made a reasonable attempt to correct obvious errors, but I expect that there are errors of grammar and possibly content that I did not discover before finalizing the note.    This note was electronically signed by Elvia Ayala PA-C

## 2020-12-25 LAB
COVID ORDER STATUS COVID19: NORMAL
SARS-COV-2 RNA RESP QL NAA+PROBE: DETECTED
SPECIMEN SOURCE: ABNORMAL

## 2021-01-15 DIAGNOSIS — G47.419 NARCOLEPSY WITHOUT CATAPLEXY: ICD-10-CM

## 2021-01-15 RX ORDER — DEXTROAMPHETAMINE SACCHARATE, AMPHETAMINE ASPARTATE, DEXTROAMPHETAMINE SULFATE AND AMPHETAMINE SULFATE 7.5; 7.5; 7.5; 7.5 MG/1; MG/1; MG/1; MG/1
30 TABLET ORAL 3 TIMES DAILY PRN
Qty: 90 TAB | Refills: 0 | Status: SHIPPED | OUTPATIENT
Start: 2021-01-15 | End: 2021-02-14

## 2021-01-15 NOTE — TELEPHONE ENCOUNTER
Have we ever prescribed this med? Yes.  If yes, what date? 10/16/20    Last OV: 10/16/20    Next OV: 5/28/21    DX: narcolepsy    Medications: adderall XR 30mg

## 2021-01-15 NOTE — TELEPHONE ENCOUNTER
Patient called in today and requested a refill on his Adderall. [facility] [address]

## 2021-02-10 DIAGNOSIS — G47.411 NARCOLEPSY WITH CATAPLEXY: ICD-10-CM

## 2021-02-10 NOTE — TELEPHONE ENCOUNTER
Patient called in today, stated that he had some questions about his medication prescribed from Dr. Sharp. The medicine he got was different from the last time. He stated that he likes/needs the XR type, not the instant type that was prescribed. [facility] [address]

## 2021-02-12 RX ORDER — DEXTROAMPHETAMINE SACCHARATE, AMPHETAMINE ASPARTATE MONOHYDRATE, DEXTROAMPHETAMINE SULFATE AND AMPHETAMINE SULFATE 7.5; 7.5; 7.5; 7.5 MG/1; MG/1; MG/1; MG/1
CAPSULE, EXTENDED RELEASE ORAL
Qty: 180 CAPSULE | Refills: 0 | Status: SHIPPED | OUTPATIENT
Start: 2021-02-12 | End: 2021-03-04 | Stop reason: SDUPTHER

## 2021-03-04 DIAGNOSIS — G47.411 NARCOLEPSY WITH CATAPLEXY: ICD-10-CM

## 2021-03-04 RX ORDER — DEXTROAMPHETAMINE SACCHARATE, AMPHETAMINE ASPARTATE MONOHYDRATE, DEXTROAMPHETAMINE SULFATE AND AMPHETAMINE SULFATE 7.5; 7.5; 7.5; 7.5 MG/1; MG/1; MG/1; MG/1
CAPSULE, EXTENDED RELEASE ORAL
Qty: 180 CAPSULE | Refills: 0 | Status: SHIPPED | OUTPATIENT
Start: 2021-03-04 | End: 2021-03-10 | Stop reason: SDUPTHER

## 2021-03-04 NOTE — TELEPHONE ENCOUNTER
Patient called in today and needs a refill on his medication. Dextroamphetamine XR. [facility] [address]

## 2021-03-04 NOTE — TELEPHONE ENCOUNTER
Have we ever prescribed this med? Yes.  If yes, what date? 02/12/2021    Last OV: 10/16/2020 - Dr. Sharp    Next OV: 05/28/2021 - Dr. Sharp    DX: Narcolepsy with Cataplexy    Medications: Adderall

## 2021-03-08 DIAGNOSIS — G47.411 NARCOLEPSY WITH CATAPLEXY: ICD-10-CM

## 2021-03-11 RX ORDER — DEXTROAMPHETAMINE SACCHARATE, AMPHETAMINE ASPARTATE MONOHYDRATE, DEXTROAMPHETAMINE SULFATE AND AMPHETAMINE SULFATE 7.5; 7.5; 7.5; 7.5 MG/1; MG/1; MG/1; MG/1
CAPSULE, EXTENDED RELEASE ORAL
Qty: 90 CAPSULE | Refills: 0 | Status: SHIPPED | OUTPATIENT
Start: 2021-03-11 | End: 2021-04-10

## 2021-05-28 ENCOUNTER — OFFICE VISIT (OUTPATIENT)
Dept: SLEEP MEDICINE | Facility: MEDICAL CENTER | Age: 51
End: 2021-05-28
Payer: COMMERCIAL

## 2021-05-28 VITALS
DIASTOLIC BLOOD PRESSURE: 78 MMHG | HEIGHT: 66 IN | OXYGEN SATURATION: 96 % | SYSTOLIC BLOOD PRESSURE: 130 MMHG | RESPIRATION RATE: 16 BRPM | WEIGHT: 167.3 LBS | HEART RATE: 81 BPM | BODY MASS INDEX: 26.89 KG/M2

## 2021-05-28 DIAGNOSIS — Z86.79 HISTORY OF CARDIOMYOPATHY: ICD-10-CM

## 2021-05-28 DIAGNOSIS — G47.411 NARCOLEPSY WITH CATAPLEXY: ICD-10-CM

## 2021-05-28 DIAGNOSIS — Z63.4 RECENT BEREAVEMENT: ICD-10-CM

## 2021-05-28 PROCEDURE — 99213 OFFICE O/P EST LOW 20 MIN: CPT | Performed by: INTERNAL MEDICINE

## 2021-05-28 RX ORDER — FUROSEMIDE 20 MG/1
20 TABLET ORAL DAILY
Status: ON HOLD | COMMUNITY
Start: 2021-03-09 | End: 2023-12-02

## 2021-05-28 RX ORDER — DEXTROAMPHETAMINE SACCHARATE, AMPHETAMINE ASPARTATE MONOHYDRATE, DEXTROAMPHETAMINE SULFATE AND AMPHETAMINE SULFATE 7.5; 7.5; 7.5; 7.5 MG/1; MG/1; MG/1; MG/1
CAPSULE, EXTENDED RELEASE ORAL
COMMUNITY
Start: 2021-05-09 | End: 2021-07-02 | Stop reason: SDUPTHER

## 2021-05-28 SDOH — SOCIAL STABILITY - SOCIAL INSECURITY: DISSAPEARANCE AND DEATH OF FAMILY MEMBER: Z63.4

## 2021-05-28 NOTE — PROGRESS NOTES
Chief Complaint   Patient presents with   • Follow-Up     6m FV, last seen 10/16/20 by Dr Sharp for Narcolepsy with cataplexy     HPI: This patient is a 50 y.o. Male  who returns for routine follow-up of narcolepsy with cataplexy. He uses Adderall XR 30 mg 2-3 tablets BID to TID. He works for [employer] and works long shifts (10-12 hr).  His mother passed away unexpectantly and he has been grieving and sleeping more than usual. Denies sleep attacks. Has noted mild worsening of cataplexy, however denies falls.  Denies adverse effects on Adderall however last script was for immediate release Adderall and he notes improved response to XR formulation.   He has a history of cardiomyopathy post-pacemaker and defibrillator, follows with Dr. Maharaj.  He saw heart transplant clinic in the past and has not required listing. Cardiac status had improved overall and he is pending Echo August 2021. Denies shortness of breath, edema or orthopnea.      Past Medical History:   Diagnosis Date   • Abnormal electrocardiogram 7/27/2011   • Bronchitis    • Chronic systolic congestive heart failure (HCC) 7/27/2011   • Congestive heart failure (HCC)    • Dizziness 3/31/2011   • Fall    • Heart valve disease    • Hyperlipidemia    • Hypertension    • Male erectile disorder 8/9/2011   • Mixed hyperlipidemia 8/9/2011   • Narcolepsy with cataplexy    • Pulmonary embolism (HCC)    • STEMI (ST elevation myocardial infarction) pk  trop 150 POBA LAD and DIAG med rx. 12/18/2010       Social History     Socioeconomic History   • Marital status:      Spouse name: Not on file   • Number of children: Not on file   • Years of education: Not on file   • Highest education level: Not on file   Occupational History   • Not on file   Tobacco Use   • Smoking status: Former Smoker     Packs/day: 0.50     Years: 15.00     Pack years: 7.50     Types: Cigarettes     Quit date: 12/2/2010     Years since quitting: 10.4   • Smokeless tobacco: Never Used   •  Tobacco comment: 1/2 pack/day   Vaping Use   • Vaping Use: Never used   Substance and Sexual Activity   • Alcohol use: Not Currently     Alcohol/week: 0.0 oz     Comment: occasional   • Drug use: No   • Sexual activity: Not on file   Other Topics Concern   • Not on file   Social History Narrative   • Not on file     Social Determinants of Health     Financial Resource Strain:    • Difficulty of Paying Living Expenses:    Food Insecurity:    • Worried About Running Out of Food in the Last Year:    • Ran Out of Food in the Last Year:    Transportation Needs:    • Lack of Transportation (Medical):    • Lack of Transportation (Non-Medical):    Physical Activity:    • Days of Exercise per Week:    • Minutes of Exercise per Session:    Stress:    • Feeling of Stress :    Social Connections:    • Frequency of Communication with Friends and Family:    • Frequency of Social Gatherings with Friends and Family:    • Attends Zoroastrian Services:    • Active Member of Clubs or Organizations:    • Attends Club or Organization Meetings:    • Marital Status:    Intimate Partner Violence:    • Fear of Current or Ex-Partner:    • Emotionally Abused:    • Physically Abused:    • Sexually Abused:        Family History   Problem Relation Age of Onset   • Sleep Apnea Neg Hx        Current Outpatient Medications on File Prior to Visit   Medication Sig Dispense Refill   • amphetamine-dextroamphetamine ER (ADDERALL XR) 30 MG XR capsule TAKE 2 TO 3 CAPSULES BY MOUTH UP TO 2 TO 3 TIMES DAILY AS NEEDED     • furosemide (LASIX) 20 MG Tab Take 20 mg by mouth every day.     • atorvastatin (LIPITOR) 40 MG Tab Take 40 mg by mouth.     • ENTRESTO 49-51 MG Tab tablet TAKE 1 TABLET BY MOUTH TWICE A DAY**PA  3   • warfarin (COUMADIN) 7.5 MG Tab TAKE 1 & 1/2 TABLETS BY MOUTH ONCE DAILY 45 Tab 6   • carvedilol (COREG) 25 MG Tab Take 25 mg by mouth 2 times a day, with meals.     • amlodipine (NORVASC) 10 MG TABS TAKE 1 TABLET BY MOUTH EVERY DAY 30 Tab 6  "  • aspirin EC (ECOTRIN) 81 MG TBEC Take 1 Tab by mouth every day. 30 Each 11   • zolpidem (AMBIEN CR) 12.5 MG CR tablet Take 1 Tab by mouth at bedtime as needed for Sleep (insomnia). (Patient not taking: Reported on 5/22/2019) 30 Tab 3   • enalapril (VASOTEC) 20 MG tablet TAKE 1 TABLET BY MOUTH TWICE A DAY (Patient not taking: Reported on 2/8/2019) 60 Tab 3     No current facility-administered medications on file prior to visit.       Allergies: Patient has no known allergies.    ROS:   Constitutional: Denies fevers, chills, night sweats, fatigue or weight loss  Eyes: Denies vision loss, pain, drainage, double vision  Ears, Nose, Throat: Denies earache, difficulty hearing, tinnitus, nasal congestion, hoarseness  Cardiovascular: Denies chest pain, tightness, palpitations, orthopnea or edema  Respiratory: Denies shortness of breath, cough, wheezing, hemoptysis  Sleep:As in HPI  GI: Denies heartburn, dysphagia, nausea, abdominal pain, diarrhea or constipation  : Denies frequent urination, hematuria, discharge or painful urination  Musculoskeletal: Denies back pain, painful joints, sore muscles  Neurological: Denies weakness or headaches  Skin: No rashes    /78   Pulse 81   Resp 16   Ht 1.676 m (5' 6\")   Wt 75.9 kg (167 lb 4.8 oz)   SpO2 96%     Physical Exam:  Appearance: Well-nourished, well-developed, in no acute distress  HEENT: Normocephalic, atraumatic, white sclera, PERRLA, masked  Neck: No adenopathy or masses  Respiratory: no intercostal retractions or accessory muscle use  Lungs auscultation: Clear to auscultation bilaterally  Cardiovascular: Regular rate rhythm. No murmurs, rubs or gallops.  No LE edema  Abdomen: soft, nondistended  Gait: Normal  Digits: No clubbing, cyanosis  Motor: No focal deficits  Orientation: Oriented to time, person and place    Diagnosis:  1. Narcolepsy with cataplexy     2. Recent bereavement     3. History of cardiomyopathy         Plan:  Johny has narcolepsy with " cataplexy, clinically stable although cataplexy had mildly worsened with recent bereavement.   Encouraged to attain minimal 7 hours of sleep nightly, with power naps in the day PRN when able.  Continue Adderall XR 30 mg 2-3 tabs BID to TID #180 tabs. We will verify Adderall is XR tabs, NOT immediate release.  Was offerred referral to behavioural health for grief counseling- he has spoken with a psychologist thru Hospice, and declined referral. Denies suicidal ideation. He has returned to work and appears to be coping appropriately.  Follow up with cardiology for cardiomyopathy. Will request Echo results.  Return in about 1 year (around 5/28/2022).

## 2021-06-11 NOTE — TELEPHONE ENCOUNTER
Patient has appointment with Dr Sharp on 2/8/19 but is calling today for RX of Adderall 90 day supply. Please write and or advise.   [facility] 

## 2021-07-02 DIAGNOSIS — G47.411 NARCOLEPSY WITH CATAPLEXY: ICD-10-CM

## 2021-07-02 DIAGNOSIS — G47.419 NARCOLEPSY WITHOUT CATAPLEXY: ICD-10-CM

## 2021-07-02 RX ORDER — DEXTROAMPHETAMINE SACCHARATE, AMPHETAMINE ASPARTATE MONOHYDRATE, DEXTROAMPHETAMINE SULFATE AND AMPHETAMINE SULFATE 7.5; 7.5; 7.5; 7.5 MG/1; MG/1; MG/1; MG/1
CAPSULE, EXTENDED RELEASE ORAL
Qty: 180 CAPSULE | Refills: 0 | Status: SHIPPED | OUTPATIENT
Start: 2021-07-02 | End: 2021-08-02 | Stop reason: SDUPTHER

## 2021-07-02 NOTE — TELEPHONE ENCOUNTER
Have we ever prescribed this med? Yes.  If yes, what date? 5/9/21    Last OV: 5/28/21    Next OV: no pending    DX: narcolepsy with cataplexy    Medications: Adderrall

## 2021-08-02 DIAGNOSIS — G47.411 NARCOLEPSY WITH CATAPLEXY: ICD-10-CM

## 2021-08-02 NOTE — TELEPHONE ENCOUNTER
Received request via: Patient    Was the patient seen in the last year in this department? Yes  5/28/21  Does the patient have an active prescription (recently filled or refills available) for medication(s) requested? No

## 2021-08-03 RX ORDER — DEXTROAMPHETAMINE SACCHARATE, AMPHETAMINE ASPARTATE MONOHYDRATE, DEXTROAMPHETAMINE SULFATE AND AMPHETAMINE SULFATE 7.5; 7.5; 7.5; 7.5 MG/1; MG/1; MG/1; MG/1
CAPSULE, EXTENDED RELEASE ORAL
Qty: 180 CAPSULE | Refills: 0 | Status: SHIPPED | OUTPATIENT
Start: 2021-08-03 | End: 2021-09-08 | Stop reason: SDUPTHER

## 2021-09-08 ENCOUNTER — TELEPHONE (OUTPATIENT)
Dept: SLEEP MEDICINE | Facility: MEDICAL CENTER | Age: 51
End: 2021-09-08

## 2021-09-08 DIAGNOSIS — G47.419 NARCOLEPSY WITHOUT CATAPLEXY: ICD-10-CM

## 2021-09-08 DIAGNOSIS — G47.411 NARCOLEPSY WITH CATAPLEXY: ICD-10-CM

## 2021-09-08 RX ORDER — DEXTROAMPHETAMINE SACCHARATE, AMPHETAMINE ASPARTATE MONOHYDRATE, DEXTROAMPHETAMINE SULFATE AND AMPHETAMINE SULFATE 7.5; 7.5; 7.5; 7.5 MG/1; MG/1; MG/1; MG/1
CAPSULE, EXTENDED RELEASE ORAL
Qty: 180 CAPSULE | Refills: 0 | Status: SHIPPED | OUTPATIENT
Start: 2021-09-08 | End: 2021-09-10 | Stop reason: SDUPTHER

## 2021-09-08 NOTE — TELEPHONE ENCOUNTER
PT called for a refill of Adderall XR 30mg 180 count sent to [facility] on [address] in [place].

## 2021-09-08 NOTE — TELEPHONE ENCOUNTER
Patient needs sleep visit with SLEEP MD only for review of his narcolepsy and med use since Dr. Sharp no longer here. Please scheduled for appt ASAP.  Refill given for time being.

## 2021-09-08 NOTE — TELEPHONE ENCOUNTER
Have we ever prescribed this med? Yes.  If yes, what date? 8/2/21    Last OV: 5/28/21    Next OV: no pending    DX: narcolepsy with cataplexy    Medications: adderall 30mg 2-3 tablets 2-3 times daily #180

## 2021-09-08 NOTE — TELEPHONE ENCOUNTER
I called patient to let him know Rx was refilled today. Appointment needed prior to next refill. I mailed the list of our sleep physicians names to patient with [facility]'s phone number so patient can get scheduled.

## 2021-09-10 DIAGNOSIS — G47.411 NARCOLEPSY WITH CATAPLEXY: ICD-10-CM

## 2021-09-10 RX ORDER — DEXTROAMPHETAMINE SACCHARATE, AMPHETAMINE ASPARTATE MONOHYDRATE, DEXTROAMPHETAMINE SULFATE AND AMPHETAMINE SULFATE 7.5; 7.5; 7.5; 7.5 MG/1; MG/1; MG/1; MG/1
CAPSULE, EXTENDED RELEASE ORAL
Qty: 180 CAPSULE | Refills: 0 | Status: SHIPPED | OUTPATIENT
Start: 2021-09-10 | End: 2021-10-03 | Stop reason: SDUPTHER

## 2021-09-10 NOTE — TELEPHONE ENCOUNTER
Pt called states he had gone to [facility] for his amphetamine-dextroamphetamine ER (ADDERALL XR) 30 MG XR capsule. They are requesting a day quality to provide the patient with RX. I attempted to reach out to [facility] no answer. Please place an updated prescription with day supply.     Last seen 5/28/21 Dr. Sharp   RX sent by cayla Hawkins 9/8/21  8/3/21   amphetamine-dextroamphetamine ER (ADDERALL XR) 30 MG XR capsule [[id number]]     Order Details  Dose, Route, Frequency: As Directed   Dispense Quantity: 180 Capsule Refills: 0          Sig: TAKE 2 TO 3 CAPSULES BY MOUTH UP TO 2 TO 3 TIMES DAILY AS NEEDED

## 2021-09-10 NOTE — TELEPHONE ENCOUNTER
I spoke [facility] they stated they don't have enough medication to fill the quality. Informed she contact patient and inform with correct information. Patient can wait till get rest of medication or have medication sent to another pharmacy.

## 2021-09-10 NOTE — TELEPHONE ENCOUNTER
Prescription for Adderall extended release 30 mg twice daily to 3 times daily is filled today however the patient needs to follow-up with an MD to further review as this is a high dose.  Patient will be scheduled with Dr. Lance on 10/1/2021.    ERIC Fernandez.

## 2021-09-10 NOTE — TELEPHONE ENCOUNTER
Patient called states he received a call from [facility] states he is unable to  medication till 10/5/21. Informed new prescription was sent today. Patient scheduled for follow up with Dr. Lance 10/10/21. Informed I will need to contact [facility]

## 2021-10-03 DIAGNOSIS — G47.411 NARCOLEPSY WITH CATAPLEXY: ICD-10-CM

## 2021-10-08 ENCOUNTER — OFFICE VISIT (OUTPATIENT)
Dept: SLEEP MEDICINE | Facility: MEDICAL CENTER | Age: 51
End: 2021-10-08
Payer: COMMERCIAL

## 2021-10-08 VITALS
OXYGEN SATURATION: 95 % | WEIGHT: 167 LBS | SYSTOLIC BLOOD PRESSURE: 146 MMHG | HEART RATE: 101 BPM | DIASTOLIC BLOOD PRESSURE: 90 MMHG | HEIGHT: 66 IN | RESPIRATION RATE: 16 BRPM | BODY MASS INDEX: 26.84 KG/M2

## 2021-10-08 DIAGNOSIS — G47.411 NARCOLEPSY WITH CATAPLEXY: ICD-10-CM

## 2021-10-08 PROCEDURE — 99214 OFFICE O/P EST MOD 30 MIN: CPT | Performed by: FAMILY MEDICINE

## 2021-10-08 RX ORDER — EZETIMIBE 10 MG/1
10 TABLET ORAL DAILY
COMMUNITY
Start: 2021-09-08

## 2021-10-08 RX ORDER — SOLRIAMFETOL 150 MG/1
150 TABLET, FILM COATED ORAL EVERY MORNING
Qty: 30 TABLET | Refills: 0 | COMMUNITY
Start: 2021-10-08 | End: 2021-11-07

## 2021-10-08 NOTE — TELEPHONE ENCOUNTER
Have we ever prescribed this med? Yes.  If yes, what date? 09/10/2021    Last OV: 05/28/2021 - Dr. Sharp    Next OV: 10/08/2021 - Dr. Lance     DX: Narcolepsy with cataplexy    Medications: Adderall

## 2021-10-08 NOTE — PATIENT INSTRUCTIONS
Solriamfetol tablets  What is this medicine?  SOLRIAMFETOL (sol ri AM fe akiko) is used to treat excessive sleepiness caused by certain sleep disorders including narcolepsy and obstructive sleep apnea.  This medicine may be used for other purposes; ask your health care provider or pharmacist if you have questions.  COMMON BRAND NAME(S): DARCY  What should I tell my health care provider before I take this medicine?  They need to know if you have any of these conditions:  · bipolar disorder  · diabetes  · heart disease  · high blood pressure  · high cholesterol  · history of drug abuse or alcohol abuse problem  · history of stroke  · kidney disease  · schizophrenia  · an unusual or allergic reaction to solriamfetol, other medicines, foods, dyes, or preservatives  · pregnant or trying to get pregnant  · breast-feeding  How should I use this medicine?  Take this medicine by mouth with a glass of water when you first wake up. Do not take it within 9 hours of your planned bedtime. Follow the directions on the prescription label. You can take it with or without food. If it upsets your stomach, take it with food. Take your medicine at regular intervals. Do not take it more often than directed. Do not stop taking except on your doctor's advice.  A special MedGuide will be given to you by the pharmacist with each prescription and refill. Be sure to read this information carefully each time.  Talk to your pediatrician regarding the use of this medicine in children. Special care may be needed.  Overdosage: If you think you have taken too much of this medicine contact a poison control center or emergency room at once.  NOTE: This medicine is only for you. Do not share this medicine with others.  What if I miss a dose?  If you miss a dose, take it as soon as you can. However, avoid taking it within 9 hours of your planned bedtime, since you may find it harder to go to sleep. If it is almost time for your next dose, take only that  dose. Do not take double or extra doses.  What may interact with this medicine?  Do not take this medicine with any of the following medications:  · MAOIs like Carbex, Eldepryl, Marplan, Nardil, and Parnate  This medicine may also interact with the following medications:  · certain medicines for Parkinson's disease like levodopa, pramipexole, or ropinirole  · medicines that increase blood pressure or heart rate  This list may not describe all possible interactions. Give your health care provider a list of all the medicines, herbs, non-prescription drugs, or dietary supplements you use. Also tell them if you smoke, drink alcohol, or use illegal drugs. Some items may interact with your medicine.  What should I watch for while using this medicine?  Visit your healthcare professional for regular checks on your progress. Tell your healthcare professional if your symptoms do not start to get better or if they get worse.  This medicine has a risk of abuse and dependence. Your healthcare provider will check you for this while you take this medicine.  What side effects may I notice from receiving this medicine?  Side effects that you should report to your doctor or health care professional as soon as possible:  · allergic reactions like skin rash, itching, and hives; swelling of the face, lips, or tongue  · anxiety  · changes in emotions or moods  · elevated mood, decreased need for sleep, racing thoughts, impulsive behavior  · fast heartbeat  · hallucinations, loss of contact with reality  · irritable  · signs and symptoms of a dangerous increase in blood pressure like chest pain; shortness of breath; sudden severe headache; vision disturbances; seizures; decreased consciousness  · signs and symptoms of a stroke like changes in vision; confusion; trouble speaking or understanding; severe headaches; sudden numbness or weakness of the face, arm or leg; trouble walking; dizziness; loss of balance or  coordination  · vomiting  Side effects that usually do not require medical attention (report these to your doctor or health care professional if they continue or are bothersome):  · decreased appetite  · dry mouth  · increased sweating  · nausea  · trouble sleeping  This list may not describe all possible side effects. Call your doctor for medical advice about side effects. You may report side effects to FDA at 7-030-MDE-2731.  Where should I keep my medicine?  Keep out of the reach of children. This medicine can be abused. Keep your medicine in a safe place to protect it from theft. Do not share this medicine with anyone. Selling or giving away this medicine is dangerous and is against the law.  Store at room temperature between 15 and 30 degrees C (59 and 86 degrees F).  This medicine may cause harm and death if it is taken by other adults, children, or pets. Return medicine that has not been used to an official disposal site. Contact the [facility] at 1-453.522.8993 or your [facility] government to find a site. If you cannot return the medicine, mix any unused medicine with a substance like cat litter or coffee grounds. Then throw the medicine away in a sealed container like a sealed bag or coffee can with a lid. Do not use the medicine after the expiration date.  NOTE: This sheet is a summary. It may not cover all possible information. If you have questions about this medicine, talk to your doctor, pharmacist, or health care provider.  © 2020 [employer] (2019-10-03 12:40:49)

## 2021-10-11 RX ORDER — DEXTROAMPHETAMINE SACCHARATE, AMPHETAMINE ASPARTATE MONOHYDRATE, DEXTROAMPHETAMINE SULFATE AND AMPHETAMINE SULFATE 7.5; 7.5; 7.5; 7.5 MG/1; MG/1; MG/1; MG/1
30 CAPSULE, EXTENDED RELEASE ORAL EVERY MORNING
Qty: 30 CAPSULE | Refills: 0 | Status: SHIPPED | OUTPATIENT
Start: 2021-10-11 | End: 2021-11-05 | Stop reason: SDUPTHER

## 2021-10-12 ENCOUNTER — TELEPHONE (OUTPATIENT)
Dept: SLEEP MEDICINE | Facility: MEDICAL CENTER | Age: 51
End: 2021-10-12

## 2021-10-12 NOTE — TELEPHONE ENCOUNTER
Patient called and stated that the pharmacy only had 30 Adderall tablets in his prescription and he was suppose to have received 120 tablets.

## 2021-10-13 ENCOUNTER — PATIENT MESSAGE (OUTPATIENT)
Dept: SLEEP MEDICINE | Facility: MEDICAL CENTER | Age: 51
End: 2021-10-13

## 2021-10-13 NOTE — TELEPHONE ENCOUNTER
Patient was Seen on 10/08/21 Dr. Lance    amphetamine-dextroamphetamine ER (ADDERALL XR) 30 MG XR capsule 30 Capsule 0/0 10/11/2021 11/10/2021    Sig - Route: Take 1 Capsule by mouth every morning for 30 days. - Oral    Sent to pharmacy as: Amphetamine-Dextroamphet ER 30 MG Oral Capsule Extended Release 24 Hour (ADDERALL XR)    Earliest Fill Date: 10/11/2021    E-Prescribing Status: Receipt confirmed by pharmacy (10/11/2021  4:24 PM PDT)          RX was previously approved from 09/10/21-10/03/21 By GRAEME STEWARD   amphetamine-dextroamphetamine ER (ADDERALL XR) 30 MG XR capsule (Discontinued) 180 Capsule 0/0 9/10/2021 10/3/2021    Sig: TAKE 2 TO 3 CAPSULES BY MOUTH UP TO 2 TO 3 TIMES DAILY AS NEEDED 30 day supply    Sent to pharmacy as: Amphetamine-Dextroamphet ER 30 MG Oral Capsule Extended Release 24 Hour (ADDERALL XR)    Earliest Fill Date: 9/10/2021    Reason for Discontinue: Reorder    E-Prescribing Status: Receipt confirmed by pharmacy (9/10/2021 10:52 AM PDT)

## 2021-10-13 NOTE — TELEPHONE ENCOUNTER
We had a great discussion on decreasing the dose.  He is started on Sunosi as a trial and he can take 1 of Adderall 30 ER if needed for the next 1 month.  We will regroup and discuss further weaning process no need to refill further for the next month

## 2021-10-14 NOTE — PATIENT COMMUNICATION
"Received email from Contact Center that patient was trying to call the office regarding his medication change.  After clarifying the plan with Dr. Lance, called pt back and explained that it will take more than 3 days to determine if this new medication will help.  Pt expressed that he is having difficulty staying awake and he \"doesn't feel anything\" when he takes the new medicine.  I asked the patient to try this plan for 2 weeks and then to reach back out to Dr. Lance if he still felt there was no improvement.  Pt understood.  "

## 2021-10-14 NOTE — TELEPHONE ENCOUNTER
sent [employer] message  Yesterday to the pt regarding the prescription. Per Dr. Natalio Mcclain,  I think we might have misunderstood each other when I was explaining our plan.  I thought we agreed upon that you will use Sunosi for the next 1 month.  Being off with half a tablet for the first 3 days and then increasing it to a full tablet for the rest of the month.  Also if needed, 1 tablet of Adderall 30 mg extended release.     Raulito Lance M.D.

## 2021-11-05 ENCOUNTER — OFFICE VISIT (OUTPATIENT)
Dept: SLEEP MEDICINE | Facility: MEDICAL CENTER | Age: 51
End: 2021-11-05
Payer: COMMERCIAL

## 2021-11-05 VITALS
OXYGEN SATURATION: 96 % | BODY MASS INDEX: 26.84 KG/M2 | DIASTOLIC BLOOD PRESSURE: 92 MMHG | SYSTOLIC BLOOD PRESSURE: 138 MMHG | HEART RATE: 93 BPM | WEIGHT: 167 LBS | RESPIRATION RATE: 16 BRPM | HEIGHT: 66 IN

## 2021-11-05 DIAGNOSIS — G47.411 NARCOLEPSY WITH CATAPLEXY: ICD-10-CM

## 2021-11-05 DIAGNOSIS — G47.33 OBSTRUCTIVE SLEEP APNEA SYNDROME: ICD-10-CM

## 2021-11-05 PROCEDURE — 99214 OFFICE O/P EST MOD 30 MIN: CPT | Performed by: FAMILY MEDICINE

## 2021-11-05 RX ORDER — DEXTROAMPHETAMINE SACCHARATE, AMPHETAMINE ASPARTATE MONOHYDRATE, DEXTROAMPHETAMINE SULFATE AND AMPHETAMINE SULFATE 7.5; 7.5; 7.5; 7.5 MG/1; MG/1; MG/1; MG/1
30 CAPSULE, EXTENDED RELEASE ORAL 2 TIMES DAILY PRN
Qty: 60 CAPSULE | Refills: 0 | Status: SHIPPED | OUTPATIENT
Start: 2021-11-05 | End: 2021-12-05

## 2021-11-05 RX ORDER — ARMODAFINIL 150 MG/1
150 TABLET ORAL EVERY MORNING
Qty: 30 TABLET | Refills: 0 | Status: SHIPPED | OUTPATIENT
Start: 2021-11-05 | End: 2021-12-05

## 2021-11-05 NOTE — LETTER
"   Raulito Lance M.D.  [facility]   [address], [address] [place] [address]  Phone: [phone] - Fax:             Encounter Date: 11/5/2021  Provider: Raulito Lance M.D.  Location of Care: [facility]  [address] [facility] [address]      Patient:   Johny Toledo   MR Number: [id number]   YOB: 1970     PROGRESS NOTE:    [facility] Follow Up Note     Date: 11/5/2021 / Time: 3:49 PM    Patient ID:   Name:             Johny Toledo   YOB: 1970  Age:                 51 y.o.  male   MRN:               [id number]      Thank you for requesting a sleep medicine consultation on Johny Toledo at the sleep center. He presents today with the chief complaints of narcolepsy with cataplexy follow up.     HISTORY OF PRESENT ILLNESS:       He works 10-15 hrs per day. He goes to work around 10 am and gets home at 10-11 pm.He goes to sleep around 1 am and wakes up around 7:30 am. He is getting about 8-10 hrs of sleep on a good night and about 6 hr of sleep on a bad night. The bad nights are on work nights per week. He occasionally**** doesnot finds his sleep refreshing.He occasionally takes a naps. The naps are usually 15-20 min long. Overall, he doesnot finds his sleep refreshing.He denies any symptoms of RLS,or any symptoms to suggest parasomnias such as nightmares, sleep walking or acting out of dreams.      He is a previous patient of Dr. Koehler and [facility].  He was diagnosed with narcolepsy with cataplexy on 7/10/2006 via MSLT.  We do not have access to his PSG prior to the MSLT.  Based on the MSLT the sleep latency was 13.5 minutes and REM latency was 4.8.  He had SOREMPS on his first and second nap. his initial symptoms were excessive daytime sleepiness and symptoms of cataplexy. His cataplexy symptoms include \"extremely tiered\" or \"dayjavu\". The symptoms of cataplexy are infrequent. " He takes Sunosi 150 mg at 8:30. As per pt he does not feel any benefit from the sunosi. He was taking multiple adderall xr 30 per day and ran out in few week. He has tried nuvigil in past and unsure if it was help in past.     REVIEW OF SYSTEMS:       Constitutional: Denies fevers, Denies weight changes  Eyes: Denies changes in vision, no eye pain  Ears/Nose/Throat/Mouth: Denies nasal congestion or sore throat   Cardiovascular: Denies chest pain or palpitations   Respiratory: Denies shortness of breath , Denies cough  Gastrointestinal/Hepatic: Denies abdominal pain, nausea, vomiting  Musculoskeletal/Rheum: Denies  joint pain and swelling   Skin/Breast: Denies rash,   Neurological: Denies headache, confusion, memory loss or focal weakness/parasthesias  Psychiatric: denies mood disorder   Sleep: ***    Comprehensive review of systems form is reviewed with the patient and is attached in the EMR.     PMH:  has a past medical history of Abnormal electrocardiogram (7/27/2011), Bronchitis, Chronic systolic congestive heart failure (HCC) (7/27/2011), Congestive heart failure (HCC), Dizziness (3/31/2011), Fall, Heart valve disease, Hyperlipidemia, Hypertension, Male erectile disorder (8/9/2011), Mixed hyperlipidemia (8/9/2011), Narcolepsy with cataplexy, Pulmonary embolism ([facility]), and STEMI (ST elevation myocardial infarction) pk  trop 150 POBA LAD and DIAG med rx. (12/18/2010).  MEDS:   Current Outpatient Medications:   •  amphetamine-dextroamphetamine ER (ADDERALL XR) 30 MG XR capsule, Take 1 Capsule by mouth every morning for 30 days., Disp: 30 Capsule, Rfl: 0  •  ezetimibe (ZETIA) 10 MG Tab, Take 10 mg by mouth every day., Disp: , Rfl:   •  Solriamfetol HCl (SUNOSI) 150 MG Tab, Take 150 mg by mouth every morning for 30 days., Disp: 30 Tablet, Rfl: 0  •  furosemide (LASIX) 20 MG Tab, Take 20 mg by mouth every day., Disp: , Rfl:   •  atorvastatin (LIPITOR) 40 MG Tab, Take 40 mg by mouth., Disp: , Rfl:   •  warfarin  "(COUMADIN) 7.5 MG Tab, TAKE 1 & 1/2 TABLETS BY MOUTH ONCE DAILY, Disp: 45 Tab, Rfl: 6  •  carvedilol (COREG) 25 MG Tab, Take 25 mg by mouth 2 times a day, with meals., Disp: , Rfl:   •  amlodipine (NORVASC) 10 MG TABS, TAKE 1 TABLET BY MOUTH EVERY DAY, Disp: 30 Tab, Rfl: 6  •  aspirin EC (ECOTRIN) 81 MG TBEC, Take 1 Tab by mouth every day., Disp: 30 Each, Rfl: 11  •  ENTRESTO 49-51 MG Tab tablet, TAKE 1 TABLET BY MOUTH TWICE A DAY**PA, Disp: , Rfl: 3  •  zolpidem (AMBIEN CR) 12.5 MG CR tablet, Take 1 Tab by mouth at bedtime as needed for Sleep (insomnia). (Patient not taking: Reported on 5/22/2019), Disp: 30 Tab, Rfl: 3  •  enalapril (VASOTEC) 20 MG tablet, TAKE 1 TABLET BY MOUTH TWICE A DAY (Patient not taking: Reported on 2/8/2019), Disp: 60 Tab, Rfl: 3  ALLERGIES: No Known Allergies  SURGHX:   Past Surgical History:   Procedure Laterality Date   • ZZZ CARDIAC CATH  12/2010   • OTHER  tonsilectomy   • OTHER CARDIAC SURGERY      catherization 12/2010, 12/2008   • TONSILLECTOMY AND ADENOIDECTOMY       SOCHX:  reports that he quit smoking about 10 years ago. His smoking use included cigarettes. He has a 7.50 pack-year smoking history. He has never used smokeless tobacco. He reports previous alcohol use. He reports that he does not use drugs..  FH:   Family History   Problem Relation Age of Onset   • Sleep Apnea Neg Hx          Physical Exam:  Vitals/ General Appearance:   Weight/BMI: Body mass index is 26.95 kg/m².  /92 (BP Location: Left arm, Patient Position: Sitting, BP Cuff Size: Adult)   Pulse 93   Resp 16   Ht 1.676 m (5' 6\")   Wt 75.8 kg (167 lb)   SpO2 96%   Vitals:    11/05/21 1536   BP: 138/92   BP Location: Left arm   Patient Position: Sitting   BP Cuff Size: Adult   Pulse: 93   Resp: 16   SpO2: 96%   Weight: 75.8 kg (167 lb)   Height: 1.676 m (5' 6\")       Pt. is alert and oriented to time, place and person. Cooperative and in no apparent distress.       Constitutional: Alert, no distress, " well-groomed.  Skin: No rashes in visible areas.  Eye: Round. Conjunctiva clear, lids normal. No icterus.   ENMT: Lips pink without lesions, good dentition, moist mucous membranes. Phonation normal.  Neck: No masses, no thyromegaly. Moves freely without pain.  CV: Pulse as reported by patient  Respiratory: Unlabored respiratory effort, no cough or audible wheeze  Psych: Alert and oriented x3, normal affect and mood.     ASSESSMENT AND PLAN     1.*** Sleep Apnea .He  Is currently on CPAP*** BiPAP*** of *** with *** mask. 30 day compliance was downloaded which shows ***adequate compliance.     The pathophysiology of sleep anea and the increased risk of cardiovascular morbidity from untreated sleep apnea is discussed in detail with the patient. He  also has HTN***, heart disease*** which can be worsened by her MAO.     He is urged to avoid supine sleep, weight gain and alcoholic beverages since all of these can worsen sleep apnea. He is cautioned against drowsy driving. If He feels sleepy while driving, He must pull over for a break/nap, rather than persist on the road, in the interest of He own safety and that of others on the road.   Plan   - Continue *** at *** cm with *** mask    - Auto CPAP vs overnight CPAP titration vs dental appliance and surgeries was dicussed in detail. After informed discussion ***   - F/u in 8-10 weeks to assess the efficiacy of recommended pressure    - compliance download was reviewed and discussed with the pt   - compliance was reinforced     2. Regarding treatment of other past medical problems and general health maintenance,  He is urged to follow up with PCP.          Electronically signed by Raulito Lance M.D.  on 11/05/21      No Recipients

## 2021-11-05 NOTE — PROGRESS NOTES
"  [facility] Follow Up Note     Date: 11/5/2021 / Time: 3:49 PM    Patient ID:   Name:             Johny Toledo   YOB: 1970  Age:                 51 y.o.  male   MRN:               [id number]      Thank you for requesting a sleep medicine consultation on Johny Toledo at the sleep center. He presents today with the chief complaints of narcolepsy with cataplexy follow up.     HISTORY OF PRESENT ILLNESS:       He works 10-15 hrs per day. He goes to work around 10 am and gets home at 10-11 pm.He goes to sleep around 1 am and wakes up around 7:30 am. He is getting about 8-10 hrs of sleep on a good night and about 6 hr of sleep on a bad night. The bad nights are on work nights per week. He  doesnot finds his sleep refreshing and takes a naps. The naps are usually 15-20 min long. Overall, he doesnot finds his sleep refreshing.He denies any symptoms of RLS,or any symptoms to suggest parasomnias such as nightmares, sleep walking or acting out of dreams.      He is a previous patient of Dr. Koehler and [facility].  He was diagnosed with narcolepsy with cataplexy on 7/10/2006 via MSLT.  We do not have access to his PSG prior to the MSLT.  Based on the MSLT the sleep latency was 13.5 minutes and REM latency was 4.8.  He had SOREMPS on his first and second nap. his initial symptoms were excessive daytime sleepiness and symptoms of cataplexy. His cataplexy symptoms include \"extremely tiered\" or \"dayjavu\". The symptoms of cataplexy are infrequent. He took Sunosi 150 mg at 8:30. As per pt he did not feel any benefit from the sunosi. He was taking multiple adderall xr 30 per day and ran out in few week. He has tried nuvigil in past and unsure if it was help in past. EPWORTH is 15/24.     REVIEW OF SYSTEMS:       Constitutional: Denies fevers, Denies weight changes  Eyes: Denies changes in vision, no eye pain  Ears/Nose/Throat/Mouth: Denies nasal congestion or sore throat   Cardiovascular: Denies chest " pain or palpitations   Respiratory: Denies shortness of breath , Denies cough  Gastrointestinal/Hepatic: Denies abdominal pain, nausea, vomiting  Musculoskeletal/Rheum: Denies  joint pain and swelling   Skin/Breast: Denies rash,   Neurological: Denies headache, confusion, memory loss or focal weakness/parasthesias  Psychiatric: denies mood disorder   Sleep: +EDS    Comprehensive review of systems form is reviewed with the patient and is attached in the EMR.     PMH:  has a past medical history of Abnormal electrocardiogram (7/27/2011), Bronchitis, Chronic systolic congestive heart failure (HCC) (7/27/2011), Congestive heart failure (HCC), Dizziness (3/31/2011), Fall, Heart valve disease, Hyperlipidemia, Hypertension, Male erectile disorder (8/9/2011), Mixed hyperlipidemia (8/9/2011), Narcolepsy with cataplexy, Pulmonary embolism (HCC), and STEMI (ST elevation myocardial infarction) pk  trop 150 POBA LAD and DIAG med rx. (12/18/2010).  MEDS:   Current Outpatient Medications:   •  amphetamine-dextroamphetamine ER (ADDERALL XR) 30 MG XR capsule, Take 1 Capsule by mouth every morning for 30 days., Disp: 30 Capsule, Rfl: 0  •  ezetimibe (ZETIA) 10 MG Tab, Take 10 mg by mouth every day., Disp: , Rfl:   •  Solriamfetol HCl (SUNOSI) 150 MG Tab, Take 150 mg by mouth every morning for 30 days., Disp: 30 Tablet, Rfl: 0  •  furosemide (LASIX) 20 MG Tab, Take 20 mg by mouth every day., Disp: , Rfl:   •  atorvastatin (LIPITOR) 40 MG Tab, Take 40 mg by mouth., Disp: , Rfl:   •  warfarin (COUMADIN) 7.5 MG Tab, TAKE 1 & 1/2 TABLETS BY MOUTH ONCE DAILY, Disp: 45 Tab, Rfl: 6  •  carvedilol (COREG) 25 MG Tab, Take 25 mg by mouth 2 times a day, with meals., Disp: , Rfl:   •  amlodipine (NORVASC) 10 MG TABS, TAKE 1 TABLET BY MOUTH EVERY DAY, Disp: 30 Tab, Rfl: 6  •  aspirin EC (ECOTRIN) 81 MG TBEC, Take 1 Tab by mouth every day., Disp: 30 Each, Rfl: 11  •  ENTRESTO 49-51 MG Tab tablet, TAKE 1 TABLET BY MOUTH TWICE A DAY**PA, Disp: , Rfl:  "3  •  zolpidem (AMBIEN CR) 12.5 MG CR tablet, Take 1 Tab by mouth at bedtime as needed for Sleep (insomnia). (Patient not taking: Reported on 5/22/2019), Disp: 30 Tab, Rfl: 3  •  enalapril (VASOTEC) 20 MG tablet, TAKE 1 TABLET BY MOUTH TWICE A DAY (Patient not taking: Reported on 2/8/2019), Disp: 60 Tab, Rfl: 3  ALLERGIES: No Known Allergies  SURGHX:   Past Surgical History:   Procedure Laterality Date   • ZZZ CARDIAC CATH  12/2010   • OTHER  tonsilectomy   • OTHER CARDIAC SURGERY      catherization 12/2010, 12/2008   • TONSILLECTOMY AND ADENOIDECTOMY       SOCHX:  reports that he quit smoking about 10 years ago. His smoking use included cigarettes. He has a 7.50 pack-year smoking history. He has never used smokeless tobacco. He reports previous alcohol use. He reports that he does not use drugs..  FH:   Family History   Problem Relation Age of Onset   • Sleep Apnea Neg Hx          Physical Exam:  Vitals/ General Appearance:   Weight/BMI: Body mass index is 26.95 kg/m².  /92 (BP Location: Left arm, Patient Position: Sitting, BP Cuff Size: Adult)   Pulse 93   Resp 16   Ht 1.676 m (5' 6\")   Wt 75.8 kg (167 lb)   SpO2 96%   Vitals:    11/05/21 1536   BP: 138/92   BP Location: Left arm   Patient Position: Sitting   BP Cuff Size: Adult   Pulse: 93   Resp: 16   SpO2: 96%   Weight: 75.8 kg (167 lb)   Height: 1.676 m (5' 6\")       Pt. is alert and oriented to time, place and person. Cooperative and in no apparent distress.       Constitutional: Alert, no distress, well-groomed.  Skin: No rashes in visible areas.  Eye: Round. Conjunctiva clear, lids normal. No icterus.   ENMT: Lips pink without lesions, good dentition, moist mucous membranes. Phonation normal.  Neck: No masses, no thyromegaly. Moves freely without pain.  CV: Pulse as reported by patient  Respiratory: Unlabored respiratory effort, no cough or audible wheeze  Psych: Alert and oriented x3, normal affect and mood.     ASSESSMENT AND PLAN     1.  " Narcolepsy with cataplexy: He is currently on excessive amount of Adderall.  He was on  on Adderall XR 30 mg 2 to 3 tablets twice daily to 3 times daily.  Since he is exceeding the maximum dose of Adderall we discussed alternative medications including Sunosi and Wakix.  After detailed discussion we decided to try Sunosi.However it for not effective there fore increasing the dose to adderall Xr 30 mgs BID.  If for some reason her his symptoms are not well controlled on Adderall XR 60 mg we might try Wakix with combination of Adderall.  However it was clearly conveyed to the patient that we will not exceed more than 60 mg of Adderall a day.     We also discussed the importance of adequate sleep time which can help with his EDS.  Currently he is sleeping only 6 hours per night because he would like to stay up not because of insomnia.  Recommended short afternoon nap.     Risk of drowsy driving and work-related injuries were discussed in detail.  As per patient the DMV is aware of his diagnosis.     2. Regarding treatment of other past medical problems and general health maintenance,  He is urged to follow up with PCP.

## 2021-11-05 NOTE — LETTER
November 5, 2021         Patient: Johny Toledo   YOB: 1970   Date of Visit: 11/5/2021           To Whom it May Concern:    Johny Toledo was seen in my clinic on 11/5/2021. He may return to work on 11/8/2021.    If you have any questions or concerns, please don't hesitate to call.    Sincerely,       Raulito Lance M.D.  Electronically Signed

## 2021-11-05 NOTE — LETTER
November 5, 2021         Patient: Johny Toledo   YOB: 1970   Date of Visit: 11/5/2021           To Whom it May Concern:    Johny Toledo was seen in my clinic on 11/5/2021. He may return to work on 11/8/21.    If you have any questions or concerns, please don't hesitate to call.        Sincerely,           Raulito Lance M.D.  Electronically Signed

## 2021-11-15 ENCOUNTER — TELEPHONE (OUTPATIENT)
Dept: SLEEP MEDICINE | Facility: MEDICAL CENTER | Age: 51
End: 2021-11-15

## 2021-11-15 NOTE — TELEPHONE ENCOUNTER
MEDICATION PRIOR AUTHORIZATION NEEDED:    1. Name of Medication: Armodafinil 150 mg    2. Requested By (Name of Pharmacy): [facility]     3. Is insurance on file current? yes    4. What is the name & phone number of the 3rd party payor? [employer]

## 2021-11-15 NOTE — TELEPHONE ENCOUNTER
DOCUMENTATION OF PRIOR AUTH STATUS    1. Medication name and dose: Armodafinil 150 mg    2. Name and Phone # of Prescription coverage company: [employer]    3. Date Prior Auth was submitted: 11/15/2021    4. What information was given to obtain insurance decision: Clinical notes    5. Prior Auth letter Approved or Denied: Approved through 5/14/2022    6. Pharmacy notified: Yes    7. Patient notified: Yes

## 2021-12-05 DIAGNOSIS — G47.411 NARCOLEPSY WITH CATAPLEXY: ICD-10-CM

## 2021-12-06 RX ORDER — DEXTROAMPHETAMINE SACCHARATE, AMPHETAMINE ASPARTATE MONOHYDRATE, DEXTROAMPHETAMINE SULFATE AND AMPHETAMINE SULFATE 7.5; 7.5; 7.5; 7.5 MG/1; MG/1; MG/1; MG/1
30 CAPSULE, EXTENDED RELEASE ORAL 2 TIMES DAILY PRN
Qty: 60 CAPSULE | Refills: 0 | OUTPATIENT
Start: 2021-12-06 | End: 2022-01-05

## 2021-12-06 NOTE — TELEPHONE ENCOUNTER
Have we ever prescribed this med? Yes.  If yes, what date? 11/05/21    Last OV: 11/05/21 with Dr Lance    Next OV: 12/07/21 with Dr Lance    DX: Narcolepsy with cataplexy (G47.411)    Medications:   Requested Prescriptions     Pending Prescriptions Disp Refills   • amphetamine-dextroamphetamine ER (ADDERALL XR) 30 MG XR capsule 60 Capsule 0     Sig: Take 1 Capsule by mouth 2 times a day as needed for up to 30 days.

## 2021-12-07 ENCOUNTER — OFFICE VISIT (OUTPATIENT)
Dept: SLEEP MEDICINE | Facility: MEDICAL CENTER | Age: 51
End: 2021-12-07
Payer: COMMERCIAL

## 2021-12-07 VITALS
HEART RATE: 105 BPM | OXYGEN SATURATION: 97 % | HEIGHT: 66 IN | WEIGHT: 164 LBS | RESPIRATION RATE: 16 BRPM | DIASTOLIC BLOOD PRESSURE: 90 MMHG | BODY MASS INDEX: 26.36 KG/M2 | SYSTOLIC BLOOD PRESSURE: 142 MMHG

## 2021-12-07 DIAGNOSIS — G47.411 NARCOLEPSY WITH CATAPLEXY: ICD-10-CM

## 2021-12-07 PROCEDURE — 99213 OFFICE O/P EST LOW 20 MIN: CPT | Performed by: FAMILY MEDICINE

## 2021-12-07 RX ORDER — DEXTROAMPHETAMINE SACCHARATE, AMPHETAMINE ASPARTATE MONOHYDRATE, DEXTROAMPHETAMINE SULFATE AND AMPHETAMINE SULFATE 7.5; 7.5; 7.5; 7.5 MG/1; MG/1; MG/1; MG/1
30 CAPSULE, EXTENDED RELEASE ORAL 2 TIMES DAILY PRN
Qty: 60 CAPSULE | Refills: 0 | Status: SHIPPED | OUTPATIENT
Start: 2021-12-07 | End: 2022-01-06

## 2021-12-07 RX ORDER — ARMODAFINIL 200 MG/1
200 TABLET ORAL EVERY MORNING
Qty: 90 TABLET | Refills: 0 | Status: SHIPPED | OUTPATIENT
Start: 2021-12-07 | End: 2021-12-15 | Stop reason: SDUPTHER

## 2021-12-07 RX ORDER — DEXTROAMPHETAMINE SACCHARATE, AMPHETAMINE ASPARTATE MONOHYDRATE, DEXTROAMPHETAMINE SULFATE AND AMPHETAMINE SULFATE 7.5; 7.5; 7.5; 7.5 MG/1; MG/1; MG/1; MG/1
30 CAPSULE, EXTENDED RELEASE ORAL EVERY MORNING
COMMUNITY
End: 2021-12-07 | Stop reason: SDUPTHER

## 2021-12-07 RX ORDER — DEXTROAMPHETAMINE SACCHARATE, AMPHETAMINE ASPARTATE MONOHYDRATE, DEXTROAMPHETAMINE SULFATE AND AMPHETAMINE SULFATE 7.5; 7.5; 7.5; 7.5 MG/1; MG/1; MG/1; MG/1
30 CAPSULE, EXTENDED RELEASE ORAL 2 TIMES DAILY PRN
Qty: 60 CAPSULE | Refills: 0 | Status: SHIPPED | OUTPATIENT
Start: 2022-01-07 | End: 2022-02-06

## 2021-12-07 NOTE — LETTER
December 7, 2021         Patient: Johny Toledo   YOB: 1970   Date of Visit: 12/7/2021           To Whom it May Concern:    Johny Toledo was seen in my clinic on 12/7/2021. He may return to work on 12/17/20.    If you have any questions or concerns, please don't hesitate to call.        Sincerely,           Raulito Lance M.D.  Electronically Signed

## 2021-12-15 DIAGNOSIS — G47.411 NARCOLEPSY WITH CATAPLEXY: ICD-10-CM

## 2021-12-15 RX ORDER — ARMODAFINIL 200 MG/1
200 TABLET ORAL EVERY MORNING
Qty: 90 TABLET | Refills: 0 | Status: SHIPPED | OUTPATIENT
Start: 2021-12-15 | End: 2022-01-14

## 2021-12-15 NOTE — TELEPHONE ENCOUNTER
Caller: Deesan with [facility] pharmacy    Phone Number: [phone]    Message: Desean called and lm, they received a rx for Nuvigil 200mg.  They are requesting a dx code for this rx.:        12/15/21:  Called and spoke with pt's pharmacy, unfortunately he is not able to pull up the electronic rx we has sent. He would need it resent with the DX in the SIG.     Re-Pended RX.

## 2022-03-03 ENCOUNTER — OFFICE VISIT (OUTPATIENT)
Dept: SLEEP MEDICINE | Facility: MEDICAL CENTER | Age: 52
End: 2022-03-03
Payer: COMMERCIAL

## 2022-03-03 VITALS
DIASTOLIC BLOOD PRESSURE: 88 MMHG | BODY MASS INDEX: 27.16 KG/M2 | HEIGHT: 66 IN | WEIGHT: 169 LBS | OXYGEN SATURATION: 95 % | SYSTOLIC BLOOD PRESSURE: 130 MMHG | HEART RATE: 85 BPM | RESPIRATION RATE: 16 BRPM

## 2022-03-03 DIAGNOSIS — G47.411 NARCOLEPSY WITH CATAPLEXY: ICD-10-CM

## 2022-03-03 PROCEDURE — 99214 OFFICE O/P EST MOD 30 MIN: CPT | Performed by: FAMILY MEDICINE

## 2022-03-03 RX ORDER — DEXTROAMPHETAMINE SACCHARATE, AMPHETAMINE ASPARTATE MONOHYDRATE, DEXTROAMPHETAMINE SULFATE AND AMPHETAMINE SULFATE 7.5; 7.5; 7.5; 7.5 MG/1; MG/1; MG/1; MG/1
30 CAPSULE, EXTENDED RELEASE ORAL 2 TIMES DAILY PRN
Qty: 60 CAPSULE | Refills: 0 | Status: SHIPPED | OUTPATIENT
Start: 2022-04-08 | End: 2022-05-08

## 2022-03-03 RX ORDER — DEXTROAMPHETAMINE SACCHARATE, AMPHETAMINE ASPARTATE MONOHYDRATE, DEXTROAMPHETAMINE SULFATE AND AMPHETAMINE SULFATE 7.5; 7.5; 7.5; 7.5 MG/1; MG/1; MG/1; MG/1
30 CAPSULE, EXTENDED RELEASE ORAL 2 TIMES DAILY PRN
Qty: 60 CAPSULE | Refills: 0 | Status: SHIPPED | OUTPATIENT
Start: 2022-03-09 | End: 2022-04-08

## 2022-03-03 RX ORDER — DEXTROAMPHETAMINE SACCHARATE, AMPHETAMINE ASPARTATE MONOHYDRATE, DEXTROAMPHETAMINE SULFATE AND AMPHETAMINE SULFATE 7.5; 7.5; 7.5; 7.5 MG/1; MG/1; MG/1; MG/1
30 CAPSULE, EXTENDED RELEASE ORAL 2 TIMES DAILY PRN
Qty: 60 CAPSULE | Refills: 0 | Status: SHIPPED | OUTPATIENT
Start: 2022-05-08 | End: 2022-06-07

## 2022-03-03 RX ORDER — DEXTROAMPHETAMINE SACCHARATE, AMPHETAMINE ASPARTATE MONOHYDRATE, DEXTROAMPHETAMINE SULFATE AND AMPHETAMINE SULFATE 7.5; 7.5; 7.5; 7.5 MG/1; MG/1; MG/1; MG/1
30 CAPSULE, EXTENDED RELEASE ORAL EVERY MORNING
COMMUNITY
End: 2022-03-03 | Stop reason: SDUPTHER

## 2022-03-03 RX ORDER — ARMODAFINIL 200 MG/1
200 TABLET ORAL EVERY MORNING
Qty: 90 TABLET | Refills: 0 | Status: SHIPPED | OUTPATIENT
Start: 2022-03-03 | End: 2022-04-02

## 2022-03-03 ASSESSMENT — PATIENT HEALTH QUESTIONNAIRE - PHQ9: CLINICAL INTERPRETATION OF PHQ2 SCORE: 0

## 2022-03-03 NOTE — LETTER
March 3, 2022         Patient: Johny Toledo   YOB: 1970   Date of Visit: 3/3/2022           To Whom it May Concern:    Johny Toledo was seen in my clinic on 3/3/2022. He may return to work on 3/3/22.    If you have any questions or concerns, please don't hesitate to call.        Sincerely,           Raulito Lance M.D.  Electronically Signed

## 2022-03-03 NOTE — LETTER
March 3, 2022         Patient: Johny Toledo   YOB: 1970   Date of Visit: 3/3/2022           To Whom it May Concern:    Johny Toledo was seen in my clinic on 3/3/2022. He may return to work on 3/22/22.    If you have any questions or concerns, please don't hesitate to call.        Sincerely,           Raulito Lance M.D.  Electronically Signed

## 2022-03-03 NOTE — PROGRESS NOTES
[facility] Follow Up Note     Date: 3/3/2022 / Time: 10:14 AM    Patient ID:   Name:             Johny Toledo   YOB: 1970  Age:                 51 y.o.  male   MRN:               [id number]      Thank you for requesting a sleep medicine consultation on Johny Toledo at the sleep center. He presents today with the chief complaints of narcolepsy with cataplexy follow up. He is a previous patient of Dr. Koehler and [facility].  He was diagnosed with narcolepsy with cataplexy on 7/10/2006 via MSLT.  We do not have access to his PSG prior to the MSLT.  Based on the MSLT the sleep latency was 13.5 minutes and REM latency was 4.8.  He had SOREMPS on his first and second nap.    HISTORY OF PRESENT ILLNESS:        He goes to sleep around 12-1 am and wakes up around 8 am. He is getting about 8 hrs of sleep on a good night and about 6 hr of sleep on a bad night. The bad nights are on work nights.Overall,  He does not finds his sleep refreshing.He does not take regular naps.He drinks about 0 caffeinated beverages per day. He denies any symptoms of RLS, narcolepsy or any symptoms to suggest parasomnias such as nightmares, sleep walking or acting out of dreams.He is currently on adderall xr 30 mg BID. Due to residual EDS he was prescribed armodafonil 200 mg however as per pt the pharmacy never called for him to .  Denies recent episode of cataplexy, hypnogogic hallucinations and sleep paralysis. Previously he was taking multiple adderall xr 30 per day. He has tried nuvigil in past and unsure if it was help in past. EPWORTH is 14/24.       REVIEW OF SYSTEMS:       Constitutional: Denies fevers, Denies weight changes  Eyes: Denies changes in vision, no eye pain  Ears/Nose/Throat/Mouth: Denies nasal congestion or sore throat   Cardiovascular: Denies chest pain or palpitations   Respiratory: Denies shortness of breath , Denies cough  Gastrointestinal/Hepatic: Denies abdominal pain, nausea,  vomiting, diarrhea, constipation or GI bleeding   Genitourinary: Deniesdysuria or frequency  Musculoskeletal/Rheum: Denies  joint pain and swelling   Skin/Breast: Denies rash,   Neurological: Denies headache, confusion, memory loss or focal weakness/parasthesias  Psychiatric: denies mood disorder   Sleep: + EDS and denies cataplexy and     Comprehensive review of systems form is reviewed with the patient and is attached in the EMR.     PMH:  has a past medical history of Abnormal electrocardiogram (7/27/2011), Bronchitis, Chronic systolic congestive heart failure (HCC) (7/27/2011), Congestive heart failure (HCC), Dizziness (3/31/2011), Fall, Heart valve disease, Hyperlipidemia, Hypertension, Male erectile disorder (8/9/2011), Mixed hyperlipidemia (8/9/2011), Narcolepsy with cataplexy, Pulmonary embolism (HCC), and STEMI (ST elevation myocardial infarction) pk  trop 150 POBA LAD and DIAG med rx. (12/18/2010).  MEDS:   Current Outpatient Medications:   •  amphetamine-dextroamphetamine ER (ADDERALL XR, 30MG,) 30 MG XR capsule, Take 30 mg by mouth every morning., Disp: , Rfl:   •  atorvastatin (LIPITOR) 40 MG Tab, Take 40 mg by mouth., Disp: , Rfl:   •  ENTRESTO 49-51 MG Tab tablet, TAKE 1 TABLET BY MOUTH TWICE A DAY**PA, Disp: , Rfl: 3  •  warfarin (COUMADIN) 7.5 MG Tab, TAKE 1 & 1/2 TABLETS BY MOUTH ONCE DAILY, Disp: 45 Tab, Rfl: 6  •  carvedilol (COREG) 25 MG Tab, Take 25 mg by mouth 2 times a day, with meals., Disp: , Rfl:   •  amlodipine (NORVASC) 10 MG TABS, TAKE 1 TABLET BY MOUTH EVERY DAY, Disp: 30 Tab, Rfl: 6  •  aspirin EC (ECOTRIN) 81 MG TBEC, Take 1 Tab by mouth every day., Disp: 30 Each, Rfl: 11  •  ezetimibe (ZETIA) 10 MG Tab, Take 10 mg by mouth every day., Disp: , Rfl:   •  furosemide (LASIX) 20 MG Tab, Take 20 mg by mouth every day., Disp: , Rfl:   •  zolpidem (AMBIEN CR) 12.5 MG CR tablet, Take 1 Tab by mouth at bedtime as needed for Sleep (insomnia). (Patient not taking: Reported on 5/22/2019), Disp:  "30 Tab, Rfl: 3  •  enalapril (VASOTEC) 20 MG tablet, TAKE 1 TABLET BY MOUTH TWICE A DAY (Patient not taking: Reported on 2/8/2019), Disp: 60 Tab, Rfl: 3  ALLERGIES: No Known Allergies  SURGHX:   Past Surgical History:   Procedure Laterality Date   • ZZZ CARDIAC CATH  12/2010   • OTHER  tonsilectomy   • OTHER CARDIAC SURGERY      catherization 12/2010, 12/2008   • TONSILLECTOMY AND ADENOIDECTOMY       SOCHX:  reports that he quit smoking about 11 years ago. His smoking use included cigarettes. He has a 7.50 pack-year smoking history. He has never used smokeless tobacco. He reports previous alcohol use. He reports that he does not use drugs..  FH:   Family History   Problem Relation Age of Onset   • Sleep Apnea Neg Hx          Physical Exam:  Vitals/ General Appearance:   Weight/BMI: Body mass index is 27.28 kg/m².  /88 (BP Location: Left arm, Patient Position: Sitting, BP Cuff Size: Adult)   Pulse 85   Resp 16   Ht 1.676 m (5' 6\")   Wt 76.7 kg (169 lb)   SpO2 95%   Vitals:    03/03/22 1008   BP: 130/88   BP Location: Left arm   Patient Position: Sitting   BP Cuff Size: Adult   Pulse: 85   Resp: 16   SpO2: 95%   Weight: 76.7 kg (169 lb)   Height: 1.676 m (5' 6\")       Pt. is alert and oriented to time, place and person. Cooperative and in no apparent distress.       Constitutional: Alert, no distress, well-groomed.  Skin: No rashes in visible areas.  Eye: Round. Conjunctiva clear, lids normal. No icterus.   ENMT: Lips pink without lesions, good dentition, moist mucous membranes. Phonation normal.  Neck: No masses, no thyromegaly. Moves freely without pain.  CV: Pulse as reported by patient  Respiratory: Unlabored respiratory effort, no cough or audible wheeze  Psych: Alert and oriented x3, normal affect and mood.     ASSESSMENT AND PLAN     1.  Narcolepsy with cataplexy: He is currently on excessive amount of Adderall.  He was on  on Adderall XR 30 mg 2 to 3 tablets twice daily to 3 times daily.  In past he " has also tried Sunosi in [facility] with minimal improvement.  However it was clearly conveyed to the patient that we will not exceed more than 60 mg of Adderall a day.     We also discussed the importance of adequate sleep time which can help with his EDS.  Currently he is sleeping only 6 hours per night because he would like to stay up not because of insomnia.  Recommended short afternoon nap.     Risk of drowsy driving and work-related injuries were discussed in detail.  As per patient the DMV is aware of his diagnosis.    Plan   -Adderall XR 30 mg twice daily as needed with 3 refills   -Starting on armodafinil 200 mg daily as needed   -Drug holiday was recommended prevent tolerance and dependency    2. Regarding treatment of other past medical problems and general health maintenance,  He is urged to follow up with PCP.

## 2022-05-09 ENCOUNTER — APPOINTMENT (OUTPATIENT)
Dept: RADIOLOGY | Facility: MEDICAL CENTER | Age: 52
End: 2022-05-09
Attending: EMERGENCY MEDICINE
Payer: COMMERCIAL

## 2022-05-09 ENCOUNTER — HOSPITAL ENCOUNTER (EMERGENCY)
Facility: MEDICAL CENTER | Age: 52
End: 2022-05-09
Attending: EMERGENCY MEDICINE
Payer: COMMERCIAL

## 2022-05-09 VITALS
BODY MASS INDEX: 26.84 KG/M2 | TEMPERATURE: 98.1 F | RESPIRATION RATE: 19 BRPM | HEART RATE: 85 BPM | DIASTOLIC BLOOD PRESSURE: 73 MMHG | OXYGEN SATURATION: 91 % | SYSTOLIC BLOOD PRESSURE: 136 MMHG | HEIGHT: 66 IN | WEIGHT: 167 LBS

## 2022-05-09 DIAGNOSIS — H92.02 LEFT EAR PAIN: ICD-10-CM

## 2022-05-09 DIAGNOSIS — R79.1 SUBTHERAPEUTIC INTERNATIONAL NORMALIZED RATIO (INR): ICD-10-CM

## 2022-05-09 DIAGNOSIS — B34.9 ACUTE VIRAL SYNDROME: ICD-10-CM

## 2022-05-09 DIAGNOSIS — R55 SYNCOPE, UNSPECIFIED SYNCOPE TYPE: ICD-10-CM

## 2022-05-09 DIAGNOSIS — H65.192 OTHER NON-RECURRENT ACUTE NONSUPPURATIVE OTITIS MEDIA OF LEFT EAR: ICD-10-CM

## 2022-05-09 LAB
ALBUMIN SERPL BCP-MCNC: 4.1 G/DL (ref 3.2–4.9)
ALBUMIN/GLOB SERPL: 1.1 G/DL
ALP SERPL-CCNC: 93 U/L (ref 30–99)
ALT SERPL-CCNC: 45 U/L (ref 2–50)
ANION GAP SERPL CALC-SCNC: 14 MMOL/L (ref 7–16)
AST SERPL-CCNC: 36 U/L (ref 12–45)
BASOPHILS # BLD AUTO: 0 % (ref 0–1.8)
BASOPHILS # BLD: 0 K/UL (ref 0–0.12)
BILIRUB SERPL-MCNC: 0.4 MG/DL (ref 0.1–1.5)
BUN SERPL-MCNC: 28 MG/DL (ref 8–22)
CALCIUM SERPL-MCNC: 9 MG/DL (ref 8.5–10.5)
CHLORIDE SERPL-SCNC: 99 MMOL/L (ref 96–112)
CO2 SERPL-SCNC: 24 MMOL/L (ref 20–33)
CREAT SERPL-MCNC: 1.22 MG/DL (ref 0.5–1.4)
EKG IMPRESSION: NORMAL
EOSINOPHIL # BLD AUTO: 0.12 K/UL (ref 0–0.51)
EOSINOPHIL NFR BLD: 0.9 % (ref 0–6.9)
ERYTHROCYTE [DISTWIDTH] IN BLOOD BY AUTOMATED COUNT: 40.9 FL (ref 35.9–50)
FLUAV RNA SPEC QL NAA+PROBE: NEGATIVE
FLUBV RNA SPEC QL NAA+PROBE: NEGATIVE
GFR SERPLBLD CREATININE-BSD FMLA CKD-EPI: 71 ML/MIN/1.73 M 2
GLOBULIN SER CALC-MCNC: 3.8 G/DL (ref 1.9–3.5)
GLUCOSE SERPL-MCNC: 116 MG/DL (ref 65–99)
HCT VFR BLD AUTO: 45.7 % (ref 42–52)
HGB BLD-MCNC: 15.9 G/DL (ref 14–18)
INR PPP: 0.96 (ref 0.87–1.13)
LYMPHOCYTES # BLD AUTO: 4.19 K/UL (ref 1–4.8)
LYMPHOCYTES NFR BLD: 32.5 % (ref 22–41)
MANUAL DIFF BLD: NORMAL
MCH RBC QN AUTO: 31.4 PG (ref 27–33)
MCHC RBC AUTO-ENTMCNC: 34.8 G/DL (ref 33.7–35.3)
MCV RBC AUTO: 90.1 FL (ref 81.4–97.8)
MONOCYTES # BLD AUTO: 0.79 K/UL (ref 0–0.85)
MONOCYTES NFR BLD AUTO: 6.1 % (ref 0–13.4)
MORPHOLOGY BLD-IMP: NORMAL
NEUTROPHILS # BLD AUTO: 7.8 K/UL (ref 1.82–7.42)
NEUTROPHILS NFR BLD: 60.5 % (ref 44–72)
NRBC # BLD AUTO: 0 K/UL
NRBC BLD-RTO: 0 /100 WBC
PLATELET # BLD AUTO: 261 K/UL (ref 164–446)
PLATELET BLD QL SMEAR: NORMAL
PMV BLD AUTO: 9.4 FL (ref 9–12.9)
POTASSIUM SERPL-SCNC: 3.5 MMOL/L (ref 3.6–5.5)
PROT SERPL-MCNC: 7.9 G/DL (ref 6–8.2)
PROTHROMBIN TIME: 12.5 SEC (ref 12–14.6)
RBC # BLD AUTO: 5.07 M/UL (ref 4.7–6.1)
RBC BLD AUTO: PRESENT
RSV RNA SPEC QL NAA+PROBE: NEGATIVE
SARS-COV-2 RNA RESP QL NAA+PROBE: NOTDETECTED
SMUDGE CELLS BLD QL SMEAR: NORMAL
SODIUM SERPL-SCNC: 137 MMOL/L (ref 135–145)
SPECIMEN SOURCE: NORMAL
TROPONIN T SERPL-MCNC: 12 NG/L (ref 6–19)
WBC # BLD AUTO: 12.9 K/UL (ref 4.8–10.8)

## 2022-05-09 PROCEDURE — 71045 X-RAY EXAM CHEST 1 VIEW: CPT

## 2022-05-09 PROCEDURE — 80053 COMPREHEN METABOLIC PANEL: CPT

## 2022-05-09 PROCEDURE — 99285 EMERGENCY DEPT VISIT HI MDM: CPT

## 2022-05-09 PROCEDURE — A9270 NON-COVERED ITEM OR SERVICE: HCPCS

## 2022-05-09 PROCEDURE — 84484 ASSAY OF TROPONIN QUANT: CPT

## 2022-05-09 PROCEDURE — 700111 HCHG RX REV CODE 636 W/ 250 OVERRIDE (IP): Performed by: EMERGENCY MEDICINE

## 2022-05-09 PROCEDURE — 700102 HCHG RX REV CODE 250 W/ 637 OVERRIDE(OP)

## 2022-05-09 PROCEDURE — 36415 COLL VENOUS BLD VENIPUNCTURE: CPT

## 2022-05-09 PROCEDURE — C9803 HOPD COVID-19 SPEC COLLECT: HCPCS | Performed by: EMERGENCY MEDICINE

## 2022-05-09 PROCEDURE — 93005 ELECTROCARDIOGRAM TRACING: CPT

## 2022-05-09 PROCEDURE — A9270 NON-COVERED ITEM OR SERVICE: HCPCS | Performed by: EMERGENCY MEDICINE

## 2022-05-09 PROCEDURE — 85025 COMPLETE CBC W/AUTO DIFF WBC: CPT

## 2022-05-09 PROCEDURE — 85007 BL SMEAR W/DIFF WBC COUNT: CPT

## 2022-05-09 PROCEDURE — 96374 THER/PROPH/DIAG INJ IV PUSH: CPT

## 2022-05-09 PROCEDURE — 700102 HCHG RX REV CODE 250 W/ 637 OVERRIDE(OP): Performed by: EMERGENCY MEDICINE

## 2022-05-09 PROCEDURE — 93005 ELECTROCARDIOGRAM TRACING: CPT | Performed by: EMERGENCY MEDICINE

## 2022-05-09 PROCEDURE — 0241U HCHG SARS-COV-2 COVID-19 NFCT DS RESP RNA 4 TRGT MIC: CPT

## 2022-05-09 PROCEDURE — 85610 PROTHROMBIN TIME: CPT

## 2022-05-09 RX ORDER — ACETAMINOPHEN 500 MG
1000 TABLET ORAL ONCE
Status: COMPLETED | OUTPATIENT
Start: 2022-05-09 | End: 2022-05-09

## 2022-05-09 RX ORDER — AMOXICILLIN AND CLAVULANATE POTASSIUM 875; 125 MG/1; MG/1
1 TABLET, FILM COATED ORAL 2 TIMES DAILY
Qty: 14 TABLET | Refills: 0 | Status: SHIPPED | OUTPATIENT
Start: 2022-05-09 | End: 2022-05-16

## 2022-05-09 RX ORDER — OXYCODONE HYDROCHLORIDE 5 MG/1
5 TABLET ORAL EVERY 6 HOURS PRN
Qty: 10 TABLET | Refills: 0 | Status: SHIPPED | OUTPATIENT
Start: 2022-05-09 | End: 2022-05-09 | Stop reason: SDUPTHER

## 2022-05-09 RX ORDER — AMOXICILLIN AND CLAVULANATE POTASSIUM 875; 125 MG/1; MG/1
1 TABLET, FILM COATED ORAL ONCE
Status: COMPLETED | OUTPATIENT
Start: 2022-05-09 | End: 2022-05-09

## 2022-05-09 RX ORDER — BENZONATATE 100 MG/1
100 CAPSULE ORAL 3 TIMES DAILY PRN
Qty: 30 CAPSULE | Refills: 0 | Status: SHIPPED | OUTPATIENT
Start: 2022-05-09 | End: 2022-05-19

## 2022-05-09 RX ORDER — OXYCODONE HYDROCHLORIDE 5 MG/1
5 TABLET ORAL ONCE
Status: COMPLETED | OUTPATIENT
Start: 2022-05-09 | End: 2022-05-09

## 2022-05-09 RX ORDER — OXYCODONE HYDROCHLORIDE 5 MG/1
5 TABLET ORAL EVERY 6 HOURS PRN
Qty: 6 TABLET | Refills: 0 | Status: SHIPPED | OUTPATIENT
Start: 2022-05-09 | End: 2022-05-12

## 2022-05-09 RX ORDER — KETOROLAC TROMETHAMINE 30 MG/ML
15 INJECTION, SOLUTION INTRAMUSCULAR; INTRAVENOUS ONCE
Status: COMPLETED | OUTPATIENT
Start: 2022-05-09 | End: 2022-05-09

## 2022-05-09 RX ORDER — BENZONATATE 100 MG/1
100 CAPSULE ORAL ONCE
Status: COMPLETED | OUTPATIENT
Start: 2022-05-09 | End: 2022-05-09

## 2022-05-09 RX ADMIN — BENZONATATE 100 MG: 100 CAPSULE ORAL at 04:42

## 2022-05-09 RX ADMIN — KETOROLAC TROMETHAMINE 15 MG: 30 INJECTION, SOLUTION INTRAMUSCULAR at 05:55

## 2022-05-09 RX ADMIN — AMOXICILLIN AND CLAVULANATE POTASSIUM 1 TABLET: 875; 125 TABLET, FILM COATED ORAL at 04:42

## 2022-05-09 RX ADMIN — OXYCODONE 5 MG: 5 TABLET ORAL at 05:55

## 2022-05-09 RX ADMIN — ACETAMINOPHEN 1000 MG: 500 TABLET ORAL at 05:04

## 2022-05-09 NOTE — Clinical Note
Johny Toledo was seen and treated in our emergency department on 5/9/2022.  He may return to work on 05/16/2022.       If you have any questions or concerns, please don't hesitate to call.      Jayden Rodriguez M.D.

## 2022-05-09 NOTE — ED PROVIDER NOTES
ED Provider Note    Scribed for Jayden Rodriguez M.D. by Gaviota Durand. 5/9/2022,  4:14 AM.    Means of Arrival: Walk in  History obtained from: Patient  History limited by: None    CHIEF COMPLAINT  Chief Complaint   Patient presents with   • Ear Pain     Cannot hear out of L ear, 10/10 pain   • Chest Pain     Pacer in place, reports CP since being sick       [facility]  Johny Toledo is a 51 y.o. male who presents to the Emergency Department for ear pain and chest pain. Per the patient, he initially just had chills and body aches 6 days ago before developing nasal congestion and a cough. He describes having two coughing fits severe enough to make him lightheaded and eventually pass out. He states he is primarily here due to severe pressure in his left ear and left sided decreased hearing that began 2 days ago, however he notes he began experiencing shortness of breath and chest pain with his coughing yesterday. The patient additionally had a fever 5 days ago. Breathing treatments originally prescribed for his daughter's asthma provided no alleviation. The last time he passed out was with his prior heart attack. He is on Warfarin and has a pacemaker.     REVIEW OF SYSTEMS  CONSTITUTIONAL:  Fever (resolved). Chills. Ear pressure.   CARDIOVASCULAR:  Chest discomfort with coughing.   RESPIRATORY:  Cough. Shortness of breath. Nasal congestion.   MUSCULOSKELETAL:  Body aches.   NEUROLOGIC:  Lightheadedness.  See HPI for further details.   All other systems are negative.     PAST MEDICAL HISTORY  Past Medical History:   Diagnosis Date   • Abnormal electrocardiogram 7/27/2011   • Bronchitis    • Chronic systolic congestive heart failure (HCC) 7/27/2011   • Congestive heart failure (HCC)    • Dizziness 3/31/2011   • Fall    • Heart valve disease    • Hyperlipidemia    • Hypertension    • Male erectile disorder 8/9/2011   • Mixed hyperlipidemia 8/9/2011   • Narcolepsy with cataplexy    • Pulmonary embolism (HCC)    • STEMI (ST  "elevation myocardial infarction) pk  trop 150 POBA LAD and DIAG med rx. 12/18/2010       FAMILY HISTORY  Family History   Problem Relation Age of Onset   • Sleep Apnea Neg Hx        SOCIAL HISTORY   reports that he quit smoking about 11 years ago. His smoking use included cigarettes. He has a 7.50 pack-year smoking history. He has never used smokeless tobacco. He reports previous alcohol use. He reports that he does not use drugs.    SURGICAL HISTORY  Past Surgical History:   Procedure Laterality Date   • ZZZ CARDIAC CATH  12/2010   • OTHER  tonsilectomy   • OTHER CARDIAC SURGERY      catherization 12/2010, 12/2008   • TONSILLECTOMY AND ADENOIDECTOMY         CURRENT MEDICATIONS  Home Medications     Reviewed by Tenisha Nieto R.N. (Registered Nurse) on 05/09/22 at 0349  Med List Status: <None>   Medication Last Dose Status   amlodipine (NORVASC) 10 MG TABS  Active   amphetamine-dextroamphetamine ER (ADDERALL XR) 30 MG XR capsule  Active   aspirin EC (ECOTRIN) 81 MG TBEC  Active   atorvastatin (LIPITOR) 40 MG Tab  Active   carvedilol (COREG) 25 MG Tab  Active   enalapril (VASOTEC) 20 MG tablet  Active   ENTRESTO 49-51 MG Tab tablet  Active   ezetimibe (ZETIA) 10 MG Tab  Active   furosemide (LASIX) 20 MG Tab  Active   warfarin (COUMADIN) 7.5 MG Tab  Active   zolpidem (AMBIEN CR) 12.5 MG CR tablet  Active                ALLERGIES  No Known Allergies    PHYSICAL EXAM  VITAL SIGNS: BP (!) 163/96   Pulse 84   Temp 36.2 °C (97.2 °F) (Temporal)   Resp 13   Ht 1.676 m (5' 6\")   Wt 75.8 kg (167 lb)   SpO2 96%   BMI 26.95 kg/m²    Gen: Alert, no acute distress  HEENT: ATNC. Normal right TM, left TM has slight erythema with bulging and fluid behind the TM.   Eyes: PERRL, EOMI, normal conjunctiva.   Neck: trachea midline  Resp: Lungs clear to auscultation bilaterally, Dry cough. No respiratory distress  CV: Regular rate and rhythm, no murmurs. No pedal edema, no calf tenderness. No JVD  Abd: non-distended, soft, " nontender  Ext: No deformities  Psych: normal mood  Neuro: speech fluent, no focal neurologic deficits    DIAGNOSTIC STUDIES / PROCEDURES     EKG  Results for orders placed or performed during the hospital encounter of 22   EKG   Result Value Ref Range    Report       [facility] Emergency Dept.    Test Date:  2022  Pt Name:    LAVELL SPRINGER                 Department: ER  MRN:        [id number]                      Room:  Gender:     Male                         Technician: [id number]  :        1970                   Requested By:ER TRIAGE PROTOCOL  Order #:    [id number]                    Reading MD: Jayden Rodriguez    Measurements  Intervals                                Axis  Rate:       85                           P:          56  VA:         171                          QRS:        6  QRSD:       90                           T:          113  QT:         401  QTc:        477    Interpretive Statements  Sinus rhythm  Probable anteroseptal infarct, old  Nonspecific T abnormalities, lateral leads  Compared to ECG 2011 10:30:47  Myocardial infarct finding now present  Prolonged QT interval no longer present  Left ventricular hypertrophy no longer present  Electronically Signed On 2022 4:34:43 PDT by Jayden Rodriguez          LABS  Labs Reviewed   CBC WITH DIFFERENTIAL - Abnormal; Notable for the following components:       Result Value    WBC 12.9 (*)     Neutrophils (Absolute) 7.80 (*)     All other components within normal limits   COMP METABOLIC PANEL - Abnormal; Notable for the following components:    Potassium 3.5 (*)     Glucose 116 (*)     Bun 28 (*)     Globulin 3.8 (*)     All other components within normal limits   COV-2, FLU A/B, AND RSV BY PCR (CEPHEID)   TROPONIN   PROTHROMBIN TIME    Narrative:     Indicate which anticoagulants the patient is on:->UNKNOWN   ESTIMATED GFR   DIFFERENTIAL MANUAL   PERIPHERAL SMEAR REVIEW   PLATELET ESTIMATE   MORPHOLOGY     All labs  reviewed by me.    RADIOLOGY  DX-CHEST-PORTABLE (1 VIEW)   Final Result      No acute cardiopulmonary abnormality.        The radiologist’s interpretation of all radiology studies have been reviewed by me.    COURSE & MEDICAL DECISION MAKING  Pertinent Labs & Imaging studies reviewed. (See chart for details)    4:14 AM Patient seen and examined at bedside. Ordered for labs and imaging to evaluate. Patient will be treated with Augmentin 125 mg and Tessalon 100 mg for his symptoms.    5:31 AM Paged cardiology. Patient re-evaluated at bedside. Patient reports missing a couple of doses of his Warfarin due to feeling ill. Discussed patient's condition and treatment plan. The patient understood and is in agreement.     5:32 AM I discussed the patient's case and the above findings with Dr. Caro (Cardiology) who agrees with plan for discharge    Medical Decision Making:  Interrogation of the patient's pacemaker revealed the patient had 1 episode of nonsustained ventricular tachycardia on 4/5/22 with no interventions. No other events noted.     Patient presents with severe left ear pain.  He appears to have otitis media, will start antibiotics for this.  Given his severe pain, I believe that nonopioid pain medications will be insufficient as the patient should not be taking nonsteroidal anti-inflammatory medications as he is supposed to be on Coumadin.    In prescribing controlled substances to this patient, I certify that I have obtained and reviewed the medical history of Johny Toledo. I have also made a good jeffry effort to obtain applicable records from other providers who have treated the patient and records did not demonstrate any increased risk of substance abuse that would prevent me from prescribing controlled substances.     I have conducted a physical exam and documented it. I have reviewed Mr. Toledo’s prescription history as maintained by the [place] Prescription Monitoring Program.     I have assessed the  patient’s risk for abuse, dependency, and addiction using the validated Opioid Risk Tool available at https://www.mdcalc.com/mkzvop-ctfk-pkse-ort-narcotic-abuse.     Given the above, I believe the benefits of controlled substance therapy outweigh the risks. The reasons for prescribing controlled substances include non-narcotic, oral analgesic alternatives have been inadequate for pain control. Accordingly, I have discussed the risk and benefits, treatment plan, and alternative therapies with the patient.   The risks of the medication, including constipation, addiction and altered mental status were discussed with the patient and they expressed understanding. They were encouraged to use the minimum dose necessary to control their breakthrough pain.    The patient's INR was subtherapeutic.  He reports that he has not been taking his Coumadin recently.  He was counseled on restarting this.  As he has subtherapeutic INR, I felt comfortable giving a single dose of ketorolac here in the emergency department.  Patient was recommended to bridge with Lovenox, however declines.  He states he has Lovenox at home but is not wanting to perform the injections.    Regarding the patient's syncope, his pacemaker interrogation was reassuring, no high risk cardiac features.  EKG is also reassuring.  Troponin is negative.  No stigmata of DVT/PE.  The patient likely had reflux and could be in the setting of severe coughing.    COVID-19 testing performed, negative.    The patient was given return precautions, anticipatory guidance, and the opportunity to ask questions prior to discharge.       DISPOSITION:  Patient will be discharged home in stable condition.    FOLLOW UP:  JOSE FRANCISCO JorgensenNBee  [address]  [facility] [address]  [facility] [address] [phone]    Schedule an appointment as soon as possible for a visit       Bryce Maharaj M.D.  [address]  [address]  [facility] [id number] [phone]    Schedule an  appointment as soon as possible for a visit       [facility], Emergency Dept  [address] [facility]  [place] [address] [phone]    If symptoms worsen      OUTPATIENT MEDICATIONS:  New Prescriptions    AMOXICILLIN-CLAVULANATE (AUGMENTIN) 875-125 MG TAB    Take 1 Tablet by mouth 2 times a day for 7 days.    BENZONATATE (TESSALON PERLES) 100 MG CAP    Take 1 Capsule by mouth 3 times a day as needed for Cough for up to 10 days.    OXYCODONE IMMEDIATE-RELEASE (ROXICODONE) 5 MG TAB    Take 1 Tablet by mouth every 6 hours as needed for Severe Pain for up to 3 days.       FINAL IMPRESSION  1. Other non-recurrent acute nonsuppurative otitis media of left ear    2. Acute viral syndrome    3. Subtherapeutic international normalized ratio (INR)    4. Syncope, unspecified syncope type    5. Left ear pain            Gaviota SCANLON (Scribe), am scribing for, and in the presence of, Jayden Rodriguez M.D..    Electronically signed by: Gaviota Durand (Scribe), 5/9/2022    IJayden M.D. personally performed the services described in this documentation, as scribed by Gaviota Durand in my presence, and it is both accurate and complete.    The note accurately reflects work and decisions made by me.  Jayden Rodriguez M.D.  5/9/2022  6:26 AM    C    This dictation was created using voice recognition software. The accuracy of the dictation is limited to the abilities of the software. I expect there may be some errors of grammar and possibly content. The nursing notes were reviewed and certain aspects of this information were incorporated into this note.

## 2022-05-09 NOTE — DISCHARGE INSTRUCTIONS
You are seen in the emergency department for ear pain.  You appear to have a middle ear infection on the left-hand side.  You are being prescribed antibiotics for this.    Your chest x-ray shows no pneumonia.  You likely have a viral illness.  Your COVID testing was negative as well as influenza.    Please drink plenty of water.  For pain, you can take acetaminophen (Tylenol) 1000 mg every 8 hours as needed for pain.  Please do not exceed 3000 mg in any 24 hours from all sources.    You are being sent home with an opioid pain medication. This medication causes sedation and you should not drive or operate machinery while taking it. You should not use alcohol or recreational drugs while taking this medication. Side effects of this medication can include itching, nausea, and constipation.    There is a risk of addiction to this medication and you should only take the smallest amount necessary to control your symptoms. You should use other non-opioid pain medications for your baseline pain and only use this medication if necessary.     There are many alternatives to pain medications, such as massage, acupuncture, and meditation. Check with your health insurance regarding their coverage of these complementary treatments.    We are not able to refill opioid or other controlled substance prescriptions in the emergency department. If you are having ongoing pain that requires opioids, please see your primary care provider or specialist for further prescriptions.     You were seen in the ED for syncope (loss of consciousness). Your medical evaluation, physical exam and EKG were reassuring. At this time, the cause of your syncope does not appear to be dangerous. Please drink plenty of fluids and eat regular meals. Please take care when getting up from sitting or standing.     Your INR was not therapeutic.  Please start taking your warfarin again and please follow-up closely with your cardiologist.    Please return to the ED or  seek medical attention if you develop:  - Chest pain  - Shortness of breath  - Loss of consciousness  - Other concerning findings  Please follow up with your regular doctor.

## 2022-05-09 NOTE — ED TRIAGE NOTES
Johny Toledo  51 y.o.  Chief Complaint   Patient presents with   • Ear Pain     Cannot hear out of L ear, 10/10 pain   • Chest Pain     Pacer in place, reports CP since being sick     Ambulatory to [facility] for above, pt main complaint is his ear pain, pt reports 10/10 pain to L ear. Pt has pacer/defibrillator in place, reports 2x syncopal episodes from coughing fits at home. Reports CP most likely due to coughing. A&Ox4, GCS 15, placed in [facility].  
Attending with

## 2022-06-02 ENCOUNTER — APPOINTMENT (OUTPATIENT)
Dept: SLEEP MEDICINE | Facility: MEDICAL CENTER | Age: 52
End: 2022-06-02
Payer: COMMERCIAL

## 2022-06-10 ENCOUNTER — OFFICE VISIT (OUTPATIENT)
Dept: SLEEP MEDICINE | Facility: MEDICAL CENTER | Age: 52
End: 2022-06-10
Payer: COMMERCIAL

## 2022-06-10 VITALS
WEIGHT: 160 LBS | BODY MASS INDEX: 25.71 KG/M2 | SYSTOLIC BLOOD PRESSURE: 160 MMHG | DIASTOLIC BLOOD PRESSURE: 100 MMHG | HEIGHT: 66 IN | OXYGEN SATURATION: 95 % | HEART RATE: 105 BPM | RESPIRATION RATE: 16 BRPM

## 2022-06-10 DIAGNOSIS — G47.10 HYPERSOMNOLENCE: ICD-10-CM

## 2022-06-10 PROCEDURE — 99214 OFFICE O/P EST MOD 30 MIN: CPT | Performed by: PREVENTIVE MEDICINE

## 2022-06-10 RX ORDER — DEXTROAMPHETAMINE SACCHARATE, AMPHETAMINE ASPARTATE, DEXTROAMPHETAMINE SULFATE AND AMPHETAMINE SULFATE 3.75; 3.75; 3.75; 3.75 MG/1; MG/1; MG/1; MG/1
15 TABLET ORAL 2 TIMES DAILY
Qty: 60 TABLET | Refills: 0 | Status: SHIPPED | OUTPATIENT
Start: 2022-06-10 | End: 2022-07-10

## 2022-06-10 ASSESSMENT — FIBROSIS 4 INDEX: FIB4 SCORE: 1.05

## 2022-06-10 NOTE — PROGRESS NOTES
"CHIEF COMPLAINT: Requesting stimulant refill  LAST SEEN: Dr. Lance 12/7/2021  HISTORY OF PRESENT ILLNESS:  Johny Toledo is a 51 y.o.male.  He was told by Dr Koehler( after completing an overnight \"sleep study\" and MSLT in 11/2006) to R/O  Narcolepsy type 1.  There are no records available here of the  \"sleep study\".  The MSLT ( 4 naps)  was done on 11/10/2006 and it showed Sleep latency of 13.5 minutes and he had 2 SOREM s.  These DATA   ARE NOT CONSISTENT  WITH  a  NARCOLEPSY.  DIAGNOSIS.     This pt was was told that he had Narcolepsy with Cataplexy.  He was treated initially by Dr Koehler .  And due to insurance problems he was asked to find another provider in the renal area and he started seeing Dr Singleton with [facility]  in 2010.  In 2010 he was taking two 30 mg Adderall tablets twice a day and   Provigil 200 mg BID and he was still experiencing daytime sleepiness.  During his first visit with [facility] he was also started on Xyrem 3 g 4 times a day. At that time he would stop his medications on the weekends because otherwise he could not sleep.   By August 2015 he was seeing Dr. BECKFORD and was continuing to take Adderall 30 mg XR 2 tablets up to 3 times a day.  He was also using Ambien for sleep.  At this time he was 45 years of age and had already been diagnosed with heart failure and high blood pressure.  By the time he was seen again by Dr. BECKFORD in 2016 he had been diagnosed additionally with a cardiomyopathy and pulmonary emboli.  Also by 2016 cataplexy was no longer in evidence.  And Dr. BECKFORD continued to the Adderall Exar 30 mg tablets with the patient taking 2 tablets up to 3 times a day.  Dr. BECKFORD continued to care for this patient until she left [facility] August or September of 2021.  Dr. Lance then saw this patient on October 8, 2021.  He explained that he was on doses of Adderall that were higher than the recommended daily dose.  He reduced his doses to Adderall XR 30 mg 1 tab p.o. twice daily    This is the first " time I am seeing the patient.  He is requesting refills of his Adderall.  When asked for an update regarding his cardiology condition he indicated that he sees a provider at [place] for his heart failure and that he is now on a cardiac transplant list at [place].  Also his pressure was 160/100 today and this was confirmed by 2 measurements.  The patient stated that he had just taken his pain medication before coming into the office    His most recent EKG done on May 9, 2022 reveals sinus rhythm, possible anteroseptal infarct old, normal sinus rhythm, QTC of 477 which is prolonged for a male, and nonspecific T wave abnormalities lateral leads.  He states that he is scheduled for another echo next month at [facility].  Today the patient signed an SOFIYA for additional cardiac documentation from [facility].    Significant comorbidities and modifying factors: Patient has hypertension, cardiomyopathy, congestive heart failure, and possibly some form of hypersomnolence.    PROBLEM LIST:  Patient Active Problem List    Diagnosis Date Noted   • Apical mural thrombus following MI (HCC) 05/11/2017   • CAD (coronary artery disease) 07/05/2012   • Male erectile disorder 08/09/2011   • Mixed hyperlipidemia 08/09/2011   • Abnormal electrocardiogram 07/27/2011   • Chronic systolic congestive heart failure (HCC) 07/27/2011   • Dizziness 03/31/2011   • STEMI (ST elevation myocardial infarction) pk  trop 150 POBA LAD and DIAG med rx. 12/18/2010   • Cardiomyopathy, nonischemic EF 15% no ascvd, query spasm vs. intracornary thrombus. 12/18/2010   • Sleep apnea 12/18/2010   • Narcolepsy with cataplexy 12/18/2010   • HTN (hypertension) 12/18/2010     PAST MEDICAL HISTORY:  Past Medical History:   Diagnosis Date   • Abnormal electrocardiogram 7/27/2011   • Bronchitis    • Chronic systolic congestive heart failure (HCC) 7/27/2011   • Congestive heart failure (HCC)    • Dizziness 3/31/2011   • Fall    • Heart valve disease    •  Hyperlipidemia    • Hypertension    • Male erectile disorder 2011   • Mixed hyperlipidemia 2011   • Narcolepsy with cataplexy    • Pulmonary embolism (HCC)    • STEMI (ST elevation myocardial infarction) pk  trop 150 POBA LAD and DIAG med rx. 2010      PAST SOCIAL HISTORY:  Past Surgical History:   Procedure Laterality Date   • ZZZ CARDIAC CATH  2010   • OTHER  tonsilectomy   • OTHER CARDIAC SURGERY      catherization 2010, 2008   • TONSILLECTOMY AND ADENOIDECTOMY       PAST FAMILY HISTORY:  Family History   Problem Relation Age of Onset   • Sleep Apnea Neg Hx      SOCIAL HISTORY:  Social History     Socioeconomic History   • Marital status:      Spouse name: Not on file   • Number of children: Not on file   • Years of education: Not on file   • Highest education level: Not on file   Occupational History   • Not on file   Tobacco Use   • Smoking status: Former Smoker     Packs/day: 0.50     Years: 15.00     Pack years: 7.50     Types: Cigarettes     Quit date: 2010     Years since quittin.5   • Smokeless tobacco: Never Used   • Tobacco comment: 1/2 pack/day   Vaping Use   • Vaping Use: Never used   Substance and Sexual Activity   • Alcohol use: Not Currently   • Drug use: No   • Sexual activity: Not on file   Other Topics Concern   • Not on file   Social History Narrative   • Not on file     Social Determinants of Health     Financial Resource Strain: Not on file   Food Insecurity: Not on file   Transportation Needs: Not on file   Physical Activity: Not on file   Stress: Not on file   Social Connections: Not on file   Intimate Partner Violence: Not on file   Housing Stability: Not on file     ALLERGIES: Patient has no known allergies.  MEDICATIONS:  Current Outpatient Medications   Medication Sig Dispense Refill   • amphetamine-dextroamphetamine (ADDERALL) 15 MG tablet Take 1 Tablet by mouth 2 times a day for 30 days. This is a 30 day supply. 60 Tablet 0   • atorvastatin  "(LIPITOR) 40 MG Tab Take 40 mg by mouth.     • ENTRESTO 49-51 MG Tab tablet TAKE 1 TABLET BY MOUTH TWICE A DAY**PA  3   • warfarin (COUMADIN) 7.5 MG Tab TAKE 1 & 1/2 TABLETS BY MOUTH ONCE DAILY 45 Tab 6   • carvedilol (COREG) 25 MG Tab Take 25 mg by mouth 2 times a day, with meals.     • amlodipine (NORVASC) 10 MG TABS TAKE 1 TABLET BY MOUTH EVERY DAY 30 Tab 6   • aspirin EC (ECOTRIN) 81 MG TBEC Take 1 Tab by mouth every day. 30 Each 11   • ezetimibe (ZETIA) 10 MG Tab Take 10 mg by mouth every day.     • furosemide (LASIX) 20 MG Tab Take 20 mg by mouth every day.     • zolpidem (AMBIEN CR) 12.5 MG CR tablet Take 1 Tab by mouth at bedtime as needed for Sleep (insomnia). (Patient not taking: Reported on 5/22/2019) 30 Tab 3   • enalapril (VASOTEC) 20 MG tablet TAKE 1 TABLET BY MOUTH TWICE A DAY (Patient not taking: Reported on 2/8/2019) 60 Tab 3     No current facility-administered medications for this visit.    \"CURRENT RX\"    REVIEW OF SYSTEMS:  Constitutional: Denies weight loss, endorses chronic daytime fatigue  Eyes: Denies vision changes  Ears/Nose/Mouth/Throat: Denies rhinitis/nasal congestion, injury, decayed teeth/toothaches.  Cardiovascular: Denies chest pain, tightness, palpitations, swelling in legs/feet, difficulty breathing when lying down but gets better when sitting up.   Respiratory: Denies shortness of breath while awake,  Sleep: per HPI  Gastrointestinal: Denies  difficulty swallowing,  heartburn.  Genitourinary: REPORTS nocturia  Musculoskeletal: Denies painful joints, sore muscles, back pain.   Neurological: Denies frequent headaches,weakness, dizziness.    PHYSICAL EXAM/VITALS:  BP (!) 160/100 (BP Location: Left arm, Patient Position: Sitting, BP Cuff Size: Adult)   Pulse (!) 105   Resp 16   Ht 1.676 m (5' 6\")   Wt 72.6 kg (160 lb)   SpO2 95%   BMI 25.82 kg/m²   Appearance: Well-nourished, well-developed,  looks stated age, no acute distress  Eyes:   EOMI  ENMT: MASKED   Neck: Supple, " trachea midline  Respiratory effort:  No intercostal retractions or use of accessory muscles  Lung auscultation:  No wheezes rhonchi rubs or rales  Cardiac: No murmurs, rubs, or gallops; regular rhythm, normal rate; no edema  Musculoskeletal:  Grossly normal; gait and station normal  Neurologic:  oriented to person, time, place, and purpose; judgement intact  Psychiatric:  No depression, anxiety, agitation    MEDICAL DECISION MAKING:  • The medical record was reviewed in its as pertains to this referral. This includes records from primary care, consultants notes,  referral request, hospital records, labs, imaging. Any available diagnostic and titration nocturnal polysomnograms, home sleep apnea tests, continuous nocturnal oximetry results, multiple sleep latency tests, and compliance reports were reviewed with the patient.    ASSESSMENT/PLAN: It is unclear whether this patient has narcolepsy type I.  It is worrisome  that he has been treated for multiple years now with very high doses of Adderall and was never tested correctly for a narcolepsy diagnosis.  The high doses of stimulants certainly have not helped his blood pressure nor his cardiac conditions.  This was explained to the patient and the patient agreed to undergo the full course of testing for narcolepsy.  Also --as we only have a record of a MSLT done in November 2006-- and even this document is not consistent with narcolepsy.  He needs to be fully tested using actigraphy, a diagnostic polysomnogram, and an MSLT.  Patient has agreed to this testing scenario.  There is no other way to determine whether or not this patient has narcolepsy type I here in [place].    1. Hypersomnolence: This patient has agreed to reduction in his stimulant dosing.  He will be prescribed 1 month of Adderall 15 mg tablets and he is allowed to take 1 tablet twice a day as needed for daytime sleepiness.  This is in essence another 50% decrease in the amount of Adderall  prescribed    - amphetamine-dextroamphetamine (ADDERALL) 15 MG tablet; Take 1 Tablet by mouth 2 times a day for 30 days. This is a 30 day supply.  Dispense: 60 Tablet; Refill: 0  - Actigraphy; Future  - Polysomnography, 4 or More; Future  - Multiple Sleep Latency Test; Future    • The risks of untreated sleep apnea were discussed with the patient at length. Patients with MAO are at increased risk of cardiovascular disease including coronary artery disease, systemic arterial hypertension, pulmonary arterial hypertension, cardiac arrhythmias, and stroke. MAO patients have an increased risk of motor vehicle accidents, type 2 diabetes, chronic kidney disease, and non-alcoholic liver disease. The patient was advised to avoid driving a motor vehicle when drowsy.  • Have advised the patient to follow up with the appropriate healthcare practitioners for all other medical problems and issues.    RETURN TO CLINIC: Return for 2 wks after discuss sleep study with any provider.  My total time spent caring for the patient on the day of the encounter was 40minutes. This includes time time spent on a thorough chart review including other physician notes, any type of sleep study, as well as critical labs and pulmonary and cardiac studies.  Additionally it includes discussions of good sleep hygiene, stimulus control and going over the need for consistency in terms of sleep preparation and practice.     Please note that this dictation was created using voice recognition software.  I have made every reasonable attempt to correct obvious errors, I expect that there are errors of grammar and possibly content that I did not discover before finalizing this note.

## 2023-10-19 ENCOUNTER — HOSPITAL ENCOUNTER (EMERGENCY)
Facility: MEDICAL CENTER | Age: 53
End: 2023-10-19
Attending: EMERGENCY MEDICINE
Payer: COMMERCIAL

## 2023-10-19 VITALS
DIASTOLIC BLOOD PRESSURE: 82 MMHG | RESPIRATION RATE: 16 BRPM | SYSTOLIC BLOOD PRESSURE: 135 MMHG | OXYGEN SATURATION: 99 % | WEIGHT: 153 LBS | BODY MASS INDEX: 24.59 KG/M2 | TEMPERATURE: 96.8 F | HEIGHT: 66 IN | HEART RATE: 90 BPM

## 2023-10-19 DIAGNOSIS — R04.0 ACUTE ANTERIOR EPISTAXIS: Primary | ICD-10-CM

## 2023-10-19 LAB
BASOPHILS # BLD AUTO: 1 % (ref 0–1.8)
BASOPHILS # BLD: 0.08 K/UL (ref 0–0.12)
EOSINOPHIL # BLD AUTO: 0.27 K/UL (ref 0–0.51)
EOSINOPHIL NFR BLD: 3.2 % (ref 0–6.9)
ERYTHROCYTE [DISTWIDTH] IN BLOOD BY AUTOMATED COUNT: 47.9 FL (ref 35.9–50)
HCT VFR BLD AUTO: 38.5 % (ref 42–52)
HGB BLD-MCNC: 12.7 G/DL (ref 14–18)
IMM GRANULOCYTES # BLD AUTO: 0.02 K/UL (ref 0–0.11)
IMM GRANULOCYTES NFR BLD AUTO: 0.2 % (ref 0–0.9)
LYMPHOCYTES # BLD AUTO: 2.59 K/UL (ref 1–4.8)
LYMPHOCYTES NFR BLD: 30.9 % (ref 22–41)
MCH RBC QN AUTO: 30.8 PG (ref 27–33)
MCHC RBC AUTO-ENTMCNC: 33 G/DL (ref 32.3–36.5)
MCV RBC AUTO: 93.2 FL (ref 81.4–97.8)
MONOCYTES # BLD AUTO: 0.51 K/UL (ref 0–0.85)
MONOCYTES NFR BLD AUTO: 6.1 % (ref 0–13.4)
NEUTROPHILS # BLD AUTO: 4.9 K/UL (ref 1.82–7.42)
NEUTROPHILS NFR BLD: 58.6 % (ref 44–72)
NRBC # BLD AUTO: 0 K/UL
NRBC BLD-RTO: 0 /100 WBC (ref 0–0.2)
PLATELET # BLD AUTO: 277 K/UL (ref 164–446)
PMV BLD AUTO: 9.4 FL (ref 9–12.9)
RBC # BLD AUTO: 4.13 M/UL (ref 4.7–6.1)
WBC # BLD AUTO: 8.4 K/UL (ref 4.8–10.8)

## 2023-10-19 PROCEDURE — 99283 EMERGENCY DEPT VISIT LOW MDM: CPT

## 2023-10-19 PROCEDURE — 700102 HCHG RX REV CODE 250 W/ 637 OVERRIDE(OP): Performed by: EMERGENCY MEDICINE

## 2023-10-19 PROCEDURE — A9270 NON-COVERED ITEM OR SERVICE: HCPCS | Performed by: EMERGENCY MEDICINE

## 2023-10-19 PROCEDURE — 36415 COLL VENOUS BLD VENIPUNCTURE: CPT

## 2023-10-19 PROCEDURE — 85025 COMPLETE CBC W/AUTO DIFF WBC: CPT

## 2023-10-19 RX ORDER — OXYMETAZOLINE HYDROCHLORIDE 0.05 G/100ML
4 SPRAY NASAL ONCE
Status: COMPLETED | OUTPATIENT
Start: 2023-10-19 | End: 2023-10-19

## 2023-10-19 RX ADMIN — OXYMETAZOLINE HCL 4 SPRAY: 0.05 SPRAY NASAL at 15:31

## 2023-10-19 ASSESSMENT — FIBROSIS 4 INDEX: FIB4 SCORE: 0.86

## 2023-10-19 NOTE — DISCHARGE INSTRUCTIONS
Use saline spray twice daily.  If bleeding spray Afrin(oxymetazoline) in bleeding nostril 4 times then place nasal clamp for 20 minutes. If still bleeding repeat, if still bleeding after that come back to ER

## 2023-10-19 NOTE — ED PROVIDER NOTES
"  ER Provider Note    Scribed for Leon Mcgraw Ii, M.d. by Madison Hill. 10/19/2023  3:10 PM    Primary Care Provider: EMI Jorgensen    CHIEF COMPLAINT  Chief Complaint   Patient presents with    Epistaxis     Intermittent x6 days. Was seen at [facility] yesterday.   Has an appt with ENT on 10/26. Has had 2 episodes today.  Concerned he's lost too much blood.   Stopped blood thinners 1.5 weeks ago. Denies ASA use.      EXTERNAL RECORDS REVIEWED      HPI/ROS  LIMITATION TO HISTORY   Select: : None  OUTSIDE HISTORIAN(S):  None    Johny Toledo is a 53 y.o. male who presents to the ED complaining of intermittent epistaxis onset six days ago. He had two episodes of epistaxis today. Patient states that he was seen at [facility] yesterday. He is concerned he is losing too much blood. Patient stopped blood thinner use 1.5 weeks ago. Patient states that it usually starts when he clears his throat. He says that he has lost 4-5 cup fulls of blood. He describes it as \"shooting out\" instead of a normal nose bleed.      He was taking warfarin but has not taken it for nearly a week now.    PAST MEDICAL HISTORY  Past Medical History:   Diagnosis Date    Abnormal electrocardiogram 7/27/2011    Bronchitis     Chronic systolic congestive heart failure (HCC) 7/27/2011    Congestive heart failure (HCC)     Dizziness 3/31/2011    Fall     Heart valve disease     Hyperlipidemia     Hypertension     Male erectile disorder 8/9/2011    Mixed hyperlipidemia 8/9/2011    Narcolepsy with cataplexy     Pulmonary embolism (HCC)     STEMI (ST elevation myocardial infarction) pk  trop 150 POBA LAD and DIAG med rx. 12/18/2010       SURGICAL HISTORY  Past Surgical History:   Procedure Laterality Date    ZZZ CARDIAC CATH  12/2010    OTHER  tonsilectomy    OTHER CARDIAC SURGERY      catherization 12/2010, 12/2008    TONSILLECTOMY AND ADENOIDECTOMY         FAMILY HISTORY  Family History   Problem Relation Age of Onset    Sleep Apnea Neg Hx  " "      SOCIAL HISTORY   reports that he quit smoking about 12 years ago. His smoking use included cigarettes. He started smoking about 27 years ago. He has a 7.5 pack-year smoking history. He has never used smokeless tobacco. He reports that he does not currently use alcohol. He reports that he does not use drugs.    CURRENT MEDICATIONS  Previous Medications    AMLODIPINE (NORVASC) 10 MG TABS    TAKE 1 TABLET BY MOUTH EVERY DAY    ASPIRIN EC (ECOTRIN) 81 MG TBEC    Take 1 Tab by mouth every day.    ATORVASTATIN (LIPITOR) 40 MG TAB    Take 40 mg by mouth.    CARVEDILOL (COREG) 25 MG TAB    Take 25 mg by mouth 2 times a day, with meals.    ENALAPRIL (VASOTEC) 20 MG TABLET    TAKE 1 TABLET BY MOUTH TWICE A DAY    ENTRESTO 49-51 MG TAB TABLET    TAKE 1 TABLET BY MOUTH TWICE A DAY**PA    EZETIMIBE (ZETIA) 10 MG TAB    Take 10 mg by mouth every day.    FUROSEMIDE (LASIX) 20 MG TAB    Take 20 mg by mouth every day.    WARFARIN (COUMADIN) 7.5 MG TAB    TAKE 1 & 1/2 TABLETS BY MOUTH ONCE DAILY    ZOLPIDEM (AMBIEN CR) 12.5 MG CR TABLET    Take 1 Tab by mouth at bedtime as needed for Sleep (insomnia).       ALLERGIES  Patient has no known allergies.    PHYSICAL EXAM  BP (!) 150/96   Pulse 93   Temp 36 °C (96.8 °F) (Temporal)   Resp 16   Ht 1.676 m (5' 6\")   Wt 69.4 kg (153 lb)   SpO2 99%   BMI 24.69 kg/m²   Physical Exam  Vitals reviewed.   HENT:      Nose:      Comments: At left nare middle septum punctate area of scab with scant bleeding  Cardiovascular:      Rate and Rhythm: Normal rate.   Skin:     Findings: No rash.   Neurological:      Mental Status: He is alert.          DIAGNOSTIC STUDIES    Labs:   Results for orders placed or performed during the hospital encounter of 10/19/23   CBC WITH DIFFERENTIAL   Result Value Ref Range    WBC 8.4 4.8 - 10.8 K/uL    RBC 4.13 (L) 4.70 - 6.10 M/uL    Hemoglobin 12.7 (L) 14.0 - 18.0 g/dL    Hematocrit 38.5 (L) 42.0 - 52.0 %    MCV 93.2 81.4 - 97.8 fL    MCH 30.8 27.0 - 33.0 " pg    MCHC 33.0 32.3 - 36.5 g/dL    RDW 47.9 35.9 - 50.0 fL    Platelet Count 277 164 - 446 K/uL    MPV 9.4 9.0 - 12.9 fL    Neutrophils-Polys 58.60 44.00 - 72.00 %    Lymphocytes 30.90 22.00 - 41.00 %    Monocytes 6.10 0.00 - 13.40 %    Eosinophils 3.20 0.00 - 6.90 %    Basophils 1.00 0.00 - 1.80 %    Immature Granulocytes 0.20 0.00 - 0.90 %    Nucleated RBC 0.00 0.00 - 0.20 /100 WBC    Neutrophils (Absolute) 4.90 1.82 - 7.42 K/uL    Lymphs (Absolute) 2.59 1.00 - 4.80 K/uL    Monos (Absolute) 0.51 0.00 - 0.85 K/uL    Eos (Absolute) 0.27 0.00 - 0.51 K/uL    Baso (Absolute) 0.08 0.00 - 0.12 K/uL    Immature Granulocytes (abs) 0.02 0.00 - 0.11 K/uL    NRBC (Absolute) 0.00 K/uL        COURSE & MEDICAL DECISION MAKING     ED Observation Status? No; Patient does not meet criteria for ED Observation.     INITIAL ASSESSMENT, COURSE AND PLAN  Care Narrative:   3:23 PM - Patient seen and examined at bedside. Patient is a 53 year old male who presents to the ED with intermittent epistaxis onset six days ago. He states that he already had two episodes today and patient is concerned that he is losing too much blood.  CBC was ordered at triage and it has resulted.  Hemoglobin 12.7.  Previous hemoglobin 2 months ago was 15.3.  He obviously is having some bleeding and we know that.  However it is not at the level that would require transfusion.  Platelet level is normal at 277.  He is not having any significant bleeding at this time but I can see the area that was bleeding and there is a small drop of bright red blood at that area.  Discussed plan of care, including using Afrin. Patient agrees to the plan of care. The patient will be medicated with Afrin 0.5% nasal spray 4 sprays.     4:07 PM  Doing well has not had any further bleeding here in ER.  Plan to discharge.  Given instructions on treating epistaxis at home.  Given guidance on when to come back to the ER.  He has a follow-up appoint with ENT in 7 days.      PROBLEM  LIST  #Epistaxis    DISPOSITION AND DISCUSSIONS    FINAL DIANGOSIS  1. Acute anterior epistaxis Active      IMadison (Scribe), am scribing for, and in the presence of, AISHA Sung II.    Electronically signed by: Madison Hill (Scribe), 10/19/2023    Leon SCANLON II, M* personally performed the services described in this documentation, as scribed by Madison Hill in my presence, and it is both accurate and complete.      The note accurately reflects work and decisions made by me.  Leon Mcgraw II, M.D.  10/19/2023  4:10 PM

## 2023-10-19 NOTE — ED TRIAGE NOTES
"Chief Complaint   Patient presents with    Epistaxis     Intermittent x6 days. Was seen at [facility] yesterday.   Has an appt with ENT on 10/26. Has had 2 episodes today.  Concerned he's lost too much blood.   Stopped blood thinners 1.5 weeks ago. Denies ASA use.      Ambulatory to triage for above.   No current bleeding. CBC ordered.     BP (!) 150/96   Pulse 93   Temp 36 °C (96.8 °F) (Temporal)   Resp 16   Ht 1.676 m (5' 6\")   Wt 69.4 kg (153 lb)   SpO2 99%   BMI 24.69 kg/m²     "

## 2023-12-01 ENCOUNTER — HOSPITAL ENCOUNTER (OUTPATIENT)
Facility: MEDICAL CENTER | Age: 53
End: 2023-12-02
Attending: EMERGENCY MEDICINE | Admitting: INTERNAL MEDICINE
Payer: COMMERCIAL

## 2023-12-01 ENCOUNTER — APPOINTMENT (OUTPATIENT)
Dept: RADIOLOGY | Facility: MEDICAL CENTER | Age: 53
End: 2023-12-01
Attending: EMERGENCY MEDICINE
Payer: COMMERCIAL

## 2023-12-01 PROBLEM — R74.8 ELEVATED CPK: Status: ACTIVE | Noted: 2023-12-01

## 2023-12-01 PROBLEM — N17.9 AKI (ACUTE KIDNEY INJURY) (HCC): Status: ACTIVE | Noted: 2023-12-01

## 2023-12-01 LAB
ALBUMIN SERPL BCP-MCNC: 4 G/DL (ref 3.2–4.9)
ALBUMIN/GLOB SERPL: 1.3 G/DL
ALP SERPL-CCNC: 88 U/L (ref 30–99)
ALT SERPL-CCNC: 43 U/L (ref 2–50)
ANION GAP SERPL CALC-SCNC: 11 MMOL/L (ref 7–16)
APPEARANCE UR: CLEAR
AST SERPL-CCNC: 55 U/L (ref 12–45)
BACTERIA #/AREA URNS HPF: NEGATIVE /HPF
BASOPHILS # BLD AUTO: 0.9 % (ref 0–1.8)
BASOPHILS # BLD: 0.1 K/UL (ref 0–0.12)
BILIRUB SERPL-MCNC: 0.4 MG/DL (ref 0.1–1.5)
BILIRUB UR QL STRIP.AUTO: NEGATIVE
BUN SERPL-MCNC: 31 MG/DL (ref 8–22)
CALCIUM ALBUM COR SERPL-MCNC: 9.1 MG/DL (ref 8.5–10.5)
CALCIUM SERPL-MCNC: 9.1 MG/DL (ref 8.5–10.5)
CHLORIDE SERPL-SCNC: 102 MMOL/L (ref 96–112)
CK SERPL-CCNC: 894 U/L (ref 0–154)
CO2 SERPL-SCNC: 25 MMOL/L (ref 20–33)
COLOR UR: YELLOW
CREAT SERPL-MCNC: 1.86 MG/DL (ref 0.5–1.4)
EOSINOPHIL # BLD AUTO: 0.24 K/UL (ref 0–0.51)
EOSINOPHIL NFR BLD: 2.2 % (ref 0–6.9)
EPI CELLS #/AREA URNS HPF: NEGATIVE /HPF
ERYTHROCYTE [DISTWIDTH] IN BLOOD BY AUTOMATED COUNT: 44.3 FL (ref 35.9–50)
GFR SERPLBLD CREATININE-BSD FMLA CKD-EPI: 43 ML/MIN/1.73 M 2
GLOBULIN SER CALC-MCNC: 3 G/DL (ref 1.9–3.5)
GLUCOSE SERPL-MCNC: 113 MG/DL (ref 65–99)
GLUCOSE UR STRIP.AUTO-MCNC: NEGATIVE MG/DL
HCT VFR BLD AUTO: 47.8 % (ref 42–52)
HGB BLD-MCNC: 15.7 G/DL (ref 14–18)
HYALINE CASTS #/AREA URNS LPF: ABNORMAL /LPF
IMM GRANULOCYTES # BLD AUTO: 0.02 K/UL (ref 0–0.11)
IMM GRANULOCYTES NFR BLD AUTO: 0.2 % (ref 0–0.9)
KETONES UR STRIP.AUTO-MCNC: NEGATIVE MG/DL
LACTATE SERPL-SCNC: 1.3 MMOL/L (ref 0.5–2)
LEUKOCYTE ESTERASE UR QL STRIP.AUTO: NEGATIVE
LYMPHOCYTES # BLD AUTO: 3.13 K/UL (ref 1–4.8)
LYMPHOCYTES NFR BLD: 28.8 % (ref 22–41)
MAGNESIUM SERPL-MCNC: 2.6 MG/DL (ref 1.5–2.5)
MCH RBC QN AUTO: 29.6 PG (ref 27–33)
MCHC RBC AUTO-ENTMCNC: 32.8 G/DL (ref 32.3–36.5)
MCV RBC AUTO: 90 FL (ref 81.4–97.8)
MICRO URNS: ABNORMAL
MONOCYTES # BLD AUTO: 0.64 K/UL (ref 0–0.85)
MONOCYTES NFR BLD AUTO: 5.9 % (ref 0–13.4)
NEUTROPHILS # BLD AUTO: 6.74 K/UL (ref 1.82–7.42)
NEUTROPHILS NFR BLD: 62 % (ref 44–72)
NITRITE UR QL STRIP.AUTO: NEGATIVE
NRBC # BLD AUTO: 0 K/UL
NRBC BLD-RTO: 0 /100 WBC (ref 0–0.2)
PH UR STRIP.AUTO: 6.5 [PH] (ref 5–8)
PLATELET # BLD AUTO: 373 K/UL (ref 164–446)
PMV BLD AUTO: 9.3 FL (ref 9–12.9)
POTASSIUM SERPL-SCNC: 5.4 MMOL/L (ref 3.6–5.5)
PROT SERPL-MCNC: 7 G/DL (ref 6–8.2)
PROT UR QL STRIP: 100 MG/DL
RBC # BLD AUTO: 5.31 M/UL (ref 4.7–6.1)
RBC # URNS HPF: ABNORMAL /HPF
RBC UR QL AUTO: NEGATIVE
SODIUM SERPL-SCNC: 138 MMOL/L (ref 135–145)
SP GR UR STRIP.AUTO: 1.01
TROPONIN T SERPL-MCNC: 15 NG/L (ref 6–19)
TROPONIN T SERPL-MCNC: 16 NG/L (ref 6–19)
UROBILINOGEN UR STRIP.AUTO-MCNC: 0.2 MG/DL
WBC # BLD AUTO: 10.9 K/UL (ref 4.8–10.8)
WBC #/AREA URNS HPF: ABNORMAL /HPF

## 2023-12-01 PROCEDURE — 99285 EMERGENCY DEPT VISIT HI MDM: CPT

## 2023-12-01 PROCEDURE — 71045 X-RAY EXAM CHEST 1 VIEW: CPT

## 2023-12-01 PROCEDURE — 36415 COLL VENOUS BLD VENIPUNCTURE: CPT

## 2023-12-01 PROCEDURE — 700105 HCHG RX REV CODE 258: Performed by: EMERGENCY MEDICINE

## 2023-12-01 PROCEDURE — 84484 ASSAY OF TROPONIN QUANT: CPT

## 2023-12-01 PROCEDURE — 82550 ASSAY OF CK (CPK): CPT

## 2023-12-01 PROCEDURE — G0378 HOSPITAL OBSERVATION PER HR: HCPCS

## 2023-12-01 PROCEDURE — 99223 1ST HOSP IP/OBS HIGH 75: CPT | Performed by: INTERNAL MEDICINE

## 2023-12-01 PROCEDURE — 93005 ELECTROCARDIOGRAM TRACING: CPT | Performed by: EMERGENCY MEDICINE

## 2023-12-01 PROCEDURE — A9270 NON-COVERED ITEM OR SERVICE: HCPCS | Performed by: INTERNAL MEDICINE

## 2023-12-01 PROCEDURE — 85025 COMPLETE CBC W/AUTO DIFF WBC: CPT

## 2023-12-01 PROCEDURE — 83605 ASSAY OF LACTIC ACID: CPT

## 2023-12-01 PROCEDURE — 700102 HCHG RX REV CODE 250 W/ 637 OVERRIDE(OP): Performed by: INTERNAL MEDICINE

## 2023-12-01 PROCEDURE — 80053 COMPREHEN METABOLIC PANEL: CPT

## 2023-12-01 PROCEDURE — 83735 ASSAY OF MAGNESIUM: CPT

## 2023-12-01 PROCEDURE — 81001 URINALYSIS AUTO W/SCOPE: CPT

## 2023-12-01 PROCEDURE — 93005 ELECTROCARDIOGRAM TRACING: CPT

## 2023-12-01 RX ORDER — CARVEDILOL 25 MG/1
25 TABLET ORAL 2 TIMES DAILY WITH MEALS
Status: DISCONTINUED | OUTPATIENT
Start: 2023-12-01 | End: 2023-12-02 | Stop reason: HOSPADM

## 2023-12-01 RX ORDER — ACETAMINOPHEN 325 MG/1
650 TABLET ORAL EVERY 6 HOURS PRN
Status: DISCONTINUED | OUTPATIENT
Start: 2023-12-01 | End: 2023-12-02 | Stop reason: HOSPADM

## 2023-12-01 RX ORDER — EZETIMIBE 10 MG/1
10 TABLET ORAL DAILY
Status: DISCONTINUED | OUTPATIENT
Start: 2023-12-01 | End: 2023-12-02 | Stop reason: HOSPADM

## 2023-12-01 RX ORDER — DEXTROAMPHETAMINE SACCHARATE, AMPHETAMINE ASPARTATE MONOHYDRATE, DEXTROAMPHETAMINE SULFATE AND AMPHETAMINE SULFATE 7.5; 7.5; 7.5; 7.5 MG/1; MG/1; MG/1; MG/1
30 CAPSULE, EXTENDED RELEASE ORAL 2 TIMES DAILY PRN
COMMUNITY

## 2023-12-01 RX ORDER — SODIUM CHLORIDE 9 MG/ML
500 INJECTION, SOLUTION INTRAVENOUS ONCE
Status: COMPLETED | OUTPATIENT
Start: 2023-12-01 | End: 2023-12-02

## 2023-12-01 RX ORDER — HYDRALAZINE HYDROCHLORIDE 20 MG/ML
10 INJECTION INTRAMUSCULAR; INTRAVENOUS EVERY 4 HOURS PRN
Status: DISCONTINUED | OUTPATIENT
Start: 2023-12-01 | End: 2023-12-02 | Stop reason: HOSPADM

## 2023-12-01 RX ORDER — SPIRONOLACTONE 25 MG/1
12.5 TABLET ORAL 2 TIMES DAILY
Status: ON HOLD | COMMUNITY
End: 2023-12-02

## 2023-12-01 RX ORDER — POLYETHYLENE GLYCOL 3350 17 G/17G
1 POWDER, FOR SOLUTION ORAL
Status: DISCONTINUED | OUTPATIENT
Start: 2023-12-01 | End: 2023-12-02 | Stop reason: HOSPADM

## 2023-12-01 RX ORDER — BISACODYL 10 MG
10 SUPPOSITORY, RECTAL RECTAL
Status: DISCONTINUED | OUTPATIENT
Start: 2023-12-01 | End: 2023-12-02 | Stop reason: HOSPADM

## 2023-12-01 RX ORDER — AMLODIPINE BESYLATE 5 MG/1
5 TABLET ORAL 2 TIMES DAILY
COMMUNITY

## 2023-12-01 RX ORDER — HEPARIN SODIUM 5000 [USP'U]/ML
5000 INJECTION, SOLUTION INTRAVENOUS; SUBCUTANEOUS EVERY 8 HOURS
Status: DISCONTINUED | OUTPATIENT
Start: 2023-12-01 | End: 2023-12-02 | Stop reason: HOSPADM

## 2023-12-01 RX ORDER — ATORVASTATIN CALCIUM 80 MG/1
80 TABLET, FILM COATED ORAL NIGHTLY
Status: ON HOLD | COMMUNITY
End: 2023-12-02

## 2023-12-01 RX ORDER — AMOXICILLIN 250 MG
2 CAPSULE ORAL 2 TIMES DAILY
Status: DISCONTINUED | OUTPATIENT
Start: 2023-12-01 | End: 2023-12-02 | Stop reason: HOSPADM

## 2023-12-01 RX ORDER — AMLODIPINE BESYLATE 5 MG/1
5 TABLET ORAL DAILY
Status: DISCONTINUED | OUTPATIENT
Start: 2023-12-01 | End: 2023-12-02 | Stop reason: HOSPADM

## 2023-12-01 RX ORDER — OXYMETAZOLINE HYDROCHLORIDE 0.05 G/100ML
2-3 SPRAY NASAL 2 TIMES DAILY PRN
COMMUNITY

## 2023-12-01 RX ADMIN — EZETIMIBE 10 MG: 10 TABLET ORAL at 16:51

## 2023-12-01 RX ADMIN — CARVEDILOL 25 MG: 25 TABLET, FILM COATED ORAL at 16:51

## 2023-12-01 RX ADMIN — AMLODIPINE BESYLATE 5 MG: 5 TABLET ORAL at 16:51

## 2023-12-01 RX ADMIN — SODIUM CHLORIDE 500 ML: 9 INJECTION, SOLUTION INTRAVENOUS at 13:56

## 2023-12-01 ASSESSMENT — ENCOUNTER SYMPTOMS
EYE PAIN: 0
COUGH: 0
SHORTNESS OF BREATH: 0
NAUSEA: 1
TINGLING: 0
FEVER: 0
SPUTUM PRODUCTION: 0
PALPITATIONS: 0
DIARRHEA: 1
BLURRED VISION: 0
ORTHOPNEA: 0
DOUBLE VISION: 0
HALLUCINATIONS: 0
HEADACHES: 0
MYALGIAS: 1
TREMORS: 0
DIZZINESS: 0
VOMITING: 1
CONSTIPATION: 0
NECK PAIN: 0
PHOTOPHOBIA: 0
WEIGHT LOSS: 0
BACK PAIN: 0
CHILLS: 0
SENSORY CHANGE: 0
SPEECH CHANGE: 0
ABDOMINAL PAIN: 0
FOCAL WEAKNESS: 0

## 2023-12-01 ASSESSMENT — PATIENT HEALTH QUESTIONNAIRE - PHQ9
2. FEELING DOWN, DEPRESSED, IRRITABLE, OR HOPELESS: NOT AT ALL
1. LITTLE INTEREST OR PLEASURE IN DOING THINGS: NOT AT ALL
SUM OF ALL RESPONSES TO PHQ9 QUESTIONS 1 AND 2: 0

## 2023-12-01 ASSESSMENT — LIFESTYLE VARIABLES
CONSUMPTION TOTAL: NEGATIVE
SUBSTANCE_ABUSE: 0
ON A TYPICAL DAY WHEN YOU DRINK ALCOHOL HOW MANY DRINKS DO YOU HAVE: 0
TOTAL SCORE: 0
TOTAL SCORE: 0
ALCOHOL_USE: NO
EVER FELT BAD OR GUILTY ABOUT YOUR DRINKING: NO
HAVE YOU EVER FELT YOU SHOULD CUT DOWN ON YOUR DRINKING: NO
TOTAL SCORE: 0
EVER HAD A DRINK FIRST THING IN THE MORNING TO STEADY YOUR NERVES TO GET RID OF A HANGOVER: NO
HOW MANY TIMES IN THE PAST YEAR HAVE YOU HAD 5 OR MORE DRINKS IN A DAY: 0
DOES PATIENT WANT TO STOP DRINKING: NO
AVERAGE NUMBER OF DAYS PER WEEK YOU HAVE A DRINK CONTAINING ALCOHOL: 0
HAVE PEOPLE ANNOYED YOU BY CRITICIZING YOUR DRINKING: NO

## 2023-12-01 ASSESSMENT — FIBROSIS 4 INDEX
FIB4 SCORE: 1.19
FIB4 SCORE: 0.99
FIB4 SCORE: 1.19

## 2023-12-01 ASSESSMENT — PAIN DESCRIPTION - PAIN TYPE
TYPE: ACUTE PAIN
TYPE: ACUTE PAIN

## 2023-12-01 ASSESSMENT — COGNITIVE AND FUNCTIONAL STATUS - GENERAL
SUGGESTED CMS G CODE MODIFIER DAILY ACTIVITY: CH
MOBILITY SCORE: 24
SUGGESTED CMS G CODE MODIFIER MOBILITY: CH
DAILY ACTIVITIY SCORE: 24

## 2023-12-01 NOTE — ASSESSMENT & PLAN NOTE
Patient found to have acute kidney injury.  Most likely due to dehydration secondary to use of diuretic as well as vomiting and diarrhea.  In ER he was started on IV fluid 500 cc  I am holding his diuretic therapy and also his Entresto due to concern for KWESI.  Continue monitor renal functions  Medication dose adjustment as per his current renal functions  I ordered CMP for tomorrow.  Admitted telemetry for close monitoring.

## 2023-12-01 NOTE — ED TRIAGE NOTES
Chief Complaint   Patient presents with    Cramping     Pt c/o complete body cramps for the last two days, pt was concerned about heart so was seen by cardiologist 2 days ago and had some medications changed including an increase in lasix pill due to water retention. Pt extensive cardiac hx.     Chest Pressure     Pt reports worsening chest pressure yesterday causing it to be difficult to catch breath.      Vitals:    12/01/23 1012   BP: 106/79   Pulse: 91   Resp: 18   Temp: 36.1 °C (97 °F)   SpO2: 96%     Pt ambulatory to triage w/ above complaint.   Protocol ordered. EKG completed, labs to be collected by phlebotomy.   Placed pt back in lobby, educated on NPO status.

## 2023-12-01 NOTE — ED NOTES
Med rec completed per patient at bedside and patient's pharmacy, [facility] on [address].  Allergies reviewed with patient. NKDA.    Outpatient antibiotics within the last 30 days: NONE.    ANTICOAGULATION: NONE.    Patient states that he was told to increase his furosemide from furosemide 20 mg x 1 tablet per day to furosemide 20 mg x 2 tablets per day as of 2 days ago (11/29/2023).    Joo Watkins, PhT

## 2023-12-01 NOTE — ED PROVIDER NOTES
ED Provider Note    CHIEF COMPLAINT  Chief Complaint   Patient presents with    Cramping     Pt c/o complete body cramps for the last two days, pt was concerned about heart so was seen by cardiologist 2 days ago and had some medications changed including an increase in lasix pill due to water retention. Pt extensive cardiac hx.     Chest Pressure     Pt reports worsening chest pressure yesterday causing it to be difficult to catch breath.        EXTERNAL RECORDS REVIEWED  Outpatient Notes Seen at cardiology office 11/28. Has a history of muscle pain, CAD, cardiomyopathy, and CHF.    HPI/ROS  LIMITATION TO HISTORY   Select: : None  OUTSIDE HISTORIAN(S):  None    Johny Toledo is a 53 y.o. male who presents with a chief complaint of cramping that started 2 to 3 days ago after his medications were adjusted.  He at that time, he felt as though he was having a heart failure exacerbation so was seen by his cardiologist, Dr. Maharaj.  She initiated him on spironolactone and increased his dose of Lasix which she has been taking as prescribed.  That night he had an episode of nonbloody emesis and then started to develop all over body cramps.  His legs were cramping the worst but he was also having cramping in his hands as well as his rib cage and his chest.  It was difficult for him to determine if he was having chest pain or just cramping.  Last night he discontinued the medications but the symptoms have persisted.  He initiated potassium supplementation on his own which has not been helpful.  He denies any dysuria.  He has a history of cramping that he says is typically relieved if he drinks a bottle of water.    PAST MEDICAL HISTORY   has a past medical history of Abnormal electrocardiogram (7/27/2011), Bronchitis, Chronic systolic congestive heart failure (HCC) (7/27/2011), Congestive heart failure (HCC), Dizziness (3/31/2011), Fall, Heart valve disease, Hyperlipidemia, Hypertension, Male erectile disorder  "(2011), Mixed hyperlipidemia (2011), Narcolepsy with cataplexy, Pulmonary embolism (HCC), and STEMI (ST elevation myocardial infarction) pk  trop 150 POBA LAD and DIAG med rx. (2010).    SURGICAL HISTORY   has a past surgical history that includes other (tonsilectomy); other cardiac surgery; zzz cardiac cath (2010); and tonsillectomy and adenoidectomy.    FAMILY HISTORY  Family History   Problem Relation Age of Onset    Sleep Apnea Neg Hx        SOCIAL HISTORY  Social History     Tobacco Use    Smoking status: Former     Current packs/day: 0.00     Average packs/day: 0.5 packs/day for 15.0 years (7.5 ttl pk-yrs)     Types: Cigarettes     Start date: 1995     Quit date: 2010     Years since quittin.0    Smokeless tobacco: Never    Tobacco comments:     1/2 pack/day   Vaping Use    Vaping Use: Never used   Substance and Sexual Activity    Alcohol use: Not Currently    Drug use: No    Sexual activity: Not on file       CURRENT MEDICATIONS  Home Medications       Reviewed by Omaira Christy R.N. (Registered Nurse) on 23 at 1103  Med List Status: Partial     Medication Last Dose Status   amlodipine (NORVASC) 10 MG TABS  Active   aspirin EC (ECOTRIN) 81 MG TBEC  Active   atorvastatin (LIPITOR) 40 MG Tab  Active   carvedilol (COREG) 25 MG Tab  Active   enalapril (VASOTEC) 20 MG tablet  Active   ENTRESTO 49-51 MG Tab tablet  Active   ezetimibe (ZETIA) 10 MG Tab  Active   furosemide (LASIX) 20 MG Tab  Active   warfarin (COUMADIN) 7.5 MG Tab  Active   zolpidem (AMBIEN CR) 12.5 MG CR tablet  Active                    ALLERGIES  No Known Allergies    PHYSICAL EXAM  VITAL SIGNS: /83   Pulse 93   Temp 36.1 °C (97 °F) (Temporal)   Resp 16   Ht 1.676 m (5' 6\")   Wt 67.6 kg (149 lb 0.5 oz)   SpO2 96%   BMI 24.05 kg/m²    Physical Exam  Vitals and nursing note reviewed.   Constitutional:       Appearance: Normal appearance.   HENT:      Head: Normocephalic and atraumatic.      Right " Ear: External ear normal.      Left Ear: External ear normal.      Nose: Nose normal.      Mouth/Throat:      Mouth: Mucous membranes are dry.   Eyes:      Extraocular Movements: Extraocular movements intact.      Conjunctiva/sclera: Conjunctivae normal.      Pupils: Pupils are equal, round, and reactive to light.   Cardiovascular:      Rate and Rhythm: Normal rate and regular rhythm.   Pulmonary:      Effort: Pulmonary effort is normal.      Breath sounds: Normal breath sounds.   Abdominal:      Palpations: Abdomen is soft.      Tenderness: There is no abdominal tenderness.   Musculoskeletal:         General: Normal range of motion.      Cervical back: Normal range of motion and neck supple.      Right lower leg: No edema.      Left lower leg: No edema.   Skin:     General: Skin is warm and dry.   Neurological:      General: No focal deficit present.      Mental Status: He is alert and oriented to person, place, and time.   Psychiatric:         Mood and Affect: Mood normal.         Behavior: Behavior normal.       DIAGNOSTIC STUDIES / PROCEDURES  LABS  Results for orders placed or performed during the hospital encounter of 12/01/23   CBC with Differential   Result Value Ref Range    WBC 10.9 (H) 4.8 - 10.8 K/uL    RBC 5.31 4.70 - 6.10 M/uL    Hemoglobin 15.7 14.0 - 18.0 g/dL    Hematocrit 47.8 42.0 - 52.0 %    MCV 90.0 81.4 - 97.8 fL    MCH 29.6 27.0 - 33.0 pg    MCHC 32.8 32.3 - 36.5 g/dL    RDW 44.3 35.9 - 50.0 fL    Platelet Count 373 164 - 446 K/uL    MPV 9.3 9.0 - 12.9 fL    Neutrophils-Polys 62.00 44.00 - 72.00 %    Lymphocytes 28.80 22.00 - 41.00 %    Monocytes 5.90 0.00 - 13.40 %    Eosinophils 2.20 0.00 - 6.90 %    Basophils 0.90 0.00 - 1.80 %    Immature Granulocytes 0.20 0.00 - 0.90 %    Nucleated RBC 0.00 0.00 - 0.20 /100 WBC    Neutrophils (Absolute) 6.74 1.82 - 7.42 K/uL    Lymphs (Absolute) 3.13 1.00 - 4.80 K/uL    Monos (Absolute) 0.64 0.00 - 0.85 K/uL    Eos (Absolute) 0.24 0.00 - 0.51 K/uL    Marcus  (Absolute) 0.10 0.00 - 0.12 K/uL    Immature Granulocytes (abs) 0.02 0.00 - 0.11 K/uL    NRBC (Absolute) 0.00 K/uL   Complete Metabolic Panel (CMP)   Result Value Ref Range    Sodium 138 135 - 145 mmol/L    Potassium 5.4 3.6 - 5.5 mmol/L    Chloride 102 96 - 112 mmol/L    Co2 25 20 - 33 mmol/L    Anion Gap 11.0 7.0 - 16.0    Glucose 113 (H) 65 - 99 mg/dL    Bun 31 (H) 8 - 22 mg/dL    Creatinine 1.86 (H) 0.50 - 1.40 mg/dL    Calcium 9.1 8.5 - 10.5 mg/dL    Correct Calcium 9.1 8.5 - 10.5 mg/dL    AST(SGOT) 55 (H) 12 - 45 U/L    ALT(SGPT) 43 2 - 50 U/L    Alkaline Phosphatase 88 30 - 99 U/L    Total Bilirubin 0.4 0.1 - 1.5 mg/dL    Albumin 4.0 3.2 - 4.9 g/dL    Total Protein 7.0 6.0 - 8.2 g/dL    Globulin 3.0 1.9 - 3.5 g/dL    A-G Ratio 1.3 g/dL   Troponins NOW   Result Value Ref Range    Troponin T 16 6 - 19 ng/L   Troponins in two (2) hours   Result Value Ref Range    Troponin T 15 6 - 19 ng/L   ESTIMATED GFR   Result Value Ref Range    GFR (CKD-EPI) 43 (A) >60 mL/min/1.73 m 2   LACTIC ACID   Result Value Ref Range    Lactic Acid 1.3 0.5 - 2.0 mmol/L   MAGNESIUM   Result Value Ref Range    Magnesium 2.6 (H) 1.5 - 2.5 mg/dL   CREATINE KINASE   Result Value Ref Range    CPK Total 894 (H) 0 - 154 U/L   URINALYSIS,CULTURE IF INDICATED    Specimen: Urine   Result Value Ref Range    Color Yellow     Character Clear     Specific Gravity 1.013 <1.035    Ph 6.5 5.0 - 8.0    Glucose Negative Negative mg/dL    Ketones Negative Negative mg/dL    Protein 100 (A) Negative mg/dL    Bilirubin Negative Negative    Urobilinogen, Urine 0.2 Negative    Nitrite Negative Negative    Leukocyte Esterase Negative Negative    Occult Blood Negative Negative    Micro Urine Req Microscopic    URINE MICROSCOPIC (W/UA)   Result Value Ref Range    WBC 0-2 (A) /hpf    RBC 0-2 (A) /hpf    Bacteria Negative None /hpf    Epithelial Cells Negative /hpf    Hyaline Cast 0-2 /lpf   CBC with Differential   Result Value Ref Range    WBC 10.8 4.8 - 10.8 K/uL     RBC 4.68 (L) 4.70 - 6.10 M/uL    Hemoglobin 13.7 (L) 14.0 - 18.0 g/dL    Hematocrit 42.5 42.0 - 52.0 %    MCV 90.8 81.4 - 97.8 fL    MCH 29.3 27.0 - 33.0 pg    MCHC 32.2 (L) 32.3 - 36.5 g/dL    RDW 45.0 35.9 - 50.0 fL    Platelet Count 317 164 - 446 K/uL    MPV 9.4 9.0 - 12.9 fL    Neutrophils-Polys 58.20 44.00 - 72.00 %    Lymphocytes 31.40 22.00 - 41.00 %    Monocytes 6.40 0.00 - 13.40 %    Eosinophils 2.90 0.00 - 6.90 %    Basophils 0.80 0.00 - 1.80 %    Immature Granulocytes 0.30 0.00 - 0.90 %    Nucleated RBC 0.00 0.00 - 0.20 /100 WBC    Neutrophils (Absolute) 6.28 1.82 - 7.42 K/uL    Lymphs (Absolute) 3.39 1.00 - 4.80 K/uL    Monos (Absolute) 0.69 0.00 - 0.85 K/uL    Eos (Absolute) 0.31 0.00 - 0.51 K/uL    Baso (Absolute) 0.09 0.00 - 0.12 K/uL    Immature Granulocytes (abs) 0.03 0.00 - 0.11 K/uL    NRBC (Absolute) 0.00 K/uL   Comp Metabolic Panel (CMP)   Result Value Ref Range    Sodium 140 135 - 145 mmol/L    Potassium 4.3 3.6 - 5.5 mmol/L    Chloride 106 96 - 112 mmol/L    Co2 24 20 - 33 mmol/L    Anion Gap 10.0 7.0 - 16.0    Glucose 106 (H) 65 - 99 mg/dL    Bun 33 (H) 8 - 22 mg/dL    Creatinine 1.72 (H) 0.50 - 1.40 mg/dL    Calcium 8.5 8.5 - 10.5 mg/dL    Correct Calcium 9.1 8.5 - 10.5 mg/dL    AST(SGOT) 35 12 - 45 U/L    ALT(SGPT) 33 2 - 50 U/L    Alkaline Phosphatase 74 30 - 99 U/L    Total Bilirubin 0.3 0.1 - 1.5 mg/dL    Albumin 3.3 3.2 - 4.9 g/dL    Total Protein 5.9 (L) 6.0 - 8.2 g/dL    Globulin 2.6 1.9 - 3.5 g/dL    A-G Ratio 1.3 g/dL   CREATINE KINASE   Result Value Ref Range    CPK Total 432 (H) 0 - 154 U/L   MAGNESIUM   Result Value Ref Range    Magnesium 2.4 1.5 - 2.5 mg/dL   ESTIMATED GFR   Result Value Ref Range    GFR (CKD-EPI) 47 (A) >60 mL/min/1.73 m 2   EKG   Result Value Ref Range    Report       [facility]    Test Date:  2023-12-01  Pt Name:    LAVELL SPRINGER                 Department: ER  MRN:        [id number]                      Room:  Gender:      Male                         Technician: [id number]  :        1970                   Requested By:ER TRIAGE PROTOCOL  Order #:    [id number]                    Reading MD:    Measurements  Intervals                                Axis  Rate:       92                           P:          64  KY:         167                          QRS:        -1  QRSD:       109                          T:          127  QT:         398  QTc:        493    Interpretive Statements  Sinus rhythm  Left atrial enlargement  LVH with secondary repolarization abnormality  Borderline prolonged QT interval  Compared to ECG 2022 03:46:48  Atrial abnormality now present  Left ventricular hypertrophy now present  Early repolarization now present  Myocardial infarct finding no longer present  T-wave abnormality no long er present       RADIOLOGY  I have independently interpreted the diagnostic imaging associated with this visit and am waiting the final reading from the radiologist.   My preliminary interpretation is as follows: No pulmonary edema    Radiologist interpretation:   DX-CHEST-PORTABLE (1 VIEW)   Final Result      No acute cardiopulmonary abnormality.        COURSE & MEDICAL DECISION MAKING    ED Observation Status? No; Patient does not meet criteria for ED Observation.     INITIAL ASSESSMENT, COURSE AND PLAN  Care Narrative: This is a 53 year old male with an extensive cardiac history who is here with severe muscle cramping making it difficult for him to use his hands or even ambulate in the setting of recently increased diuretics.    DDx includes, but is not limited to, electrolyte abnormality, dehydration, rhabdomyolysis.    Arrives AF with normal VS. Appears slightly dehydrated with dry mucous membranes but non-toxic. Exam is largely normal although his hands are intermittently cramping.    He does have an elevated creatinine with a CPK of 894 likely due to over diuresis. He was very gently hydrated. Potassium and calcium  are normal. Magnesium is slightly elevated to 2.6.    Patient will benefit from hospitalization for additional management. Discussed with the hospitalist and admitted in guarded condition.    HYDRATION: Based on the patient's presentation of Dehydration the patient was given IV fluids. IV Hydration was used because oral hydration was not adequate alone. Upon recheck following hydration, the patient was unchanged.    ADDITIONAL PROBLEM LIST  None  DISPOSITION AND DISCUSSIONS  I have discussed management of the patient with the following physicians and RUMA's:  Dr. Alba, hospitalist.    Discussion of management with other [facility] or appropriate source(s): None     Escalation of care considered, and ultimately not performed: N/A.    Barriers to care at this time, including but not limited to:  None .     Decision tools and prescription drugs considered including, but not limited to: N/A.    FINAL DIAGNOSIS  KWESI  Dehydration  Elevated CPK    Electronically signed by: Cosme Haque M.D., 12/1/2023 12:19 PM

## 2023-12-01 NOTE — H&P
Hospital Medicine History & Physical Note    Date of Service  12/1/2023    Primary Care Physician  EMI Jorgensen    Consultants  None    Specialist Names: N/A    Code Status  Prior    Chief Complaint  Chief Complaint   Patient presents with    Cramping     Pt c/o complete body cramps for the last two days, pt was concerned about heart so was seen by cardiologist 2 days ago and had some medications changed including an increase in lasix pill due to water retention. Pt extensive cardiac hx.     Chest Pressure     Pt reports worsening chest pressure yesterday causing it to be difficult to catch breath.        History of Presenting Illness    Johny Toledo is a pleasant 53 y.o. male with possible history of ischemic cardiomyopathy with EF of 10 to 15% around 2008 and now improved to 40% an echocardiogram that was performed in September 2022.  S/p dual-chamber AICD, coronary artery disease and acute anterior anterolateral MI in 2010 s/p angioplasty of the LAD who presented to the hospital on 12/1/2023 with complaint of muscle cramping.  Patient reported that 2 days ago on November 28, 2023 he went to his cardiologist office and they increased the dose of his Lasix due to shortness of breath and also recommended to start him on spironolactone 12.5 mg daily.  Patient took this medication day before yesterday and instead of taking half pill of spironolactone 12.5 mg he took a whole pill of 25 mg of spironolactone.  Patient developed symptoms of muscle cramps and significant weakness he is unable to walk due to severe symptoms of weakness and muscle cramp.  Last night patient's girlfriend brought him Gatorade electrolytes and he has been drinking a overnight that slightly helped and his symptoms of muscle cramps.  Due to his significant weakness he did not take his medication last night.  He denies complaint of acute shortness of breath.  He also reported that 2 days ago he had episode of vomiting and this  "morning he had a large loose bowel movement.  There is no other specific aggravating or elevating factors.  There is no other associated symptoms.    ER course: Patient found to have acute kidney injury with elevated kidney functions from his baseline.  He also found to have elevated CPK.  He was started on IV fluid due to concern for possible dehydration in the setting of diuretic use and diarrhea.    I discussed about this admission with ER physician Dr. Haque.        I reviewed cardiology note from care everywhere access that showed    \"Johny Toledo is a 53 y.o. male with a cardiomyopathy with EF of 10 to 15% diagnosed around 2008 now up to 40% on echocardiogram September 2022, dual-chamber AICD implantation, coronary artery disease with acute anterior and anterolateral MI in 2010 status post angioplasty of the LAD and diagonal branch, hypertension, dyslipidemia, and impaired fasting glucose who is here for follow-up of his cardiac status\"     I discussed the plan of care with patient.    Review of Systems  Review of Systems   Constitutional:  Positive for malaise/fatigue. Negative for chills, fever and weight loss.   HENT:  Negative for hearing loss and tinnitus.    Eyes:  Negative for blurred vision, double vision, photophobia and pain.   Respiratory:  Negative for cough, sputum production and shortness of breath.    Cardiovascular:  Negative for chest pain, palpitations, orthopnea and leg swelling.   Gastrointestinal:  Positive for diarrhea, nausea and vomiting. Negative for abdominal pain and constipation.   Genitourinary:  Negative for dysuria, frequency and urgency.   Musculoskeletal:  Positive for myalgias. Negative for back pain, joint pain and neck pain.   Skin:  Negative for rash.   Neurological:  Negative for dizziness, tingling, tremors, sensory change, speech change, focal weakness and headaches.   Psychiatric/Behavioral:  Negative for hallucinations and substance abuse.    All other systems " reviewed and are negative.      Past Medical History   has a past medical history of Abnormal electrocardiogram (7/27/2011), Bronchitis, Chronic systolic congestive heart failure (HCC) (7/27/2011), Congestive heart failure (HCC), Dizziness (3/31/2011), Fall, Heart valve disease, Hyperlipidemia, Hypertension, Male erectile disorder (8/9/2011), Mixed hyperlipidemia (8/9/2011), Narcolepsy with cataplexy, Pulmonary embolism (HCC), and STEMI (ST elevation myocardial infarction) pk  trop 150 POBA LAD and DIAG med rx. (12/18/2010).    Surgical History   has a past surgical history that includes other (tonsilectomy); other cardiac surgery; zzz cardiac cath (12/2010); and tonsillectomy and adenoidectomy.     Family History     Family history reviewed with patient. There is no family history that is pertinent to the chief complaint.     Social History   reports that he quit smoking about 13 years ago. His smoking use included cigarettes. He started smoking about 28 years ago. He has a 7.5 pack-year smoking history. He has never used smokeless tobacco. He reports that he does not currently use alcohol. He reports that he does not use drugs.    Allergies  No Known Allergies    Medications  Prior to Admission Medications   Prescriptions Last Dose Informant Patient Reported? Taking?   ENTRESTO 49-51 MG Tab tablet 11/29/2023 at PM Patient, Patient's Home Pharmacy Yes No   Sig: Take 1 Tablet by mouth 2 times a day.   amLODIPine (NORVASC) 5 MG Tab 11/29/2023 at PM Patient, Patient's Home Pharmacy Yes Yes   Sig: Take 5 mg by mouth 2 times a day.   amphetamine-dextroamphetamine ER (ADDERALL XR, 30MG,) 30 MG XR capsule ABOUT 1 WEEK AGO at PRN Patient, Patient's Home Pharmacy Yes Yes   Sig: Take 30 mg by mouth 2 times a day as needed. Indications: Attention Deficit Hyperactivity Disorder   atorvastatin (LIPITOR) 80 MG tablet 11/29/2023 at PM Patient, Patient's Home Pharmacy Yes Yes   Sig: Take 80 mg by mouth every evening.   carvedilol  (COREG) 25 MG Tab 11/29/2023 at PM Patient, Patient's Home Pharmacy Yes No   Sig: Take 25 mg by mouth 2 times a day, with meals.   ezetimibe (ZETIA) 10 MG Tab 11/29/2023 at PM Patient, Patient's Home Pharmacy Yes No   Sig: Take 10 mg by mouth every day.   furosemide (LASIX) 20 MG Tab 11/29/2023 at PM Patient Yes No   Sig: Take 20 mg by mouth every day. 2 tablets = 40 mg.   oxymetazoline (AFRIN 12 HOUR) 0.05 % Solution 12/1/2023 at AM Patient Yes Yes   Sig: Administer 2-3 Sprays into affected nostril(S) 2 times a day as needed for Congestion.   spironolactone (ALDACTONE) 25 MG Tab 11/29/2023 at PM Patient, Patient's Home Pharmacy Yes Yes   Sig: Take 12.5 mg by mouth 2 times a day. 0.5 tablet = 12.5 mg.      Facility-Administered Medications: None       Physical Exam  Temp:  [36.1 °C (97 °F)] 36.1 °C (97 °F)  Pulse:  [89-93] 90  Resp:  [16-20] 16  BP: (106-128)/(79-89) 128/89  SpO2:  [91 %-96 %] 92 %  Blood Pressure: 128/89   Temperature: 36.1 °C (97 °F)   Pulse: 90   Respiration: 16   Pulse Oximetry: 92 %       Physical Exam  Vitals reviewed.   Constitutional:       General: He is not in acute distress.  HENT:      Head: Normocephalic and atraumatic. No contusion.      Right Ear: External ear normal.      Left Ear: External ear normal.      Nose: Nose normal.      Mouth/Throat:      Pharynx: No oropharyngeal exudate.   Eyes:      General:         Right eye: No discharge.         Left eye: No discharge.      Pupils: Pupils are equal, round, and reactive to light.   Cardiovascular:      Rate and Rhythm: Normal rate and regular rhythm.      Heart sounds: No murmur heard.     No friction rub. No gallop.   Pulmonary:      Effort: Pulmonary effort is normal.      Breath sounds: No wheezing or rhonchi.   Abdominal:      General: Bowel sounds are normal. There is no distension.      Palpations: Abdomen is soft.      Tenderness: There is no abdominal tenderness. There is no rebound.   Musculoskeletal:         General: No  "swelling or tenderness. Normal range of motion.      Cervical back: No rigidity. No muscular tenderness.   Skin:     General: Skin is warm and dry.      Coloration: Skin is not jaundiced.   Neurological:      General: No focal deficit present.      Mental Status: He is alert and oriented to person, place, and time.      Cranial Nerves: No cranial nerve deficit.      Sensory: No sensory deficit.      Comments: He is following commands and moving all his extremities   Psychiatric:         Mood and Affect: Mood normal.         Laboratory:  Recent Labs     12/01/23  1110   WBC 10.9*   RBC 5.31   HEMOGLOBIN 15.7   HEMATOCRIT 47.8   MCV 90.0   MCH 29.6   MCHC 32.8   RDW 44.3   PLATELETCT 373   MPV 9.3     Recent Labs     12/01/23  1110   SODIUM 138   POTASSIUM 5.4   CHLORIDE 102   CO2 25   GLUCOSE 113*   BUN 31*   CREATININE 1.86*   CALCIUM 9.1     Recent Labs     12/01/23  1110   ALTSGPT 43   ASTSGOT 55*   ALKPHOSPHAT 88   TBILIRUBIN 0.4   GLUCOSE 113*         No results for input(s): \"NTPROBNP\" in the last 72 hours.      Recent Labs     12/01/23  1110 12/01/23  1250   TROPONINT 16 15       Imaging:  DX-CHEST-PORTABLE (1 VIEW)   Final Result      No acute cardiopulmonary abnormality.          X-Ray:  My impression is: Chest x-ray to interpretation did not show any acute normalities.    Assessment/Plan:  Justification for Admission Status  I anticipate this patient is appropriate for observation status at this time because as patient will need IV fluid for now and once his renal function improved including CPK then will initiate Lasix.    Patient will need a Telemetry bed on EMERGENCY service .  The need is secondary to KWESI.    * KWESI (acute kidney injury) (HCC)- (present on admission)  Assessment & Plan  Patient found to have acute kidney injury.  Most likely due to dehydration secondary to use of diuretic as well as vomiting and diarrhea.  In ER he was started on IV fluid 500 cc  I am holding his diuretic therapy and " also his Entresto due to concern for KWESI.  Continue monitor renal functions  Medication dose adjustment as per his current renal functions  I ordered CMP for tomorrow.  Admitted telemetry for close monitoring.      Elevated CPK  Assessment & Plan  Patient found to have elevated CPK.  Started him on IV fluid  Ordered CPK level for tomorrow.    Chronic systolic congestive heart failure (HCC)- (present on admission)  Assessment & Plan  Patient has a history of chronic systolic congestive heart failure.  I am holding diuretic therapy due to dehydration and KWESI.  Once his renal function will improve plan is to initiate Lasix and see its effects.  Admit to telemetry.    HTN (hypertension)- (present on admission)  Assessment & Plan  I am continuing his outpatient amlodipine and carvedilol.    STEMI (ST elevation myocardial infarction) pk  trop 150 POBA LAD and DIAG med rx.- (present on admission)  Assessment & Plan  History of  No symptoms of acute chest pain.      I reviewed outside cardiology note from November 28, 2023 from Dr. Maharaj    I personally discussed about this admission with ER physician Dr. Haque.    VTE prophylaxis: heparin ppx

## 2023-12-01 NOTE — ASSESSMENT & PLAN NOTE
Patient has a history of chronic systolic congestive heart failure.  I am holding diuretic therapy due to dehydration and KWESI.  Once his renal function will improve plan is to initiate Lasix and see its effects.  Admit to telemetry.

## 2023-12-02 VITALS
SYSTOLIC BLOOD PRESSURE: 117 MMHG | HEIGHT: 66 IN | RESPIRATION RATE: 16 BRPM | DIASTOLIC BLOOD PRESSURE: 82 MMHG | TEMPERATURE: 97.5 F | BODY MASS INDEX: 25.01 KG/M2 | WEIGHT: 155.65 LBS | OXYGEN SATURATION: 96 % | HEART RATE: 80 BPM

## 2023-12-02 LAB
ALBUMIN SERPL BCP-MCNC: 3.3 G/DL (ref 3.2–4.9)
ALBUMIN/GLOB SERPL: 1.3 G/DL
ALP SERPL-CCNC: 74 U/L (ref 30–99)
ALT SERPL-CCNC: 33 U/L (ref 2–50)
ANION GAP SERPL CALC-SCNC: 10 MMOL/L (ref 7–16)
AST SERPL-CCNC: 35 U/L (ref 12–45)
BASOPHILS # BLD AUTO: 0.8 % (ref 0–1.8)
BASOPHILS # BLD: 0.09 K/UL (ref 0–0.12)
BILIRUB SERPL-MCNC: 0.3 MG/DL (ref 0.1–1.5)
BUN SERPL-MCNC: 33 MG/DL (ref 8–22)
CALCIUM ALBUM COR SERPL-MCNC: 9.1 MG/DL (ref 8.5–10.5)
CALCIUM SERPL-MCNC: 8.5 MG/DL (ref 8.5–10.5)
CHLORIDE SERPL-SCNC: 106 MMOL/L (ref 96–112)
CK SERPL-CCNC: 432 U/L (ref 0–154)
CO2 SERPL-SCNC: 24 MMOL/L (ref 20–33)
CREAT SERPL-MCNC: 1.72 MG/DL (ref 0.5–1.4)
EOSINOPHIL # BLD AUTO: 0.31 K/UL (ref 0–0.51)
EOSINOPHIL NFR BLD: 2.9 % (ref 0–6.9)
ERYTHROCYTE [DISTWIDTH] IN BLOOD BY AUTOMATED COUNT: 45 FL (ref 35.9–50)
GFR SERPLBLD CREATININE-BSD FMLA CKD-EPI: 47 ML/MIN/1.73 M 2
GLOBULIN SER CALC-MCNC: 2.6 G/DL (ref 1.9–3.5)
GLUCOSE SERPL-MCNC: 106 MG/DL (ref 65–99)
HCT VFR BLD AUTO: 42.5 % (ref 42–52)
HGB BLD-MCNC: 13.7 G/DL (ref 14–18)
IMM GRANULOCYTES # BLD AUTO: 0.03 K/UL (ref 0–0.11)
IMM GRANULOCYTES NFR BLD AUTO: 0.3 % (ref 0–0.9)
LYMPHOCYTES # BLD AUTO: 3.39 K/UL (ref 1–4.8)
LYMPHOCYTES NFR BLD: 31.4 % (ref 22–41)
MAGNESIUM SERPL-MCNC: 2.4 MG/DL (ref 1.5–2.5)
MCH RBC QN AUTO: 29.3 PG (ref 27–33)
MCHC RBC AUTO-ENTMCNC: 32.2 G/DL (ref 32.3–36.5)
MCV RBC AUTO: 90.8 FL (ref 81.4–97.8)
MONOCYTES # BLD AUTO: 0.69 K/UL (ref 0–0.85)
MONOCYTES NFR BLD AUTO: 6.4 % (ref 0–13.4)
NEUTROPHILS # BLD AUTO: 6.28 K/UL (ref 1.82–7.42)
NEUTROPHILS NFR BLD: 58.2 % (ref 44–72)
NRBC # BLD AUTO: 0 K/UL
NRBC BLD-RTO: 0 /100 WBC (ref 0–0.2)
PLATELET # BLD AUTO: 317 K/UL (ref 164–446)
PMV BLD AUTO: 9.4 FL (ref 9–12.9)
POTASSIUM SERPL-SCNC: 4.3 MMOL/L (ref 3.6–5.5)
PROT SERPL-MCNC: 5.9 G/DL (ref 6–8.2)
RBC # BLD AUTO: 4.68 M/UL (ref 4.7–6.1)
SODIUM SERPL-SCNC: 140 MMOL/L (ref 135–145)
WBC # BLD AUTO: 10.8 K/UL (ref 4.8–10.8)

## 2023-12-02 PROCEDURE — 700102 HCHG RX REV CODE 250 W/ 637 OVERRIDE(OP): Performed by: INTERNAL MEDICINE

## 2023-12-02 PROCEDURE — 83735 ASSAY OF MAGNESIUM: CPT

## 2023-12-02 PROCEDURE — 82550 ASSAY OF CK (CPK): CPT

## 2023-12-02 PROCEDURE — 85025 COMPLETE CBC W/AUTO DIFF WBC: CPT

## 2023-12-02 PROCEDURE — 99239 HOSP IP/OBS DSCHRG MGMT >30: CPT | Performed by: INTERNAL MEDICINE

## 2023-12-02 PROCEDURE — A9270 NON-COVERED ITEM OR SERVICE: HCPCS | Performed by: INTERNAL MEDICINE

## 2023-12-02 PROCEDURE — G0378 HOSPITAL OBSERVATION PER HR: HCPCS

## 2023-12-02 PROCEDURE — 80053 COMPREHEN METABOLIC PANEL: CPT

## 2023-12-02 RX ADMIN — EZETIMIBE 10 MG: 10 TABLET ORAL at 05:33

## 2023-12-02 RX ADMIN — CARVEDILOL 25 MG: 25 TABLET, FILM COATED ORAL at 08:40

## 2023-12-02 RX ADMIN — AMLODIPINE BESYLATE 5 MG: 5 TABLET ORAL at 05:32

## 2023-12-02 ASSESSMENT — FIBROSIS 4 INDEX: FIB4 SCORE: 1.02

## 2023-12-02 NOTE — PROGRESS NOTES
Received bedside report from RN, pt care assumed, VSS, pt assessment complete. Pt AAOx4, on room air. No signs of acute distress noted at this time. Plan of care discussed with pt and verbalizes no questions. Pt denies any additional needs at this time. Bed locked/in lowest position, pt educated on fall risk and verbalized understanding, call light within reach, hourly rounding initiated.

## 2023-12-02 NOTE — CARE PLAN
The patient is Stable - Low risk of patient condition declining or worsening    Shift Goals  Clinical Goals: monitor labs, hemodynamic stability  Patient Goals: updates, rest  Family Goals: updates    Progress made toward(s) clinical / shift goals:    Problem: Knowledge Deficit - Standard  Goal: Patient and family/care givers will demonstrate understanding of plan of care, disease process/condition, diagnostic tests and medications  Outcome: Progressing       Patient is not progressing towards the following goals:

## 2023-12-02 NOTE — CARE PLAN
The patient is Stable - Low risk of patient condition declining or worsening    Shift Goals  Clinical Goals: Monitor labs/vitals  Patient Goals: updates on POC  Family Goals: updates    Progress made toward(s) clinical / shift goals:      Problem: Knowledge Deficit - Standard  Goal: Patient and family/care givers will demonstrate understanding of plan of care, disease process/condition, diagnostic tests and medications  Outcome: Progressing       Patient is not progressing towards the following goals:

## 2023-12-02 NOTE — PROGRESS NOTES
Patient brought to tele 7 via ACLS RN. Patient A &Ox4. No report of pain at this time. Patient ambulatory to bed and tolerated activity well. Patient on room air, saturations 96%, vital signs stable. Patient placed on tele monitor, monitor room notified. Patient oriented to room and unit. Bed is locked in lowest position. Call light and belongings within reach.

## 2023-12-02 NOTE — DISCHARGE INSTRUCTIONS
Discharge Instructions per Brandy Solano M.D.    Mr. Toledo,    You were admitted with an acute kidney injury related to likely some of your new medications (increased furosemide/lasix and spironolactone).  Once we held those medications (and Entresto) your kidney function improved.  I would continue to hold these medications (lasix, spironolactone and Entresto) for a few more days then can restart Entresto then likely can restart lasix (20mg daily) and spironolactone if you develop swelling, shortness of breath or increased weight (more than 3lbs in a day or 5lbs in a week).  Please follow up with your primary care provider and/or cardiologist within 1 week to check your blood pressure and labs (renal function).          Return to ER if you develop chest pain, severe shortness of breath, lightheadedness/dizziness, nausea/vomiting, fevers or any other concerning symptoms.

## 2023-12-02 NOTE — PROGRESS NOTES
4 Eyes Skin Assessment Completed by KATIE Darnell and KATIE Lopez.    Head WDL  Ears WDL  Nose WDL  Mouth WDL  Neck WDL  Breast/Chest WDL  Shoulder Blades WDL  Spine WDL  (R) Arm/Elbow/Hand WDL  (L) Arm/Elbow/Hand WDL  Abdomen WDL  Groin WDL  Scrotum/Coccyx/Buttocks WDL  (R) Leg WDL  (L) Leg WDL  (R) Heel/Foot/Toe WDL  (L) Heel/Foot/Toe WDL          Devices In Places Tele Box and Blood Pressure Cuff      Interventions In Place N/A    Possible Skin Injury No    Pictures Uploaded Into Epic N/A  Wound Consult Placed N/A  RN Wound Prevention Protocol Ordered No

## 2023-12-02 NOTE — DISCHARGE SUMMARY
Discharge Summary    CHIEF COMPLAINT ON ADMISSION  Chief Complaint   Patient presents with    Cramping     Pt c/o complete body cramps for the last two days, pt was concerned about heart so was seen by cardiologist 2 days ago and had some medications changed including an increase in lasix pill due to water retention. Pt extensive cardiac hx.     Chest Pressure     Pt reports worsening chest pressure yesterday causing it to be difficult to catch breath.        Reason for Admission  Chest Pressure; Severe Cramps     Admission Date  12/1/2023    CODE STATUS  Full Code    HPI & HOSPITAL COURSE    Johny Toledo is a pleasant 53 y.o. male with possible history of ischemic cardiomyopathy with EF of 10 to 15% around 2008 and now improved to 40% an echocardiogram that was performed in September 2022.  S/p dual-chamber AICD, coronary artery disease and acute anterior anterolateral MI in 2010 s/p angioplasty of the LAD who presented to the hospital on 12/1/2023 with complaint of muscle cramping.  Patient reported that 2 days ago on November 28, 2023 he went to his cardiologist office and they increased the dose of his Lasix due to shortness of breath and also recommended to start him on spironolactone 12.5 mg daily.  Patient took this medication day before yesterday and instead of taking half pill of spironolactone 12.5 mg he took a whole pill of 25 mg of spironolactone.  Patient developed symptoms of muscle cramps and significant weakness he is unable to walk due to severe symptoms of weakness and muscle cramp.  Last night patient's girlfriend brought him Gatorade electrolytes and he has been drinking a overnight that slightly helped and his symptoms of muscle cramps.  Due to his significant weakness he did not take his medication last night.  He denies complaint of acute shortness of breath.  He also reported that 2 days ago he had episode of vomiting and this morning he had a large loose bowel movement.  There is no  other specific aggravating or elevating factors.  There is no other associated symptoms.     ER course: Patient found to have acute kidney injury with elevated kidney functions from his baseline.  He also found to have elevated CPK.  He was started on IV fluid due to concern for possible dehydration in the setting of diuretic use and diarrhea.    His lasix, spironolactone and Entresto was held and his renal function improved along with CPK.  His muscle spasms/cramps improved.  Patient asked about discharging home.  Will continue to hold his lasix, spironolactone and Entresto for a few more days then can restart Entreso, can restart lasix (only 20mg) and spironolactone (12.5mg) if develops LE edema, SOB or gains more than 3lbs in a day or 5lb in a week.  Needs close follow up to monitor renal function and fluid status.     Therefore, he is discharged in good and stable condition to home with close outpatient follow-up.    The patient recovered much more quickly than anticipated on admission.    Discharge Date  12/02/23      FOLLOW UP ITEMS POST DISCHARGE  Monitor renal function and fluid status  Cardiology follow up    DISCHARGE DIAGNOSES  Principal Problem:    KWESI (acute kidney injury) (HCC) (POA: Yes)  Active Problems:    STEMI (ST elevation myocardial infarction) pk  trop 150 POBA LAD and DIAG med rx. (POA: Yes)    HTN (hypertension) (POA: Yes)    Chronic systolic congestive heart failure (HCC) (Chronic) (POA: Yes)    Elevated CPK (POA: Unknown)  Resolved Problems:    * No resolved hospital problems. *      FOLLOW UP  No future appointments.  Rhea Cagle, A.P.N.  [id number]   [facility] [address]  [facility] [address] [phone]    Schedule an appointment as soon as possible for a visit      [facility]  [address]  [place] [address]  Schedule an appointment as soon as possible for a visit        MEDICATIONS ON DISCHARGE     Medication List        CONTINUE taking these medications         Instructions   ADDERALL XR (30MG) 30 MG XR capsule  Generic drug: amphetamine-dextroamphetamine ER   Take 30 mg by mouth 2 times a day as needed. Indications: Attention Deficit Hyperactivity Disorder  Dose: 30 mg     Afrin 12 Hour 0.05 % Soln  Generic drug: oxymetazoline   Administer 2-3 Sprays into affected nostril(S) 2 times a day as needed for Congestion.  Dose: 2-3 Spray     amLODIPine 5 MG Tabs  Commonly known as: Norvasc   Take 5 mg by mouth 2 times a day.  Dose: 5 mg     carvedilol 25 MG Tabs  Commonly known as: Coreg   Take 25 mg by mouth 2 times a day, with meals.  Dose: 25 mg     Entresto 49-51 MG Tabs  Generic drug: sacubitril-valsartan   Take 1 Tablet by mouth 2 times a day.  Dose: 1 Tablet     ezetimibe 10 MG Tabs  Commonly known as: Zetia   Take 10 mg by mouth every day.  Dose: 10 mg            STOP taking these medications      atorvastatin 80 MG tablet  Commonly known as: Lipitor     furosemide 20 MG Tabs  Commonly known as: Lasix     spironolactone 25 MG Tabs  Commonly known as: Aldactone              Allergies  No Known Allergies    DIET  Orders Placed This Encounter   Procedures    Diet Order Diet: Cardiac     Standing Status:   Standing     Number of Occurrences:   1     Order Specific Question:   Diet:     Answer:   Cardiac [6]       ACTIVITY  As tolerated.  Weight bearing as tolerated    CONSULTATIONS  None    PROCEDURES  None    Discharge exam:  Physical Exam  Constitutional:       General: He is not in acute distress.     Appearance: He is not ill-appearing, toxic-appearing or diaphoretic.   HENT:      Head: Normocephalic and atraumatic.      Nose: Nose normal.      Mouth/Throat:      Mouth: Mucous membranes are moist.   Eyes:      General: No scleral icterus.     Conjunctiva/sclera: Conjunctivae normal.   Cardiovascular:      Rate and Rhythm: Normal rate and regular rhythm.      Heart sounds: No murmur heard.     No friction rub. No gallop.   Pulmonary:      Effort: Pulmonary effort is  normal.      Breath sounds: Normal breath sounds.   Abdominal:      General: Bowel sounds are normal. There is no distension.      Palpations: Abdomen is soft.      Tenderness: There is no abdominal tenderness.   Musculoskeletal:      Cervical back: Normal range of motion.      Right lower leg: No edema.      Left lower leg: No edema.   Skin:     Coloration: Skin is not jaundiced.      Findings: No rash.   Neurological:      Mental Status: He is alert and oriented to person, place, and time. Mental status is at baseline.   Psychiatric:         Mood and Affect: Mood normal.         Behavior: Behavior normal.           LABORATORY  Lab Results   Component Value Date    SODIUM 140 12/02/2023    POTASSIUM 4.3 12/02/2023    CHLORIDE 106 12/02/2023    CO2 24 12/02/2023    GLUCOSE 106 (H) 12/02/2023    BUN 33 (H) 12/02/2023    CREATININE 1.72 (H) 12/02/2023    CREATININE 1.1 03/16/2009        Lab Results   Component Value Date    WBC 10.8 12/02/2023    HEMOGLOBIN 13.7 (L) 12/02/2023    HEMATOCRIT 42.5 12/02/2023    PLATELETCT 317 12/02/2023      DX-CHEST-PORTABLE (1 VIEW)  Narrative: 12/1/2023 12:13 PM    HISTORY/REASON FOR EXAM:  Chest Pain.    TECHNIQUE/EXAM DESCRIPTION AND NUMBER OF VIEWS:  Single portable view of the chest.    COMPARISON: 5/9/2022    FINDINGS:  The soft tissues and bony structures are unremarkable.    The heart and mediastinal structures are within normal limits.    Pulmonary vascularity is normal.    The lung fields are clear.    There is no effusion or pneumothorax.    Left pacer ICD is in place.  Impression: No acute cardiopulmonary abnormality.        Total time of the discharge process exceeds 32 minutes.

## 2023-12-03 LAB — EKG IMPRESSION: NORMAL

## 2023-12-05 ENCOUNTER — HOSPITAL ENCOUNTER (INPATIENT)
Facility: MEDICAL CENTER | Age: 53
LOS: 1 days | DRG: 683 | End: 2023-12-07
Attending: EMERGENCY MEDICINE | Admitting: STUDENT IN AN ORGANIZED HEALTH CARE EDUCATION/TRAINING PROGRAM
Payer: COMMERCIAL

## 2023-12-05 ENCOUNTER — APPOINTMENT (OUTPATIENT)
Dept: RADIOLOGY | Facility: MEDICAL CENTER | Age: 53
DRG: 683 | End: 2023-12-05
Attending: EMERGENCY MEDICINE
Payer: COMMERCIAL

## 2023-12-05 DIAGNOSIS — R06.02 SHORTNESS OF BREATH: ICD-10-CM

## 2023-12-05 DIAGNOSIS — I50.9 ACUTE ON CHRONIC CONGESTIVE HEART FAILURE, UNSPECIFIED HEART FAILURE TYPE (HCC): ICD-10-CM

## 2023-12-05 DIAGNOSIS — R53.83 LETHARGY: ICD-10-CM

## 2023-12-05 DIAGNOSIS — N17.9 AKI (ACUTE KIDNEY INJURY) (HCC): Primary | ICD-10-CM

## 2023-12-05 LAB
ALBUMIN SERPL BCP-MCNC: 4.2 G/DL (ref 3.2–4.9)
ALBUMIN/GLOB SERPL: 1.4 G/DL
ALP SERPL-CCNC: 88 U/L (ref 30–99)
ALT SERPL-CCNC: 41 U/L (ref 2–50)
ANION GAP SERPL CALC-SCNC: 13 MMOL/L (ref 7–16)
AST SERPL-CCNC: 56 U/L (ref 12–45)
BASOPHILS # BLD AUTO: 0.9 % (ref 0–1.8)
BASOPHILS # BLD: 0.1 K/UL (ref 0–0.12)
BILIRUB SERPL-MCNC: 0.5 MG/DL (ref 0.1–1.5)
BUN SERPL-MCNC: 40 MG/DL (ref 8–22)
CALCIUM ALBUM COR SERPL-MCNC: 8.8 MG/DL (ref 8.5–10.5)
CALCIUM SERPL-MCNC: 9 MG/DL (ref 8.5–10.5)
CHLORIDE SERPL-SCNC: 104 MMOL/L (ref 96–112)
CO2 SERPL-SCNC: 22 MMOL/L (ref 20–33)
CREAT SERPL-MCNC: 2.33 MG/DL (ref 0.5–1.4)
EKG IMPRESSION: NORMAL
EOSINOPHIL # BLD AUTO: 0.22 K/UL (ref 0–0.51)
EOSINOPHIL NFR BLD: 1.9 % (ref 0–6.9)
ERYTHROCYTE [DISTWIDTH] IN BLOOD BY AUTOMATED COUNT: 44.8 FL (ref 35.9–50)
FLUAV RNA SPEC QL NAA+PROBE: NEGATIVE
FLUBV RNA SPEC QL NAA+PROBE: NEGATIVE
GFR SERPLBLD CREATININE-BSD FMLA CKD-EPI: 33 ML/MIN/1.73 M 2
GLOBULIN SER CALC-MCNC: 3 G/DL (ref 1.9–3.5)
GLUCOSE SERPL-MCNC: 90 MG/DL (ref 65–99)
HCT VFR BLD AUTO: 44.5 % (ref 42–52)
HGB BLD-MCNC: 14.4 G/DL (ref 14–18)
IMM GRANULOCYTES # BLD AUTO: 0.02 K/UL (ref 0–0.11)
IMM GRANULOCYTES NFR BLD AUTO: 0.2 % (ref 0–0.9)
LYMPHOCYTES # BLD AUTO: 3.34 K/UL (ref 1–4.8)
LYMPHOCYTES NFR BLD: 28.8 % (ref 22–41)
MCH RBC QN AUTO: 29.3 PG (ref 27–33)
MCHC RBC AUTO-ENTMCNC: 32.4 G/DL (ref 32.3–36.5)
MCV RBC AUTO: 90.6 FL (ref 81.4–97.8)
MONOCYTES # BLD AUTO: 0.7 K/UL (ref 0–0.85)
MONOCYTES NFR BLD AUTO: 6 % (ref 0–13.4)
NEUTROPHILS # BLD AUTO: 7.23 K/UL (ref 1.82–7.42)
NEUTROPHILS NFR BLD: 62.2 % (ref 44–72)
NRBC # BLD AUTO: 0 K/UL
NRBC BLD-RTO: 0 /100 WBC (ref 0–0.2)
NT-PROBNP SERPL IA-MCNC: 5348 PG/ML (ref 0–125)
PLATELET # BLD AUTO: 315 K/UL (ref 164–446)
PMV BLD AUTO: 9.2 FL (ref 9–12.9)
POTASSIUM SERPL-SCNC: 4.3 MMOL/L (ref 3.6–5.5)
PROT SERPL-MCNC: 7.2 G/DL (ref 6–8.2)
RBC # BLD AUTO: 4.91 M/UL (ref 4.7–6.1)
RSV RNA SPEC QL NAA+PROBE: NEGATIVE
SARS-COV-2 RNA RESP QL NAA+PROBE: NOTDETECTED
SODIUM SERPL-SCNC: 139 MMOL/L (ref 135–145)
TROPONIN T SERPL-MCNC: 22 NG/L (ref 6–19)
WBC # BLD AUTO: 11.6 K/UL (ref 4.8–10.8)

## 2023-12-05 PROCEDURE — 96374 THER/PROPH/DIAG INJ IV PUSH: CPT

## 2023-12-05 PROCEDURE — C9803 HOPD COVID-19 SPEC COLLECT: HCPCS

## 2023-12-05 PROCEDURE — 84484 ASSAY OF TROPONIN QUANT: CPT

## 2023-12-05 PROCEDURE — 94760 N-INVAS EAR/PLS OXIMETRY 1: CPT

## 2023-12-05 PROCEDURE — 83930 ASSAY OF BLOOD OSMOLALITY: CPT

## 2023-12-05 PROCEDURE — 99285 EMERGENCY DEPT VISIT HI MDM: CPT

## 2023-12-05 PROCEDURE — 93005 ELECTROCARDIOGRAM TRACING: CPT | Performed by: EMERGENCY MEDICINE

## 2023-12-05 PROCEDURE — 85025 COMPLETE CBC W/AUTO DIFF WBC: CPT

## 2023-12-05 PROCEDURE — 93005 ELECTROCARDIOGRAM TRACING: CPT

## 2023-12-05 PROCEDURE — 0241U HCHG SARS-COV-2 COVID-19 NFCT DS RESP RNA 4 TRGT ED POC: CPT

## 2023-12-05 PROCEDURE — 51702 INSERT TEMP BLADDER CATH: CPT

## 2023-12-05 PROCEDURE — 82550 ASSAY OF CK (CPK): CPT

## 2023-12-05 PROCEDURE — 96372 THER/PROPH/DIAG INJ SC/IM: CPT

## 2023-12-05 PROCEDURE — 303105 HCHG CATHETER EXTRA

## 2023-12-05 PROCEDURE — 36415 COLL VENOUS BLD VENIPUNCTURE: CPT

## 2023-12-05 PROCEDURE — 83880 ASSAY OF NATRIURETIC PEPTIDE: CPT

## 2023-12-05 PROCEDURE — 51798 US URINE CAPACITY MEASURE: CPT

## 2023-12-05 PROCEDURE — 80053 COMPREHEN METABOLIC PANEL: CPT

## 2023-12-05 PROCEDURE — 71045 X-RAY EXAM CHEST 1 VIEW: CPT

## 2023-12-05 RX ORDER — FUROSEMIDE 10 MG/ML
40 INJECTION INTRAMUSCULAR; INTRAVENOUS ONCE
Status: COMPLETED | OUTPATIENT
Start: 2023-12-06 | End: 2023-12-06

## 2023-12-05 RX ORDER — SODIUM CHLORIDE 9 MG/ML
1000 INJECTION, SOLUTION INTRAVENOUS ONCE
Status: COMPLETED | OUTPATIENT
Start: 2023-12-06 | End: 2023-12-06

## 2023-12-05 ASSESSMENT — PAIN DESCRIPTION - PAIN TYPE: TYPE: ACUTE PAIN

## 2023-12-05 ASSESSMENT — FIBROSIS 4 INDEX: FIB4 SCORE: 1.02

## 2023-12-06 ENCOUNTER — APPOINTMENT (OUTPATIENT)
Dept: RADIOLOGY | Facility: MEDICAL CENTER | Age: 53
DRG: 683 | End: 2023-12-06
Attending: EMERGENCY MEDICINE
Payer: COMMERCIAL

## 2023-12-06 PROBLEM — N17.9 ACUTE KIDNEY INJURY SUPERIMPOSED ON CHRONIC KIDNEY DISEASE (HCC): Status: ACTIVE | Noted: 2023-12-06

## 2023-12-06 PROBLEM — S02.5XXA BROKEN TOOTH: Status: ACTIVE | Noted: 2023-12-06

## 2023-12-06 PROBLEM — N18.9 ACUTE KIDNEY INJURY SUPERIMPOSED ON CHRONIC KIDNEY DISEASE (HCC): Status: ACTIVE | Noted: 2023-12-06

## 2023-12-06 LAB
ALBUMIN SERPL BCP-MCNC: 3.4 G/DL (ref 3.2–4.9)
BUN SERPL-MCNC: 35 MG/DL (ref 8–22)
CALCIUM ALBUM COR SERPL-MCNC: 8.3 MG/DL (ref 8.5–10.5)
CALCIUM SERPL-MCNC: 7.8 MG/DL (ref 8.5–10.5)
CHLORIDE SERPL-SCNC: 103 MMOL/L (ref 96–112)
CK SERPL-CCNC: 626 U/L (ref 0–154)
CO2 SERPL-SCNC: 21 MMOL/L (ref 20–33)
CREAT SERPL-MCNC: 1.88 MG/DL (ref 0.5–1.4)
CREAT UR-MCNC: 195.18 MG/DL
GFR SERPLBLD CREATININE-BSD FMLA CKD-EPI: 42 ML/MIN/1.73 M 2
GLUCOSE SERPL-MCNC: 159 MG/DL (ref 65–99)
OSMOLALITY SERPL: 295 MOSM/KG H2O (ref 278–298)
PHOSPHATE SERPL-MCNC: 3 MG/DL (ref 2.5–4.5)
POTASSIUM SERPL-SCNC: 3.4 MMOL/L (ref 3.6–5.5)
SODIUM SERPL-SCNC: 137 MMOL/L (ref 135–145)
SODIUM UR-SCNC: 37 MMOL/L
TROPONIN T SERPL-MCNC: 16 NG/L (ref 6–19)

## 2023-12-06 PROCEDURE — 700102 HCHG RX REV CODE 250 W/ 637 OVERRIDE(OP): Performed by: STUDENT IN AN ORGANIZED HEALTH CARE EDUCATION/TRAINING PROGRAM

## 2023-12-06 PROCEDURE — 700105 HCHG RX REV CODE 258: Performed by: EMERGENCY MEDICINE

## 2023-12-06 PROCEDURE — 82570 ASSAY OF URINE CREATININE: CPT

## 2023-12-06 PROCEDURE — 36415 COLL VENOUS BLD VENIPUNCTURE: CPT

## 2023-12-06 PROCEDURE — 770020 HCHG ROOM/CARE - TELE (206)

## 2023-12-06 PROCEDURE — 99223 1ST HOSP IP/OBS HIGH 75: CPT | Mod: AI | Performed by: STUDENT IN AN ORGANIZED HEALTH CARE EDUCATION/TRAINING PROGRAM

## 2023-12-06 PROCEDURE — 700111 HCHG RX REV CODE 636 W/ 250 OVERRIDE (IP): Mod: JZ | Performed by: STUDENT IN AN ORGANIZED HEALTH CARE EDUCATION/TRAINING PROGRAM

## 2023-12-06 PROCEDURE — 700111 HCHG RX REV CODE 636 W/ 250 OVERRIDE (IP): Mod: JZ | Performed by: EMERGENCY MEDICINE

## 2023-12-06 PROCEDURE — 700105 HCHG RX REV CODE 258: Performed by: STUDENT IN AN ORGANIZED HEALTH CARE EDUCATION/TRAINING PROGRAM

## 2023-12-06 PROCEDURE — A9270 NON-COVERED ITEM OR SERVICE: HCPCS | Performed by: STUDENT IN AN ORGANIZED HEALTH CARE EDUCATION/TRAINING PROGRAM

## 2023-12-06 PROCEDURE — 84484 ASSAY OF TROPONIN QUANT: CPT

## 2023-12-06 PROCEDURE — 84300 ASSAY OF URINE SODIUM: CPT

## 2023-12-06 PROCEDURE — 76775 US EXAM ABDO BACK WALL LIM: CPT

## 2023-12-06 PROCEDURE — 80069 RENAL FUNCTION PANEL: CPT

## 2023-12-06 PROCEDURE — 700111 HCHG RX REV CODE 636 W/ 250 OVERRIDE (IP): Performed by: STUDENT IN AN ORGANIZED HEALTH CARE EDUCATION/TRAINING PROGRAM

## 2023-12-06 RX ORDER — POLYETHYLENE GLYCOL 3350 17 G/17G
1 POWDER, FOR SOLUTION ORAL
Status: DISCONTINUED | OUTPATIENT
Start: 2023-12-06 | End: 2023-12-07 | Stop reason: HOSPADM

## 2023-12-06 RX ORDER — EZETIMIBE 10 MG/1
10 TABLET ORAL DAILY
Status: DISCONTINUED | OUTPATIENT
Start: 2023-12-06 | End: 2023-12-07 | Stop reason: HOSPADM

## 2023-12-06 RX ORDER — AMOXICILLIN 250 MG
2 CAPSULE ORAL 2 TIMES DAILY
Status: DISCONTINUED | OUTPATIENT
Start: 2023-12-06 | End: 2023-12-07 | Stop reason: HOSPADM

## 2023-12-06 RX ORDER — HYDROCODONE BITARTRATE AND ACETAMINOPHEN 10; 325 MG/1; MG/1
1 TABLET ORAL EVERY 6 HOURS PRN
Status: DISCONTINUED | OUTPATIENT
Start: 2023-12-06 | End: 2023-12-07 | Stop reason: HOSPADM

## 2023-12-06 RX ORDER — HYDRALAZINE HYDROCHLORIDE 25 MG/1
25 TABLET, FILM COATED ORAL EVERY 8 HOURS
Status: DISCONTINUED | OUTPATIENT
Start: 2023-12-06 | End: 2023-12-06

## 2023-12-06 RX ORDER — HYDRALAZINE HYDROCHLORIDE 20 MG/ML
10 INJECTION INTRAMUSCULAR; INTRAVENOUS EVERY 6 HOURS PRN
Status: DISCONTINUED | OUTPATIENT
Start: 2023-12-06 | End: 2023-12-07 | Stop reason: HOSPADM

## 2023-12-06 RX ORDER — BISACODYL 10 MG
10 SUPPOSITORY, RECTAL RECTAL
Status: DISCONTINUED | OUTPATIENT
Start: 2023-12-06 | End: 2023-12-07 | Stop reason: HOSPADM

## 2023-12-06 RX ORDER — HYDRALAZINE HYDROCHLORIDE 25 MG/1
25 TABLET, FILM COATED ORAL 2 TIMES DAILY
Status: DISCONTINUED | OUTPATIENT
Start: 2023-12-07 | End: 2023-12-07 | Stop reason: HOSPADM

## 2023-12-06 RX ORDER — HEPARIN SODIUM 5000 [USP'U]/ML
5000 INJECTION, SOLUTION INTRAVENOUS; SUBCUTANEOUS EVERY 8 HOURS
Status: DISCONTINUED | OUTPATIENT
Start: 2023-12-06 | End: 2023-12-07 | Stop reason: HOSPADM

## 2023-12-06 RX ORDER — CARVEDILOL 6.25 MG/1
25 TABLET ORAL 2 TIMES DAILY WITH MEALS
Status: DISCONTINUED | OUTPATIENT
Start: 2023-12-06 | End: 2023-12-07 | Stop reason: HOSPADM

## 2023-12-06 RX ORDER — SODIUM CHLORIDE 9 MG/ML
500 INJECTION, SOLUTION INTRAVENOUS ONCE
Status: COMPLETED | OUTPATIENT
Start: 2023-12-06 | End: 2023-12-06

## 2023-12-06 RX ORDER — ATORVASTATIN CALCIUM 80 MG/1
80 TABLET, FILM COATED ORAL DAILY
COMMUNITY

## 2023-12-06 RX ORDER — ONDANSETRON 2 MG/ML
4 INJECTION INTRAMUSCULAR; INTRAVENOUS EVERY 4 HOURS PRN
Status: DISCONTINUED | OUTPATIENT
Start: 2023-12-06 | End: 2023-12-07 | Stop reason: HOSPADM

## 2023-12-06 RX ORDER — PROCHLORPERAZINE EDISYLATE 5 MG/ML
10 INJECTION INTRAMUSCULAR; INTRAVENOUS EVERY 6 HOURS PRN
Status: DISCONTINUED | OUTPATIENT
Start: 2023-12-06 | End: 2023-12-07 | Stop reason: HOSPADM

## 2023-12-06 RX ORDER — AMLODIPINE BESYLATE 5 MG/1
5 TABLET ORAL 2 TIMES DAILY
Status: DISCONTINUED | OUTPATIENT
Start: 2023-12-06 | End: 2023-12-07 | Stop reason: HOSPADM

## 2023-12-06 RX ADMIN — HYDROCODONE BITARTRATE AND ACETAMINOPHEN 1 TABLET: 10; 325 TABLET ORAL at 08:55

## 2023-12-06 RX ADMIN — SODIUM CHLORIDE 500 ML: 9 INJECTION, SOLUTION INTRAVENOUS at 15:33

## 2023-12-06 RX ADMIN — SODIUM CHLORIDE 500 ML: 9 INJECTION, SOLUTION INTRAVENOUS at 08:14

## 2023-12-06 RX ADMIN — FUROSEMIDE 40 MG: 10 INJECTION, SOLUTION INTRAVENOUS at 00:46

## 2023-12-06 RX ADMIN — EZETIMIBE 10 MG: 10 TABLET ORAL at 05:46

## 2023-12-06 RX ADMIN — HEPARIN SODIUM 5000 UNITS: 5000 INJECTION, SOLUTION INTRAVENOUS; SUBCUTANEOUS at 00:46

## 2023-12-06 RX ADMIN — CARVEDILOL 25 MG: 6.25 TABLET, FILM COATED ORAL at 16:29

## 2023-12-06 RX ADMIN — HYDRALAZINE HYDROCHLORIDE 25 MG: 25 TABLET, FILM COATED ORAL at 08:08

## 2023-12-06 RX ADMIN — SODIUM CHLORIDE 1000 ML: 9 INJECTION, SOLUTION INTRAVENOUS at 02:42

## 2023-12-06 RX ADMIN — CARVEDILOL 25 MG: 6.25 TABLET, FILM COATED ORAL at 08:08

## 2023-12-06 RX ADMIN — AMLODIPINE BESYLATE 5 MG: 5 TABLET ORAL at 05:46

## 2023-12-06 RX ADMIN — AMLODIPINE BESYLATE 5 MG: 5 TABLET ORAL at 16:29

## 2023-12-06 RX ADMIN — ONDANSETRON 4 MG: 2 INJECTION INTRAMUSCULAR; INTRAVENOUS at 08:55

## 2023-12-06 ASSESSMENT — COGNITIVE AND FUNCTIONAL STATUS - GENERAL
SUGGESTED CMS G CODE MODIFIER MOBILITY: CH
DAILY ACTIVITIY SCORE: 24
MOBILITY SCORE: 24
SUGGESTED CMS G CODE MODIFIER DAILY ACTIVITY: CH

## 2023-12-06 ASSESSMENT — FIBROSIS 4 INDEX
FIB4 SCORE: 1.47
FIB4 SCORE: 1.47

## 2023-12-06 ASSESSMENT — PAIN DESCRIPTION - PAIN TYPE
TYPE: ACUTE PAIN

## 2023-12-06 NOTE — ED PROVIDER NOTES
ED Provider Note    Scribed for Kaushik Joy by Selwyn Garcia. 12/5/2023  9:38 PM    Primary care provider: EMI Jorgensen  Means of arrival: Walk-in  History obtained from: Patient  History limited by: None    CHIEF COMPLAINT  Chief Complaint   Patient presents with    Shortness of Breath    Weakness     Patient complains of SOB, chest pressure, and weakness with no associated symptoms. Patient reports extensive cardiac hx. Patient reportedly was told to increase his lasix leading to a recent admission for kidney failure. Patient was told not to take his lasix upon d/c since 12/2 and reports not having peed today in spite of drinking plenty of fluids       EXTERNAL RECORDS REVIEWED  Inpatient Notes Patient was last admitted 12/01/2023 for acute kidney injury. History of cardiomyopathy with EF of 10-15%. History of MI with stents placed.    HPI/ROS  LIMITATION TO HISTORY   Select: : None    HPI  Johny Toledo is a 53 y.o. male with a history of PE, STEMI, CHF, Hyperlipidemia, Hypertension, and cardiomyopathy who presents to the Emergency Department for shortness of breath onset one hour and a half. The patient reports also experiencing generalized weakness. He reports that his legs felt weak and he could not walk very far when leaving work tonight. He denies any chest pain. He reports that he was recently admitted for kidney injury and was told to stop taking his lasix upon discharge. Patient reports he was admitted after he was told to double his lasix by his cardiologist, leading to severe cramping, nausea, vomiting, and dizziness that prompted him to come to the ED. He reports he has felt fine since discharge from admission up until tonight. He states he has not been able to urinate today despite drinking plenty of fluids. No alleviating or exacerbating factors noted. He denies any drug, alcohol, or tobacco use. Patient has no known allergies.    REVIEW OF SYSTEMS  As above, all other systems  reviewed and are negative.   See HPI for further details.     PAST MEDICAL HISTORY   has a past medical history of Abnormal electrocardiogram (2011), Bronchitis, Chronic systolic congestive heart failure (HCC) (2011), Congestive heart failure (HCC), Dizziness (3/31/2011), Fall, Heart valve disease, Hyperlipidemia, Hypertension, Male erectile disorder (2011), Mixed hyperlipidemia (2011), Narcolepsy with cataplexy, Pulmonary embolism ([facility]), and STEMI (ST elevation myocardial infarction) pk  trop 150 POBA LAD and DIAG med rx. (2010).  SURGICAL HISTORY   has a past surgical history that includes other (tonsilectomy); other cardiac surgery; zzz cardiac cath (2010); and tonsillectomy and adenoidectomy.  SOCIAL HISTORY  Social History     Tobacco Use    Smoking status: Former     Current packs/day: 0.00     Average packs/day: 0.5 packs/day for 15.0 years (7.5 ttl pk-yrs)     Types: Cigarettes     Start date: 1995     Quit date: 2010     Years since quittin.0    Smokeless tobacco: Never    Tobacco comments:     1/2 pack/day   Vaping Use    Vaping Use: Never used   Substance Use Topics    Alcohol use: Not Currently    Drug use: No      Social History     Substance and Sexual Activity   Drug Use No     FAMILY HISTORY  Family History   Problem Relation Age of Onset    Sleep Apnea Neg Hx      CURRENT MEDICATIONS  Home Medications       Reviewed by Sara Barbour R.N. (Registered Nurse) on 23 at 2108  Med List Status: Partial     Medication Last Dose Status   amLODIPine (NORVASC) 5 MG Tab  Active   amphetamine-dextroamphetamine ER (ADDERALL XR, 30MG,) 30 MG XR capsule  Active   carvedilol (COREG) 25 MG Tab  Active   ENTRESTO 49-51 MG Tab tablet  Active   ezetimibe (ZETIA) 10 MG Tab  Active   oxymetazoline (AFRIN 12 HOUR) 0.05 % Solution  Active                  ALLERGIES  No Known Allergies    PHYSICAL EXAM    VITAL SIGNS:   Vitals:    236 23   BP: (!)  135/105    Pulse: 100    Resp: 20    Temp: 36.1 °C (97 °F)    TempSrc: Temporal    SpO2: 98%    Weight:  70.6 kg (155 lb 10.3 oz)     Vitals: My interpretation: hypertensive, not tachycardic, afebrile, not hypoxic    Reinterpretation of vitals: Improving    Cardiac Monitor Interpretation: The cardiac monitor revealed normal Sinus Rhythm as interpreted by me. The cardiac monitor was ordered secondary to the patient's history of shortness of breath and to monitor for dysrhythmia and/or tachycardia.    PE:   Gen: sitting comfortably, speaking clearly, appears in no acute distress   ENT: Mucous membranes moist, posterior pharynx clear, uvula midline, nares patent bilaterally   Neck: Supple, FROM  Pulmonary: Lungs are clear to auscultation bilaterally. No tachypnea  CV:  RRR, no murmur appreciated, pulses 2+ in both upper and lower extremities  Abdomen: soft, NT/ND; no rebound/guarding  : no CVA or suprapubic tenderness   Neuro: A&Ox4 (person, place, time, situation), speech fluent, gait steady, no focal deficits appreciated  Skin: No rash or lesions.  No pallor or jaundice.  No cyanosis.  Warm and dry.   No lower extremity edema    DIAGNOSTIC STUDIES / PROCEDURES    LABS  Results for orders placed or performed during the hospital encounter of 12/05/23   CBC WITH DIFFERENTIAL   Result Value Ref Range    WBC 11.6 (H) 4.8 - 10.8 K/uL    RBC 4.91 4.70 - 6.10 M/uL    Hemoglobin 14.4 14.0 - 18.0 g/dL    Hematocrit 44.5 42.0 - 52.0 %    MCV 90.6 81.4 - 97.8 fL    MCH 29.3 27.0 - 33.0 pg    MCHC 32.4 32.3 - 36.5 g/dL    RDW 44.8 35.9 - 50.0 fL    Platelet Count 315 164 - 446 K/uL    MPV 9.2 9.0 - 12.9 fL    Neutrophils-Polys 62.20 44.00 - 72.00 %    Lymphocytes 28.80 22.00 - 41.00 %    Monocytes 6.00 0.00 - 13.40 %    Eosinophils 1.90 0.00 - 6.90 %    Basophils 0.90 0.00 - 1.80 %    Immature Granulocytes 0.20 0.00 - 0.90 %    Nucleated RBC 0.00 0.00 - 0.20 /100 WBC    Neutrophils (Absolute) 7.23 1.82 - 7.42 K/uL    Lymphs  (Absolute) 3.34 1.00 - 4.80 K/uL    Monos (Absolute) 0.70 0.00 - 0.85 K/uL    Eos (Absolute) 0.22 0.00 - 0.51 K/uL    Baso (Absolute) 0.10 0.00 - 0.12 K/uL    Immature Granulocytes (abs) 0.02 0.00 - 0.11 K/uL    NRBC (Absolute) 0.00 K/uL   COMP METABOLIC PANEL   Result Value Ref Range    Sodium 139 135 - 145 mmol/L    Potassium 4.3 3.6 - 5.5 mmol/L    Chloride 104 96 - 112 mmol/L    Co2 22 20 - 33 mmol/L    Anion Gap 13.0 7.0 - 16.0    Glucose 90 65 - 99 mg/dL    Bun 40 (H) 8 - 22 mg/dL    Creatinine 2.33 (H) 0.50 - 1.40 mg/dL    Calcium 9.0 8.5 - 10.5 mg/dL    Correct Calcium 8.8 8.5 - 10.5 mg/dL    AST(SGOT) 56 (H) 12 - 45 U/L    ALT(SGPT) 41 2 - 50 U/L    Alkaline Phosphatase 88 30 - 99 U/L    Total Bilirubin 0.5 0.1 - 1.5 mg/dL    Albumin 4.2 3.2 - 4.9 g/dL    Total Protein 7.2 6.0 - 8.2 g/dL    Globulin 3.0 1.9 - 3.5 g/dL    A-G Ratio 1.4 g/dL   TROPONIN   Result Value Ref Range    Troponin T 22 (H) 6 - 19 ng/L   proBrain Natriuretic Peptide, NT   Result Value Ref Range    NT-proBNP 5348 (H) 0 - 125 pg/mL   ESTIMATED GFR   Result Value Ref Range    GFR (CKD-EPI) 33 (A) >60 mL/min/1.73 m 2   EKG (Now)   Result Value Ref Range    Report       [facility] Emergency Dept.    Test Date:  2023  Pt Name:    LAVELL SPRINGER                 Department: ER  MRN:        [id number]                      Room:  Gender:     Male                         Technician: [id number]  :        1970                   Requested By:ER TRIAGE PROTOCOL  Order #:    [id number]                    Reading MD: Kaushik Joy    Measurements  Intervals                                Axis  Rate:       99                           P:          71  KS:         166                          QRS:        -22  QRSD:       108                          T:          117  QT:         384  QTc:        493    Interpretive Statements  Sinus tachycardia  Ventricular premature complex  Probable left atrial enlargement  LVH with secondary  repolarization abnormality  Borderline prolonged QT interval  Compared to ECG 12/01/2023 10:17:50  Ventricular premature complex(es) now present  Sinus rhythm no longer present  Electronically Signed On 12- 2 2:01:54 PST by Kaushik Joy     POC CoV-2, FLU A/B, RSV by PCR   Result Value Ref Range    POC Influenza A RNA, PCR Negative Negative    POC Influenza B RNA, PCR Negative Negative    POC RSV, by PCR Negative Negative    POC SARS-CoV-2, PCR NotDetected       All labs reviewed by me. Labs were compared to prior labs if they were available. Significant for no significant leukocytosis, no anemia, normal electrolytes, he has KWESI with creatinine of 2.33 up from baseline around 1.5, normal liver enzymes, normal bilirubin, troponin minimally elevated 22, BNP elevated at 5348, flu, COVID, RSV negative.    RADIOLOGY  I have independently interpreted the diagnostic imaging associated with this visit and am waiting the final reading from the radiologist.   My preliminary interpretation is a follows: No obvious pulmonary edema or pulm consolidation  Radiologist interpretation is as follows:  DX-CHEST-PORTABLE (1 VIEW)   Final Result         1.  No acute cardiopulmonary disease.      US-RENAL    (Results Pending)     COURSE & MEDICAL DECISION MAKING  Nursing notes, VS, PMSFHx, labs, imaging, EKG reviewed in chart.    Heart Score: low     ED Observation Status? Yes; I am placing the patient in to an observation status due to a diagnostic uncertainty as well as therapeutic intensity. Patient placed in observation status at 9:45 PM, 12/5/2023.     Observation plan is as follows: Monitor for symptom management and diagnostic results.    Upon Reevaluation, the patient's condition has: not improved; and will be escalated to hospitalization.    Patient discharged from ED Observation status at 11:47 PM (Time) 11:56 PM (Date).     Ddx: CHF exacerbation, pneumonia, dehydration, renal disease, KWESI    MDM: 9:38 PM Johny Fernandez  Tre is a 53 y.o. 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  Of note patient was just recently admitted here and discharged a few days ago for KWESI that was thought to be secondary to the Lasix medication patient takes   Lasix was held and he was told to hold it for about 5 days.  Does have a history of CHF exacerbations in the past.  Today feels like he is having orthopnea and persistent shortness of breath similar to previous CHF exacerbations.  Upon arrival here patient has normal vital signs.  His exam shows no signs of severe fluid overload, no lower extremity edema and his lungs are clear to auscultation.  Initiated work-up here to recheck his labs and blood work.  Chest x-ray my independent interpretation shows no signs of significant changes and cardiomegaly or pulmonary edema, radiologist agrees.  EKG without ischemic changes or arrhythmia.  All labs reviewed by me. Labs were compared to prior labs if they were available. Significant for no significant leukocytosis, no anemia, normal electrolytes, he has KWESI with creatinine of 2.33 up from baseline around 1.5, normal liver enzymes, normal bilirubin, troponin minimally elevated 22, BNP elevated at 5348, flu, COVID, RSV negative.  Overall I am concerned that his renal function is somewhat worse than it was 5 days ago and that he is now exhibiting signs of CHF exacerbation.  Ultrasound of the renal system was ordered.  Patient urinating without difficulty here in the ED.  Ultimately patient require admission.  Discussed hospice they are amenable.  Of note patient did just fractured tooth yesterday went to the dentist today supposed to be scheduled for acute extraction emergently in the morning.  I discussed the hospitalist that we would greatly appreciate if they could talk to dental surgery to have them  potentially put them on the schedule while patient is hospitalized here.  Patient declined pain control medications while here in the ED.  Stable at time of admission.    ADDITIONAL PROBLEM LIST AND DISPOSITION    I have discussed management of the patient with the following physicians and RUMA's: Hospitalist    Discussion of management with other QHP or appropriate source(s): None     Barriers to care at this time, including but not limited to:  None .     Decision tools and prescription drugs considered including, but not limited to: HEART Score low .    FINAL IMPRESSION  1. KWESI (acute kidney injury) (HCC) Acute   2. Shortness of breath Acute   3. Lethargy Acute   4. Acute on chronic congestive heart failure, unspecified heart failure type (HCC) Acute      Selwyn SCANLON (Scribe), am scribing for, and in the presence of, Kaushik Joy.    Electronically signed by: Selwyn Garcia (Scribe), 12/5/2023    IKaushik personally performed the services described in this documentation, as scribed by Selwyn Garcia in my presence, and it is both accurate and complete.    The note accurately reflects work and decisions made by me.  Kaushik Joy  12/5/2023  11:59 PM

## 2023-12-06 NOTE — ASSESSMENT & PLAN NOTE
EF improved to 40%   Assess kidney function in am and resume GDMT   May add vasodilators hydralazine and isosorbide if BP allows in am

## 2023-12-06 NOTE — ASSESSMENT & PLAN NOTE
Upper right reports he was scheduled to have extraction done today at 9 AM however given he is being admitted to the hospital he is unable to visit dentist  Oral surgery consult in a.m.  Will keep NPO   Patient would like to confirm insurance acceptance prior to moving forward with inpatient extraction

## 2023-12-06 NOTE — CARE PLAN
The patient is Watcher - Medium risk of patient condition declining or worsening    Shift Goals  Clinical Goals: admit patient to the unit, fluid bolus  Patient Goals: rest and food  Family Goals: JOSÉ LUIS    Progress made toward(s) clinical / shift goals:  Patient is aware that he is getting fluids for his kidneys and has been restarted on lasix. Patient admitted to the floor and notified of morning consult with surgery for his tooth.     Patient is progressing towards the following goals:    Problem: Knowledge Deficit - Standard  Goal: Patient and family/care givers will demonstrate understanding of plan of care, disease process/condition, diagnostic tests and medications  Outcome: Progressing     Problem: Psychosocial Needs:  Goal: Ability to cope will improve  Outcome: Progressing

## 2023-12-06 NOTE — ASSESSMENT & PLAN NOTE
Today Creatinine level of 2.33 which is increased from 1.72 from 12/2/2023  Check urine sodium, urine creatinine, serum osm   IV Fluids 1 liter given in Er along with IV lasix 40 mg   Hold ACE-I/ARB  F/u CPK   Assess response and resume diuretics in am   Follow up on renal sonogram to r/o obstruction   Avoid nephrotoxic agents  Renally adjust all medications

## 2023-12-06 NOTE — DISCHARGE PLANNING
Care Transition Team Assessment    Patient lives in [place], with his spouse in a 3/2 bath home.  Has two steps to get in, patient is currently using a wheelchair.  Has ramps to get into and out of home.    Patient and spouse are open to patient going to a skilled for additional rehab if needed, does not want to go to [facility]. Open to any facility in [facility], prefers [place] or [place] as it is closer to his home.       Information Source  Orientation Level: Oriented X4  Information Given By: Patient  Informant's Name: Johny  Who is responsible for making decisions for patient? : Patient    Readmission Evaluation  Is this a readmission?: Yes - unplanned readmission  Why do you think you were readmitted?: my back  Was an appointment arranged for you prior to discharge?: Yes, did not attend appointment  Why didn't you go to your appointment?: Other (comment) ([facility]  Were there new prescriptions you were supposed to fill after you were discharged?: Yes, prescriptions filled  Did you understand your discharge instructions?: Yes  Did you have enough support after your last discharge?: Yes    Elopement Risk  Legal Hold: No  Ambulatory or Self Mobile in Wheelchair: Yes  Disoriented: No  Psychiatric Symptoms: None  History of Wandering: No  Elopement this Admit: No  Vocalizing Wanting to Leave: No  Displays Behaviors, Body Language Wanting to Leave: No-Not at Risk for Elopement  Elopement Risk: Not at Risk for Elopement    Interdisciplinary Discharge Planning  Lives with - Patient's Self Care Capacity: Significant Other, Child Less than 18 Years of Age  Support Systems: Children, Spouse / Significant Other, Family Member(s), Friends / Neighbors  Housing / Facility: 1 Story Apartment / Condo  Durable Medical Equipment: Not Applicable    Discharge Preparedness  What is your plan after discharge?: Skilled nursing facility  What are your discharge supports?: Spouse,  Parent  Prior Functional Level: Independent with Activities of Daily Living  Difficulity with ADLs: Walking  Difficulity with IADLs: None    Functional Assesment  Prior Functional Level: Independent with Activities of Daily Living    Finances  Financial Barriers to Discharge: No  Prescription Coverage: Yes              Advance Directive  Advance Directive?: None    Domestic Abuse  Have you ever been the victim of abuse or violence?: No    Psychological Assessment  History of Substance Abuse: None  History of Psychiatric Problems: No  Non-compliant with Treatment: No  Newly Diagnosed Illness: No    Discharge Risks or Barriers  Discharge risks or barriers?: Complex medical needs  Patient risk factors: Complex medical needs    Anticipated Discharge Information  Discharge Disposition: D/T to SNF with Medicare cert in anticipation of skilled care (03)

## 2023-12-06 NOTE — CARE PLAN
The patient is Stable - Low risk of patient condition declining or worsening    Shift Goals  Clinical Goals: Safety/mobility  Patient Goals: rest and food  Family Goals: JOSÉ LUIS    Progress made toward(s) clinical / shift goals:    Problem: Pain - Standard  Goal: Alleviation of pain or a reduction in pain to the patient’s comfort goal  Outcome: Progressing   0-10 pain scale in place. Pt medicated per MAR.  Fall precautions in place, call light, bedside table, and pt belongings within reach. Toileting offered during rounding.     Patient is not progressing towards the following goals: N/A

## 2023-12-06 NOTE — ED NOTES
Med rec complete per patient  Allergies reviewed.   Patient denies any outpatient antibiotics in the last 30 days.   Anticoagulants taken in the last 14 days? No     Patients preferred [facility]: [facility]

## 2023-12-06 NOTE — H&P
Hospital Medicine History & Physical Note    Date of Service  12/6/2023    Primary Care Physician  SUSI Jorgensen.    Consultants  none    Code Status  Full Code    Chief Complaint  Chief Complaint   Patient presents with    Shortness of Breath    Weakness     Patient complains of SOB, chest pressure, and weakness with no associated symptoms. Patient reports extensive cardiac hx. Patient reportedly was told to increase his lasix leading to a recent admission for kidney failure. Patient was told not to take his lasix upon d/c since 12/2 and reports not having peed today in spite of drinking plenty of fluids       History of Presenting Illness  Johny Toledo is a 53 y.o. male past medical history of ischemic cardiomyopathy with improved EF to 40% in September 2022, CKD, AICD placement who presented 12/5/2023 with feeling like he was going into heart failure exacerbation.  Patient reports increased shortness of breath on exertion and lying flat.  Denies any lower extremity swelling.  Reports he was hospitalized on 12/1/2023 and at that time his diuretics was discontinued due to acute kidney injury.  Patient's acute kidney injury improved and patient was discharged and scheduled to follow-up with PCP.      Patient also reports today in the morning at 9 AM he has an appointment with his dentist for broken right upper tooth.    In the ER, he was noted to have creatinine level of 2.33 which is increased from 1.72 from 12/2/2023.  He currently does not appear significantly volume overloaded.he was given 1 liter of normal saline infusion in ER. He will need to be hospitalized to monitor his kidney function, obtain renal sonogram and tooth extraction by oral surgery in am.         I discussed the plan of care with patient and ERP .    Review of Systems  ROS    Past Medical History   has a past medical history of Abnormal electrocardiogram (7/27/2011), Bronchitis, Chronic systolic congestive heart failure (HCC)  (7/27/2011), Congestive heart failure (HCC), Dizziness (3/31/2011), Fall, Heart valve disease, Hyperlipidemia, Hypertension, Male erectile disorder (8/9/2011), Mixed hyperlipidemia (8/9/2011), Narcolepsy with cataplexy, Pulmonary embolism (HCC), and STEMI (ST elevation myocardial infarction) pk  trop 150 POBA LAD and DIAG med rx. (12/18/2010).    Surgical History   has a past surgical history that includes other (tonsilectomy); other cardiac surgery; [facility] cardiac cath (12/2010); and tonsillectomy and adenoidectomy.     Family History  family history is not on file.   Family history reviewed with patient. There is family history that is pertinent to the chief complaint.     Social History   reports that he quit smoking about 13 years ago. His smoking use included cigarettes. He started smoking about 28 years ago. He has a 7.5 pack-year smoking history. He has never used smokeless tobacco. He reports that he does not currently use alcohol. He reports that he does not use drugs.    Allergies  No Known Allergies    Medications  Prior to Admission Medications   Prescriptions Last Dose Informant Patient Reported? Taking?   ENTRESTO 49-51 MG Tab tablet  Patient, Patient's Home Pharmacy Yes No   Sig: Take 1 Tablet by mouth 2 times a day.   amLODIPine (NORVASC) 5 MG Tab  Patient, Patient's Home Pharmacy Yes No   Sig: Take 5 mg by mouth 2 times a day.   amphetamine-dextroamphetamine ER (ADDERALL XR, 30MG,) 30 MG XR capsule  Patient, Patient's Home Pharmacy Yes No   Sig: Take 30 mg by mouth 2 times a day as needed. Indications: Attention Deficit Hyperactivity Disorder   carvedilol (COREG) 25 MG Tab  Patient, Patient's Home Pharmacy Yes No   Sig: Take 25 mg by mouth 2 times a day, with meals.   ezetimibe (ZETIA) 10 MG Tab  Patient, Patient's Home Pharmacy Yes No   Sig: Take 10 mg by mouth every day.   oxymetazoline (AFRIN 12 HOUR) 0.05 % Solution  Patient Yes No   Sig: Administer 2-3 Sprays into affected nostril(S) 2 times a day  as needed for Congestion.      Facility-Administered Medications: None       Physical Exam  Temp:  [36.1 °C (97 °F)] 36.1 °C (97 °F)  Pulse:  [100] 100  Resp:  [20] 20  BP: (135)/(105) 135/105  SpO2:  [98 %] 98 %  Blood Pressure: (!) 135/105   Temperature: 36.1 °C (97 °F)   Pulse: 100   Respiration: 20   Pulse Oximetry: 98 %       Physical Exam    Laboratory:  Recent Labs     12/05/23 2138   WBC 11.6*   RBC 4.91   HEMOGLOBIN 14.4   HEMATOCRIT 44.5   MCV 90.6   MCH 29.3   MCHC 32.4   RDW 44.8   PLATELETCT 315   MPV 9.2     Recent Labs     12/05/23 2138   SODIUM 139   POTASSIUM 4.3   CHLORIDE 104   CO2 22   GLUCOSE 90   BUN 40*   CREATININE 2.33*   CALCIUM 9.0     Recent Labs     12/05/23 2138   ALTSGPT 41   ASTSGOT 56*   ALKPHOSPHAT 88   TBILIRUBIN 0.5   GLUCOSE 90         Recent Labs     12/05/23 2138   NTPROBNP 5348*         Recent Labs     12/05/23 2138   TROPONINT 22*       Imaging:  DX-CHEST-PORTABLE (1 VIEW)   Final Result         1.  No acute cardiopulmonary disease.      US-RENAL    (Results Pending)       X-Ray:  I have personally reviewed the images and compared with prior images.    Assessment/Plan:  Justification for Admission Status  I anticipate this patient will require at least two midnights for appropriate medical management, necessitating inpatient admission because acute on chronic kidney disease requiring further workup.    Patient will need a Telemetry bed on MEDICAL service .  The need is secondary to see above.    * Acute kidney injury superimposed on chronic kidney disease (HCC)- (present on admission)  Assessment & Plan  Today Creatinine level of 2.33 which is increased from 1.72 from 12/2/2023  Check urine sodium, urine creatinine, serum osm   IV Fluids 1 liter given in Er along with IV lasix 40 mg   Hold ACE-I/ARB  F/u CPK   Assess response and resume diuretics in am   Follow up on renal sonogram to r/o obstruction   Avoid nephrotoxic agents  Renally adjust all medications     Broken  tooth  Assessment & Plan  Upper right reports he was scheduled to have extraction done today at 9 AM however given he is being admitted to the hospital he is unable to visit dentist  Oral surgery consult in a.m.  Will keep NPO   Patient would like to confirm insurance acceptance prior to moving forward with inpatient extraction    Mixed hyperlipidemia- (present on admission)  Assessment & Plan  Zetia     Chronic systolic congestive heart failure (HCC)- (present on admission)  Assessment & Plan  EF improved to 40%   Assess kidney function in am and resume GDMT   May add vasodilators hydralazine and isosorbide if BP allows in am         HTN (hypertension)- (present on admission)  Assessment & Plan  Resume coreg and amlodipine         VTE prophylaxis: SCDs/TEDs

## 2023-12-06 NOTE — ED TRIAGE NOTES
Chief Complaint   Patient presents with    Shortness of Breath    Weakness     Patient complains of SOB, chest pressure, and weakness with no associated symptoms. Patient reports extensive cardiac hx. Patient reportedly was told to increase his lasix leading to a recent admission for kidney failure. Patient was told not to take his lasix upon d/c since 12/2 and reports not having peed today in spite of drinking plenty of fluids        Patient educated on triage process. Informed to notified ED staff of change in symptoms.     Vitals:    12/05/23 2046   BP: (!) 135/105   Pulse: 100   Resp: 20   Temp: 36.1 °C (97 °F)   SpO2: 98%

## 2023-12-06 NOTE — PROGRESS NOTES
4 Eyes Skin Assessment Completed by KATIE Krishnan and KATIE Woodson.    Head WDL  Ears WDL  Nose WDL  Mouth WDL  Neck WDL  Breast/Chest WDL  Shoulder Blades WDL  Spine WDL  (R) Arm/Elbow/Hand WDL  (L) Arm/Elbow/Hand WDL  Abdomen WDL  Groin WDL  Scrotum/Coccyx/Buttocks WDL  (R) Leg WDL  (L) Leg WDL  (R) Heel/Foot/Toe WDL  (L) Heel/Foot/Toe WDL          Devices In Places Tele Box, Blood Pressure Cuff, Pulse Ox, SCD's, and Pacer      Interventions In Place Pillows    Possible Skin Injury No    Pictures Uploaded Into Epic N/A  Wound Consult Placed N/A  RN Wound Prevention Protocol Ordered No

## 2023-12-07 ENCOUNTER — APPOINTMENT (OUTPATIENT)
Dept: CARDIOLOGY | Facility: MEDICAL CENTER | Age: 53
DRG: 683 | End: 2023-12-07
Attending: STUDENT IN AN ORGANIZED HEALTH CARE EDUCATION/TRAINING PROGRAM
Payer: COMMERCIAL

## 2023-12-07 VITALS
TEMPERATURE: 97.6 F | DIASTOLIC BLOOD PRESSURE: 82 MMHG | OXYGEN SATURATION: 99 % | HEART RATE: 77 BPM | WEIGHT: 148.81 LBS | BODY MASS INDEX: 24.02 KG/M2 | SYSTOLIC BLOOD PRESSURE: 120 MMHG | RESPIRATION RATE: 16 BRPM

## 2023-12-07 LAB
ANION GAP SERPL CALC-SCNC: 10 MMOL/L (ref 7–16)
BUN SERPL-MCNC: 40 MG/DL (ref 8–22)
CALCIUM SERPL-MCNC: 8.3 MG/DL (ref 8.5–10.5)
CHLORIDE SERPL-SCNC: 103 MMOL/L (ref 96–112)
CO2 SERPL-SCNC: 21 MMOL/L (ref 20–33)
CREAT SERPL-MCNC: 2.14 MG/DL (ref 0.5–1.4)
GFR SERPLBLD CREATININE-BSD FMLA CKD-EPI: 36 ML/MIN/1.73 M 2
GLUCOSE SERPL-MCNC: 160 MG/DL (ref 65–99)
LV EJECT FRACT MOD 2C 99903: 12.92
LV EJECT FRACT MOD 4C 99902: 9.63
LV EJECT FRACT MOD BP 99901: 9.18
POTASSIUM SERPL-SCNC: 3.7 MMOL/L (ref 3.6–5.5)
SODIUM SERPL-SCNC: 134 MMOL/L (ref 135–145)

## 2023-12-07 PROCEDURE — A9270 NON-COVERED ITEM OR SERVICE: HCPCS | Performed by: STUDENT IN AN ORGANIZED HEALTH CARE EDUCATION/TRAINING PROGRAM

## 2023-12-07 PROCEDURE — 80048 BASIC METABOLIC PNL TOTAL CA: CPT

## 2023-12-07 PROCEDURE — 99239 HOSP IP/OBS DSCHRG MGMT >30: CPT | Performed by: STUDENT IN AN ORGANIZED HEALTH CARE EDUCATION/TRAINING PROGRAM

## 2023-12-07 PROCEDURE — 36415 COLL VENOUS BLD VENIPUNCTURE: CPT

## 2023-12-07 PROCEDURE — 93306 TTE W/DOPPLER COMPLETE: CPT

## 2023-12-07 PROCEDURE — 93306 TTE W/DOPPLER COMPLETE: CPT | Mod: 26 | Performed by: INTERNAL MEDICINE

## 2023-12-07 PROCEDURE — 700117 HCHG RX CONTRAST REV CODE 255: Performed by: STUDENT IN AN ORGANIZED HEALTH CARE EDUCATION/TRAINING PROGRAM

## 2023-12-07 PROCEDURE — 700102 HCHG RX REV CODE 250 W/ 637 OVERRIDE(OP): Performed by: STUDENT IN AN ORGANIZED HEALTH CARE EDUCATION/TRAINING PROGRAM

## 2023-12-07 RX ADMIN — AMLODIPINE BESYLATE 5 MG: 5 TABLET ORAL at 05:35

## 2023-12-07 RX ADMIN — CARVEDILOL 25 MG: 6.25 TABLET, FILM COATED ORAL at 07:29

## 2023-12-07 RX ADMIN — CARVEDILOL 25 MG: 6.25 TABLET, FILM COATED ORAL at 16:28

## 2023-12-07 RX ADMIN — EZETIMIBE 10 MG: 10 TABLET ORAL at 05:36

## 2023-12-07 RX ADMIN — HYDRALAZINE HYDROCHLORIDE 25 MG: 25 TABLET, FILM COATED ORAL at 05:36

## 2023-12-07 RX ADMIN — HUMAN ALBUMIN MICROSPHERES AND PERFLUTREN 3 ML: 10; .22 INJECTION, SOLUTION INTRAVENOUS at 14:30

## 2023-12-07 ASSESSMENT — PAIN DESCRIPTION - PAIN TYPE
TYPE: ACUTE PAIN
TYPE: ACUTE PAIN

## 2023-12-07 ASSESSMENT — FIBROSIS 4 INDEX: FIB4 SCORE: 1.47

## 2023-12-07 NOTE — CARE PLAN
The patient is Stable - Low risk of patient condition declining or worsening    Shift Goals  Clinical Goals: saftey/ monitor labs  Patient Goals: rest  Family Goals: JOSÉ LUIS    Progress made toward(s) clinical / shift goals:    Problem: Knowledge Deficit - Standard  Goal: Patient and family/care givers will demonstrate understanding of plan of care, disease process/condition, diagnostic tests and medications  Outcome: Progressing     Problem: Physical Regulation:  Goal: Diagnostic test results will improve  Outcome: Progressing     Problem: Knowledge Deficit:  Goal: Patient's knowledge of the prescribed therapeutic regimen will improve  Outcome: Progressing     Problem: Pain - Standard  Goal: Alleviation of pain or a reduction in pain to the patient’s comfort goal  Outcome: Progressing

## 2023-12-07 NOTE — PROGRESS NOTES
Monitor summary: SR 73-89, TN 0.19, QRS 0.09, QT 0.42, with occasional PVCs per strip from monitor room.

## 2023-12-07 NOTE — PROGRESS NOTES
Assumed care of patient at 1900. Bedside report received from day RN. Patient with family at bedside when rounded on. Tele box in place. Fall precautions in place. All needs met at this time- call bell within reach

## 2023-12-07 NOTE — CARE PLAN
The patient is Stable - Low risk of patient condition declining or worsening    Shift Goals  Clinical Goals: Safety/mobility  Patient Goals: rest  Family Goals: JOSÉ LUIS    Progress made toward(s) clinical / shift goals:    Problem: Pain - Standard  Goal: Alleviation of pain or a reduction in pain to the patient’s comfort goal  Outcome: Progressing   0-10 pain scale in place. Encourage pt to notify this RN if pain medication is needed.   Fall precautions in place. Pt has call light, bedside table, and pt belongings within reach. Offered toileting during rounding.     Patient is not progressing towards the following goals:N/A

## 2023-12-07 NOTE — PROGRESS NOTES
Hospitalist progress note:  Patient admitted with KWESI, hx CHF.  FENa low, suggests prerenal etiology.  KWESI improved with fluid bolus.  Continue to hold home CHF meds entresto, lasix, spironolactone.  Patient is not fluid overloaded.  Recheck kidney function in AM.  Repeat echocardiogram, no recent studies seen.  Renal US reviewed, normal findings.  Anticipate discharge home tomorrow if kidney function improves and echo is reassuring.

## 2023-12-07 NOTE — PROGRESS NOTES
Cardiology review note:     I was called by Dr. Brannon regarding this 53-year-old male admitted with acute kidney injury with a history of ischemic and nonischemic cardiomyopathy and prior positive methamphetamine drug screen who had an anterior STEMI 2010 status post primary PCI.  He is followed by Dr. Maharaj longitudinally per report.  Consultation obtained for echocardiogram revealing reduced EF.  Does not appear to be in decompensated heart failure, prior LVEF was 15% subsequently recovered to 35% most recently and now reduced to 15%.  AICD present.  Recommend optimization of neurohumoral therapy (continue outpatient Entresto which is currently not ordered, carvedilol, Zetia and statin, antiplatelet therapy and consider SGLT2 inhibitor if renal function tolerates.Outpatient follow-up with his primary cardiologist Dr. Maharaj for further management.        At this time it was deemed no formal in person cardiology consultation was necessary, however if this changes due to changes in patient condition or abnormal test results, please consider formal cardiovascular consultation.    Electronically Signed by:    Bernardino Khan MD, FACC, [facility]  Division of Interventional Cardiology  [facility]    12/7/2023  3:46 PM

## 2023-12-08 NOTE — DISCHARGE SUMMARY
Discharge Summary    CHIEF COMPLAINT ON ADMISSION  Chief Complaint   Patient presents with    Shortness of Breath    Weakness     Patient complains of SOB, chest pressure, and weakness with no associated symptoms. Patient reports extensive cardiac hx. Patient reportedly was told to increase his lasix leading to a recent admission for kidney failure. Patient was told not to take his lasix upon d/c since 12/2 and reports not having peed today in spite of drinking plenty of fluids       Reason for Admission  chest pain     Admission Date  12/5/2023    CODE STATUS  Full Code    HPI & HOSPITAL COURSE  Johny Toledo is a 53 y.o. male past medical history of ischemic cardiomyopathy with improved EF to 40% in September 2022, CKD, AICD placement who presented 12/5/2023 with feeling like he was going into heart failure exacerbation.  Patient reports increased shortness of breath on exertion and lying flat.  Denies any lower extremity swelling.  Reports he was hospitalized on 12/1/2023 and at that time his diuretics was discontinued due to acute kidney injury.  Patient's acute kidney injury improved and patient was discharged and scheduled to follow-up with PCP.     In the ER, he was noted to have creatinine level of 2.33 which is increased from 1.72 from 12/2/2023.  He currently does not appear significantly volume overloaded.he was given 1 liter of normal saline infusion in ER. He was admitted for KWESI evaluation. Patients kidney function improved with IV fluids, then worsened slightly by time of discharge. Echocardiogram performed showing new worse LV EF <15%, with severe tricuspid regurgitation. Troponin normal, EKG stable. Patient ambulating well, not decompensated from heart failure perspective. Suspect some degree of cardiorenal syndrome leading to abnormal kidney function. Discussed with cardiology who recommend outpatient follow up with patient's cardiologist. Discussed recommendations with patient and family to  follow up with cardiologist. I recommend left heart catheterization and angiogram to assess underlying coronary artery disease. At this time patient is discharged to home with close cardiology follow up next week. He is advised to hold his home entresto, lasix, aldactone, although these will likely need to be restarted in the near future as determined by his cardiologist.    Therefore, he is discharged in fair and stable condition to home with close outpatient follow-up.    The patient recovered much more quickly than anticipated on admission.    Discharge Date  12/7/2023    FOLLOW UP ITEMS POST DISCHARGE  Take medications as prescribed.  Follow up with cardiology next week, PCP.    DISCHARGE DIAGNOSES  Principal Problem:    Acute kidney injury superimposed on chronic kidney disease (HCC) (POA: Yes)  Active Problems:    HTN (hypertension) (POA: Yes)    Chronic systolic congestive heart failure (HCC) (Chronic) (POA: Yes)    Mixed hyperlipidemia (Chronic) (POA: Yes)    Broken tooth (POA: Unknown)  Resolved Problems:    * No resolved hospital problems. *      FOLLOW UP  No future appointments.  GRAEME JorgensenPBeeN.  [address] [facility]   [address] [id number] [phone]    Follow up in 1 week(s)        MEDICATIONS ON DISCHARGE     Medication List        CONTINUE taking these medications        Instructions   ADDERALL XR (30MG) 30 MG XR capsule  Generic drug: amphetamine-dextroamphetamine ER   Take 30 mg by mouth 2 times a day as needed (Narcolepsy).  Dose: 30 mg     Afrin 12 Hour 0.05 % Soln  Generic drug: oxymetazoline   Administer 2-3 Sprays into affected nostril(S) 2 times a day as needed for Congestion.  Dose: 2-3 Spray     amLODIPine 5 MG Tabs  Commonly known as: Norvasc   Take 5 mg by mouth 2 times a day.  Dose: 5 mg     atorvastatin 80 MG tablet  Commonly known as: Lipitor   Take 80 mg by mouth every day.  Dose: 80 mg     carvedilol 25 MG Tabs  Commonly known as: Coreg   Take 25 mg by mouth 2 times a day,  with meals.  Dose: 25 mg     ezetimibe 10 MG Tabs  Commonly known as: Zetia   Take 10 mg by mouth every day.  Dose: 10 mg            STOP taking these medications      Entresto 49-51 MG Tabs  Generic drug: sacubitril-valsartan              Allergies  No Known Allergies    DIET  Orders Placed This Encounter   Procedures    Diet Order Diet: Renal     Standing Status:   Standing     Number of Occurrences:   1     Order Specific Question:   Diet:     Answer:   Renal [8]       ACTIVITY  As tolerated.  Weight bearing as tolerated    CONSULTATIONS  none    PROCEDURES  none    LABORATORY  Lab Results   Component Value Date    SODIUM 134 (L) 12/07/2023    POTASSIUM 3.7 12/07/2023    CHLORIDE 103 12/07/2023    CO2 21 12/07/2023    GLUCOSE 160 (H) 12/07/2023    BUN 40 (H) 12/07/2023    CREATININE 2.14 (H) 12/07/2023    CREATININE 1.1 03/16/2009        Lab Results   Component Value Date    WBC 11.6 (H) 12/05/2023    HEMOGLOBIN 14.4 12/05/2023    HEMATOCRIT 44.5 12/05/2023    PLATELETCT 315 12/05/2023        Total time of the discharge process exceeds 48 minutes.

## 2023-12-08 NOTE — DISCHARGE INSTRUCTIONS
Discharge Instructions    Discharged to home by car with relative. Discharged via wheelchair, hospital escort: Yes.  Special equipment needed: Not Applicable    Be sure to schedule a follow-up appointment with your primary care doctor or any specialists as instructed.     Discharge Plan:   Diet Plan: Discussed  Activity Level: Discussed  Confirmed Follow up Appointment: Patient to Call and Schedule Appointment  Confirmed Symptoms Management: Discussed  Medication Reconciliation Updated: Yes    I understand that a diet low in cholesterol, fat, and sodium is recommended for good health. Unless I have been given specific instructions below for another diet, I accept this instruction as my diet prescription.   Other diet: Renal diet    Special Instructions: None    -Is this patient being discharged with medication to prevent blood clots?  No    Is patient discharged on Warfarin / Coumadin?   No

## 2023-12-08 NOTE — PROGRESS NOTES
Pt given discharge instructions and education. IV's removed, pt has all of his belongings including his cell phone. Pt prefers to walk out with girlfriend instead of being escorted to car by nursing staff.

## 2023-12-11 ENCOUNTER — TELEPHONE (OUTPATIENT)
Dept: HEALTH INFORMATION MANAGEMENT | Facility: OTHER | Age: 53
End: 2023-12-11
Payer: COMMERCIAL

## 2024-04-25 NOTE — TELEPHONE ENCOUNTER
Called patient and informed prescription ready to be picked up at [facility]   Previously Declined (within the last year)

## 2024-09-13 ENCOUNTER — APPOINTMENT (OUTPATIENT)
Dept: RADIOLOGY | Facility: MEDICAL CENTER | Age: 54
End: 2024-09-13
Attending: STUDENT IN AN ORGANIZED HEALTH CARE EDUCATION/TRAINING PROGRAM
Payer: COMMERCIAL

## 2024-09-13 ENCOUNTER — OFFICE VISIT (OUTPATIENT)
Dept: URGENT CARE | Facility: PHYSICIAN GROUP | Age: 54
End: 2024-09-13
Payer: COMMERCIAL

## 2024-09-13 ENCOUNTER — HOSPITAL ENCOUNTER (OUTPATIENT)
Dept: RADIOLOGY | Facility: MEDICAL CENTER | Age: 54
End: 2024-09-13
Payer: COMMERCIAL

## 2024-09-13 ENCOUNTER — HOSPITAL ENCOUNTER (EMERGENCY)
Facility: MEDICAL CENTER | Age: 54
End: 2024-09-13
Attending: STUDENT IN AN ORGANIZED HEALTH CARE EDUCATION/TRAINING PROGRAM
Payer: COMMERCIAL

## 2024-09-13 VITALS
OXYGEN SATURATION: 98 % | HEART RATE: 99 BPM | SYSTOLIC BLOOD PRESSURE: 120 MMHG | TEMPERATURE: 97.8 F | RESPIRATION RATE: 16 BRPM | DIASTOLIC BLOOD PRESSURE: 66 MMHG | HEIGHT: 66 IN | BODY MASS INDEX: 25.96 KG/M2 | WEIGHT: 161.5 LBS

## 2024-09-13 VITALS
WEIGHT: 157.63 LBS | HEIGHT: 66 IN | SYSTOLIC BLOOD PRESSURE: 130 MMHG | TEMPERATURE: 97.8 F | OXYGEN SATURATION: 95 % | DIASTOLIC BLOOD PRESSURE: 89 MMHG | RESPIRATION RATE: 16 BRPM | HEART RATE: 98 BPM | BODY MASS INDEX: 25.33 KG/M2

## 2024-09-13 DIAGNOSIS — R06.02 SOB (SHORTNESS OF BREATH): ICD-10-CM

## 2024-09-13 DIAGNOSIS — R10.13 EPIGASTRIC PAIN: ICD-10-CM

## 2024-09-13 DIAGNOSIS — R10.9 ABDOMINAL CRAMPING: ICD-10-CM

## 2024-09-13 DIAGNOSIS — N17.9 AKI (ACUTE KIDNEY INJURY) (HCC): ICD-10-CM

## 2024-09-13 DIAGNOSIS — K59.00 CONSTIPATION, UNSPECIFIED CONSTIPATION TYPE: ICD-10-CM

## 2024-09-13 DIAGNOSIS — E86.0 DEHYDRATION: ICD-10-CM

## 2024-09-13 LAB
ALBUMIN SERPL BCP-MCNC: 4.1 G/DL (ref 3.2–4.9)
ALBUMIN/GLOB SERPL: 1.1 G/DL
ALP SERPL-CCNC: 66 U/L (ref 30–99)
ALT SERPL-CCNC: 18 U/L (ref 2–50)
ANION GAP SERPL CALC-SCNC: 13 MMOL/L (ref 7–16)
AST SERPL-CCNC: 20 U/L (ref 12–45)
BASOPHILS # BLD AUTO: 0.9 % (ref 0–1.8)
BASOPHILS # BLD: 0.09 K/UL (ref 0–0.12)
BILIRUB SERPL-MCNC: 0.8 MG/DL (ref 0.1–1.5)
BUN SERPL-MCNC: 36 MG/DL (ref 8–22)
CALCIUM ALBUM COR SERPL-MCNC: 9.4 MG/DL (ref 8.5–10.5)
CALCIUM SERPL-MCNC: 9.5 MG/DL (ref 8.5–10.5)
CHLORIDE SERPL-SCNC: 104 MMOL/L (ref 96–112)
CO2 SERPL-SCNC: 22 MMOL/L (ref 20–33)
CREAT SERPL-MCNC: 2.37 MG/DL (ref 0.5–1.4)
EKG IMPRESSION: NORMAL
EOSINOPHIL # BLD AUTO: 0.24 K/UL (ref 0–0.51)
EOSINOPHIL NFR BLD: 2.3 % (ref 0–6.9)
ERYTHROCYTE [DISTWIDTH] IN BLOOD BY AUTOMATED COUNT: 44.8 FL (ref 35.9–50)
GFR SERPLBLD CREATININE-BSD FMLA CKD-EPI: 32 ML/MIN/1.73 M 2
GLOBULIN SER CALC-MCNC: 3.7 G/DL (ref 1.9–3.5)
GLUCOSE SERPL-MCNC: 103 MG/DL (ref 65–99)
HCT VFR BLD AUTO: 43.2 % (ref 42–52)
HGB BLD-MCNC: 15.1 G/DL (ref 14–18)
IMM GRANULOCYTES # BLD AUTO: 0.03 K/UL (ref 0–0.11)
IMM GRANULOCYTES NFR BLD AUTO: 0.3 % (ref 0–0.9)
LIPASE SERPL-CCNC: 43 U/L (ref 11–82)
LYMPHOCYTES # BLD AUTO: 2.33 K/UL (ref 1–4.8)
LYMPHOCYTES NFR BLD: 22.7 % (ref 22–41)
MCH RBC QN AUTO: 32.6 PG (ref 27–33)
MCHC RBC AUTO-ENTMCNC: 35 G/DL (ref 32.3–36.5)
MCV RBC AUTO: 93.3 FL (ref 81.4–97.8)
MONOCYTES # BLD AUTO: 0.58 K/UL (ref 0–0.85)
MONOCYTES NFR BLD AUTO: 5.6 % (ref 0–13.4)
NEUTROPHILS # BLD AUTO: 7.01 K/UL (ref 1.82–7.42)
NEUTROPHILS NFR BLD: 68.2 % (ref 44–72)
NRBC # BLD AUTO: 0 K/UL
NRBC BLD-RTO: 0 /100 WBC (ref 0–0.2)
NT-PROBNP SERPL IA-MCNC: 8385 PG/ML (ref 0–125)
PLATELET # BLD AUTO: 278 K/UL (ref 164–446)
PMV BLD AUTO: 9.3 FL (ref 9–12.9)
POTASSIUM SERPL-SCNC: 4.1 MMOL/L (ref 3.6–5.5)
PROT SERPL-MCNC: 7.8 G/DL (ref 6–8.2)
RBC # BLD AUTO: 4.63 M/UL (ref 4.7–6.1)
SODIUM SERPL-SCNC: 139 MMOL/L (ref 135–145)
TROPONIN T SERPL-MCNC: 13 NG/L (ref 6–19)
WBC # BLD AUTO: 10.3 K/UL (ref 4.8–10.8)

## 2024-09-13 PROCEDURE — 84484 ASSAY OF TROPONIN QUANT: CPT

## 2024-09-13 PROCEDURE — 36415 COLL VENOUS BLD VENIPUNCTURE: CPT

## 2024-09-13 PROCEDURE — 3074F SYST BP LT 130 MM HG: CPT

## 2024-09-13 PROCEDURE — 3078F DIAST BP <80 MM HG: CPT

## 2024-09-13 PROCEDURE — 83690 ASSAY OF LIPASE: CPT

## 2024-09-13 PROCEDURE — 71045 X-RAY EXAM CHEST 1 VIEW: CPT

## 2024-09-13 PROCEDURE — 99284 EMERGENCY DEPT VISIT MOD MDM: CPT

## 2024-09-13 PROCEDURE — 93005 ELECTROCARDIOGRAM TRACING: CPT | Performed by: STUDENT IN AN ORGANIZED HEALTH CARE EDUCATION/TRAINING PROGRAM

## 2024-09-13 PROCEDURE — 71046 X-RAY EXAM CHEST 2 VIEWS: CPT

## 2024-09-13 PROCEDURE — 80053 COMPREHEN METABOLIC PANEL: CPT

## 2024-09-13 PROCEDURE — 85025 COMPLETE CBC W/AUTO DIFF WBC: CPT

## 2024-09-13 PROCEDURE — 93000 ELECTROCARDIOGRAM COMPLETE: CPT

## 2024-09-13 PROCEDURE — 83880 ASSAY OF NATRIURETIC PEPTIDE: CPT

## 2024-09-13 PROCEDURE — 74018 RADEX ABDOMEN 1 VIEW: CPT

## 2024-09-13 PROCEDURE — 700105 HCHG RX REV CODE 258: Performed by: STUDENT IN AN ORGANIZED HEALTH CARE EDUCATION/TRAINING PROGRAM

## 2024-09-13 PROCEDURE — 93005 ELECTROCARDIOGRAM TRACING: CPT

## 2024-09-13 PROCEDURE — 99215 OFFICE O/P EST HI 40 MIN: CPT

## 2024-09-13 RX ORDER — POLYETHYLENE GLYCOL 3350 17 G/17G
17 POWDER, FOR SOLUTION ORAL 2 TIMES DAILY
Qty: 6 PACKET | Refills: 0 | Status: SHIPPED | OUTPATIENT
Start: 2024-09-13 | End: 2024-09-16

## 2024-09-13 RX ORDER — SODIUM CHLORIDE, SODIUM LACTATE, POTASSIUM CHLORIDE, CALCIUM CHLORIDE 600; 310; 30; 20 MG/100ML; MG/100ML; MG/100ML; MG/100ML
1000 INJECTION, SOLUTION INTRAVENOUS ONCE
Status: COMPLETED | OUTPATIENT
Start: 2024-09-13 | End: 2024-09-13

## 2024-09-13 RX ADMIN — SODIUM CHLORIDE, POTASSIUM CHLORIDE, SODIUM LACTATE AND CALCIUM CHLORIDE 1000 ML: 600; 310; 30; 20 INJECTION, SOLUTION INTRAVENOUS at 20:40

## 2024-09-13 ASSESSMENT — ENCOUNTER SYMPTOMS
PALPITATIONS: 0
HEARTBURN: 0
PND: 0
FEVER: 0
CHILLS: 0
NAUSEA: 0
HEMOPTYSIS: 0
DIAPHORESIS: 1
FLANK PAIN: 0
ABDOMINAL PAIN: 1
CLAUDICATION: 0
SPUTUM PRODUCTION: 0
ORTHOPNEA: 1
WHEEZING: 0
VOMITING: 0
BLOOD IN STOOL: 0
COUGH: 0
SHORTNESS OF BREATH: 1
CONSTIPATION: 0
DIARRHEA: 0

## 2024-09-13 ASSESSMENT — PAIN DESCRIPTION - PAIN TYPE: TYPE: ACUTE PAIN

## 2024-09-13 ASSESSMENT — FIBROSIS 4 INDEX
FIB4 SCORE: 1.5
FIB4 SCORE: 1.5

## 2024-09-13 NOTE — PROGRESS NOTES
Subjective:   Johny Toledo is a very pleasant 54 y.o. male who presents for:    Chief Complaint   Patient presents with    Other     Stomach px x 2 days, shortness of breath, blooded, fatigue        HPI:    The patient presents to the urgent care for evaluation of abdominal pain.  He reports that approximately 2 days ago, he was at work at [employer] when he experienced severe abdominal pain with associated diaphoresis.  The episode occurred immediately after eating.  He denies any sort of chest pain or heart palpitations during this episode.  The abdominal pain did not radiate.  Today, after eating, he experienced the same type of abdominal pain, which lasted approximately 1 hour.  He reports that the pain was so severe, that he was doubled over.  He also has been having increasing shortness of breath.  It is worse with exertion and laying down.  He also reports severe fatigue.  Pertinent negatives include no nausea, vomiting, diarrhea, fever, lower extremity edema, claudication.  His past medical history is significant for STEMI, pacemaker with AICD placement, hypertension.  He has not been evaluated by a cardiologist recently and reports that his pacemaker battery may be at the end of its life.  He has been taking all cardiac medications as prescribed.  Denies any abdominal surgeries, cholecystitis, cholelithiasis, pyelonephritis.  He does not drink alcohol.    ROS:    Review of Systems   Constitutional:  Positive for diaphoresis and malaise/fatigue. Negative for chills and fever.   Respiratory:  Positive for shortness of breath. Negative for cough, hemoptysis, sputum production and wheezing.    Cardiovascular:  Positive for orthopnea. Negative for chest pain, palpitations, claudication, leg swelling and PND.   Gastrointestinal:  Positive for abdominal pain. Negative for blood in stool, constipation, diarrhea, heartburn, melena, nausea and vomiting.   Genitourinary:  Negative for dysuria, flank pain, frequency,  hematuria and urgency.   All other systems reviewed and are negative.      Medications:      Current Outpatient Medications   Medication Sig    atorvastatin (LIPITOR) 80 MG tablet Take 80 mg by mouth every day.    amphetamine-dextroamphetamine ER (ADDERALL XR, 30MG,) 30 MG XR capsule Take 30 mg by mouth 2 times a day as needed (Narcolepsy).    amLODIPine (NORVASC) 5 MG Tab Take 5 mg by mouth 2 times a day.    carvedilol (COREG) 25 MG Tab Take 25 mg by mouth 2 times a day, with meals.    oxymetazoline (AFRIN 12 HOUR) 0.05 % Solution Administer 2-3 Sprays into affected nostril(S) 2 times a day as needed for Congestion. (Patient not taking: Reported on 9/13/2024)    ezetimibe (ZETIA) 10 MG Tab Take 10 mg by mouth every day. (Patient not taking: Reported on 9/13/2024)       Allergies:     No Known Allergies    Problem List:     Patient Active Problem List   Diagnosis    STEMI (ST elevation myocardial infarction) pk  trop 150 POBA LAD and DIAG med rx.    Cardiomyopathy, nonischemic EF 15% no ascvd, query spasm vs. intracornary thrombus.    Sleep apnea    Narcolepsy with cataplexy    HTN (hypertension)    Abnormal electrocardiogram    Chronic systolic congestive heart failure (HCC)    Dizziness    Male erectile disorder    Mixed hyperlipidemia    CAD (coronary artery disease)    Apical mural thrombus following MI (HCC)    KWESI (acute kidney injury) (HCC)    Elevated CPK    Acute kidney injury superimposed on chronic kidney disease (HCC)    Broken tooth       Surgical History:    Past Surgical History:   Procedure Laterality Date    ZZZ CARDIAC CATH  12/2010    OTHER  tonsilectomy    OTHER CARDIAC SURGERY      catherization 12/2010, 12/2008    TONSILLECTOMY AND ADENOIDECTOMY         Past Social Hx:     Social History     Socioeconomic History    Marital status:    Tobacco Use    Smoking status: Former     Current packs/day: 0.00     Average packs/day: 0.5 packs/day for 15.0 years (7.5 ttl pk-yrs)     Types:  Cigarettes     Start date: 1995     Quit date: 2010     Years since quittin.7    Smokeless tobacco: Never    Tobacco comments:     1/2 pack/day   Vaping Use    Vaping status: Never Used   Substance and Sexual Activity    Alcohol use: Not Currently    Drug use: Yes     Types: Marijuana, Inhaled        Past Family Hx:      Family History   Problem Relation Age of Onset    Sleep Apnea Neg Hx        Problem list, medications, and allergies reviewed by myself today in Epic.     Objective:     Vitals:    24 1521   BP: 120/66   Pulse: 99   Resp: 16   Temp: 36.6 °C (97.8 °F)   SpO2: 98%       Physical Exam  Vitals reviewed.   Constitutional:       General: He is not in acute distress.     Appearance: Normal appearance. He is normal weight. He is not ill-appearing, toxic-appearing or diaphoretic.   HENT:      Head: Normocephalic and atraumatic.      Right Ear: Tympanic membrane, ear canal and external ear normal.      Left Ear: Tympanic membrane, ear canal and external ear normal.      Nose: Nose normal. No congestion or rhinorrhea.      Mouth/Throat:      Mouth: Mucous membranes are moist.      Pharynx: Oropharynx is clear.   Eyes:      Extraocular Movements: Extraocular movements intact.      Conjunctiva/sclera: Conjunctivae normal.      Pupils: Pupils are equal, round, and reactive to light.   Cardiovascular:      Rate and Rhythm: Regular rhythm. Tachycardia present.      Chest Wall: PMI is not displaced. No thrill.      Pulses: Normal pulses. No decreased pulses.      Heart sounds: Heart sounds not distant. Murmur heard.      No systolic murmur is present.      No diastolic murmur is present.      No friction rub. No gallop. No S3 or S4 sounds.   Pulmonary:      Effort: Pulmonary effort is normal. No respiratory distress.      Breath sounds: Normal breath sounds. No stridor. No wheezing, rhonchi or rales.   Abdominal:      General: Abdomen is flat. Bowel sounds are normal. There is distension.       Palpations: Abdomen is soft. There is no shifting dullness, fluid wave, hepatomegaly, splenomegaly, mass or pulsatile mass.      Tenderness: There is no abdominal tenderness. There is no right CVA tenderness, left CVA tenderness, guarding or rebound.      Hernia: No hernia is present.          Comments: Mild tenderness palpation over the right upper quadrant and epigastric regions.  No rebound or guarding   Musculoskeletal:         General: Normal range of motion.      Cervical back: Normal range of motion and neck supple.      Right lower leg: No edema.      Left lower leg: No edema.   Skin:     General: Skin is warm and dry.      Coloration: Skin is pale.   Neurological:      General: No focal deficit present.      Mental Status: He is alert and oriented to person, place, and time. Mental status is at baseline.   Psychiatric:         Mood and Affect: Mood normal.         Behavior: Behavior normal.         Thought Content: Thought content normal.         Judgment: Judgment normal.       X-ray images viewed and interpreted by BIN, confirmed by radiology:        RADIOLOGY RESULTS   DX-CHEST-2 VIEWS    Result Date: 9/13/2024 9/13/2024 4:18 PM HISTORY/REASON FOR EXAM:  Shortness of Breath TECHNIQUE/EXAM DESCRIPTION AND NUMBER OF VIEWS: Two views of the chest. COMPARISON:  12/5/2023 FINDINGS: Cardiac mediastinal contour is unchanged. No focal pulmonary consolidation. No pleural fluid collection or pneumothorax. LEFT chest pacemaker/AICD present. No major bony abnormality is seen.     No acute cardiopulmonary disease.         EKG Interpretation   Interpreted by me   Rhythm: Sinus tachycardia  Rate: normal   Axis: normal   Ectopy: none   Conduction: normal   ST Segments: no acute change   T Waves: no acute change   Q Waves: none   Clinical Impression: no acute changes compared to previous EKG in 12/23.  Demonstrates probable left atrial enlargement, LVH with IVCD and secondary repol.        Assessment/Plan:      Diagnosis and associated orders:     1. Epigastric pain    2. SOB (shortness of breath)  - EKG - Clinic Performed  - DX-CHEST-2 VIEWS          Comments/MDM:     The patient presents to the urgent care for evaluation of sudden, severe epigastric pain.  On physical exam, he has tenderness to palpation over the right upper quadrant and epigastric areas.  He also reports that he has been having worsening shortness of breath with associated diaphoresis.  Denies chest pain, palpitations, lower extremity edema.  EKG was performed in clinic, which does not show any significant changes from his last EKG in 12/2023.  X-ray negative for acute cardiopulmonary abnormality  I engaged in a comprehensive conversation with the patient regarding the differential diagnosis of epigastric abdominal pain.  At this time, it is recommended that she be seen in the ED for a comprehensive evaluation and treatment.  He was advised that he will likely need additional EKG monitoring, labs, and possible advanced imaging of the abdominal area.  Vital stable at the time of transfer.  He would like to be taken via private vehicle driven by his significant other.  Politely declined EMS transfer.  Transfer center called and made aware of upcoming arrival.         All questions answered. Patient verbalized understanding and is in agreement with this plan of care.     If symptoms are worsening or not improving in 3-5 days, follow-up with PCP or return to UC. Differential diagnosis, natural history, and supportive care discussed. AVS handout given and reviewed with patient. Patient educated on red flags and when to seek treatment back in ED or UC.     I personally reviewed prior external notes and test results pertinent to today's visit.  I have independently reviewed and interpreted all diagnostics ordered during this urgent care visit.     This dictation has been created using voice recognition software. The accuracy of the dictation is limited by  the abilities of the software. I expect there may be some errors of grammar and possibly content. I made every attempt to manually correct the errors within my dictation. However, errors related to voice recognition software may still exist and should be interpreted within the appropriate context.    This note was electronically signed by BENI Valle

## 2024-09-14 NOTE — DISCHARGE INSTRUCTIONS
Please follow-up with your primary care doctor in a few days for recheck of your labs to ensure your kidney function is getting better.  Try taking MiraLAX twice daily for the next 3 days to treat your constipation.  Return to the ER with any more severe abdominal pain, chest pain, worsening shortness of breath, dizziness, lightheadedness, if you pass out or otherwise feeling worse.

## 2024-09-14 NOTE — ED TRIAGE NOTES
".  Chief Complaint   Patient presents with    Abdominal Pain     Pt had 10/10 twisting lower abdominal pain that started 2 days ago that went away after about 45 minutes.   Pt states they took bite of sandwich today when they got the the same 10/10 twisting lower abdominal pain     Pt denies N/V/D  Hx kidney failure, pt states pain is similar      .BP (!) 136/96   Pulse (!) 105   Temp 36.6 °C (97.8 °F) (Temporal)   Resp 16   Ht 1.676 m (5' 6\")   Wt 71.5 kg (157 lb 10.1 oz)   SpO2 95%   BMI 25.44 kg/m²       .Patient ambulatory to triage for above complaint. Patient aware to notify RN of any changes in symptoms. Patient educated on triage process. A&O x 4 and placed outside phlebotomy for lab draw    "

## 2024-09-14 NOTE — ED PROVIDER NOTES
ED Provider Note    CHIEF COMPLAINT  Chief Complaint   Patient presents with    Abdominal Pain     Pt had 10/10 twisting lower abdominal pain that started 2 days ago that went away after about 45 minutes.   Pt states they took bite of sandwich today when they got the the same 10/10 twisting lower abdominal pain     Pt denies N/V/D  Hx kidney failure, pt states pain is similar        EXTERNAL RECORDS REVIEWED  Outpatient Notes patient was seen by family practitioner today prior to arrival for evaluation of abdominal pain and was sent to the emergency department for further evaluation.    HPI/ROS  LIMITATION TO HISTORY   Select: : None      Johny Toledo is a 54 y.o. male who presents to the emergency department for evaluation of 2 days of waxing and waning cramping abdominal pain.  Pain occurs after the patient eats and is migratory throughout his abdomen and associated with lightheadedness and diaphoresis.  Patient reports constipation for the last week after getting back from a vacation where and he was eating quite heavily.  Reports that he has had bowel movements but they have been small kernels.  Also reports that he feels dehydrated because he is continuing to take his prescribed diuretics and when he drinks water he feels bloated.  He reports some associated shortness of breath made worse by bloating.  He denies any fever cough, vomiting or diarrhea, urinary symptoms.  He denies any chest pain or pressure.    PAST MEDICAL HISTORY   has a past medical history of Abnormal electrocardiogram (7/27/2011), Bronchitis, Chronic systolic congestive heart failure (HCC) (7/27/2011), Congestive heart failure (HCC), Dizziness (3/31/2011), Fall, Heart valve disease, Hyperlipidemia, Hypertension, Male erectile disorder (8/9/2011), Mixed hyperlipidemia (8/9/2011), Narcolepsy with cataplexy, Pulmonary embolism (HCC), and STEMI (ST elevation myocardial infarction) pk  trop 150 POBA LAD and DIAG med rx.  "(2010).    SURGICAL HISTORY   has a past surgical history that includes other (tonsilectomy); other cardiac surgery; zzz cardiac cath (2010); and tonsillectomy and adenoidectomy.    FAMILY HISTORY  Family History   Problem Relation Age of Onset    Sleep Apnea Neg Hx        SOCIAL HISTORY  Social History     Tobacco Use    Smoking status: Former     Current packs/day: 0.00     Average packs/day: 0.5 packs/day for 15.0 years (7.5 ttl pk-yrs)     Types: Cigarettes     Start date: 1995     Quit date: 2010     Years since quittin.7    Smokeless tobacco: Never    Tobacco comments:     1/2 pack/day   Vaping Use    Vaping status: Never Used   Substance and Sexual Activity    Alcohol use: Not Currently    Drug use: Yes     Types: Marijuana, Inhaled    Sexual activity: Not on file       CURRENT MEDICATIONS  Home Medications       Reviewed by Hannah Hernandez R.N. (Registered Nurse) on 24 at 1747  Med List Status: Partial     Medication Last Dose Status   amLODIPine (NORVASC) 5 MG Tab  Active   amphetamine-dextroamphetamine ER (ADDERALL XR, 30MG,) 30 MG XR capsule  Active   atorvastatin (LIPITOR) 80 MG tablet  Active   carvedilol (COREG) 25 MG Tab  Active   ezetimibe (ZETIA) 10 MG Tab  Active   oxymetazoline (AFRIN 12 HOUR) 0.05 % Solution  Active                    ALLERGIES  No Known Allergies    PHYSICAL EXAM  VITAL SIGNS: /89   Pulse 98   Temp 36.6 °C (97.8 °F) (Temporal)   Resp 16   Ht 1.676 m (5' 6\")   Wt 71.5 kg (157 lb 10.1 oz)   SpO2 95%   BMI 25.44 kg/m²    Constitutional: No acute distress, pleasant and well-appearing  HEENT: Atraumatic, normocephalic, pupils are equal round reactive to light, nose normal, mouth shows dry mucous membranes  Neck: Supple, no JVD, no tracheal deviation  Cardiovascular: Regular rate and rhythm, no murmur, rub or gallop, 2+ pulses peripherally x4  Thorax & Lungs: No respiratory distress, no wheezes, rales or rhonchi, no chest wall " tenderness.  GI: Soft, minimally distended but nontender, no rebound or guarding  Skin: Warm, dry, no acute rash or lesion  Musculoskeletal: Moving all extremities, no acute deformity, no edema, no tenderness  Neurologic: A&Ox3, at baseline mentation  Psychiatric: Appropriate affect for situation at this time          EKG/LABS  Labs Reviewed   CBC WITH DIFFERENTIAL - Abnormal; Notable for the following components:       Result Value    RBC 4.63 (*)     All other components within normal limits   COMP METABOLIC PANEL - Abnormal; Notable for the following components:    Glucose 103 (*)     Bun 36 (*)     Creatinine 2.37 (*)     Globulin 3.7 (*)     All other components within normal limits   ESTIMATED GFR - Abnormal; Notable for the following components:    GFR (CKD-EPI) 32 (*)     All other components within normal limits   PROBRAIN NATRIURETIC PEPTIDE, NT - Abnormal; Notable for the following components:    NT-proBNP 8385 (*)     All other components within normal limits   LIPASE   TROPONIN   URINALYSIS     Results for orders placed or performed during the hospital encounter of 24   EKG   Result Value Ref Range    Report       [facility]    Test Date:  2024  Pt Name:    LAVELL SPRINGER                 Department: ER  MRN:        [id number]                      Room:  Gender:     Male                         Technician: [id number]  :        1970                   Requested By:ER TRIAGE PROTOCOL  Order #:    [id number]                    Junior MOLINA: Alexander Flores    Measurements  Intervals                                Axis  Rate:       96                           P:          60  DE:         171                          QRS:        -11  QRSD:       113                          T:          144  QT:         397  QTc:        502    Interpretive Statements  Sinus rhythm rate of 96, LVH with secondary repolarization abnormality not  significantly changed from prior  EKG.  Electronically Signed On 09- 19:55:50 PDT by Alexander Flores         I have independently interpreted this EKG    RADIOLOGY/PROCEDURES   I have independently interpreted the diagnostic imaging associated with this visit and am waiting the final reading from the radiologist.   My preliminary interpretation is as follows: Abdominal x-ray demonstrating changes of constipation.  Chest x-ray with no pulmonary edema or evidence of pneumonia    Radiologist interpretation:  [id number] VIEW   Final Result      1.  Mild to moderate constipation.      DX-CHEST-PORTABLE (1 VIEW)   Final Result      No acute cardiac or pulmonary abnormalities are identified.          COURSE & MEDICAL DECISION MAKING    ASSESSMENT, COURSE AND PLAN  Care Narrative:     Patient presents to the emergency department for evaluation of migratory cramping abdominal pain, abdominal distention and constipation.  He is well-appearing at the time of my evaluation.  Abdomen is slightly distended but soft and nontender.  Patient does have a remarkable history of heart failure, chronic kidney disease.  No prior abdominal surgeries.  Differential diagnosis considered includes bowel obstruction, appendicitis, cholecystitis, pancreatitis, constipation.  Feel serious surgical cause of pain is unlikely however given his examination is quite reassuring, symptom description typical of constipation related bowel colic.  Lab work was obtained demonstrating no leukocytosis such as would be expected in the setting of serious intra-abdominal pathology, normal lipase argues against pancreatitis.  CMP with slight worsening in baseline renal function which I suspect to be secondary to dehydration given his poor p.o. intake and clinical evidence of dehydration on exam.  Given such a fluid bolus was provided.  Chest x-ray with no developing pulmonary edema and clinically has no evidence of peripheral edema or fluid overload.  His BNP is elevated which in this  case is somewhat nonspecific given no additional clinical features suggestive of hypervolemia.  Troponin negative which argues against cardiac ischemia as the atypical etiology of his symptoms.  Patient symptomatically improved after fluid bolus.  Discussed with him my recommendation that we trial 3-day bowel regiment with a plan for outpatient primary care and cardiology follow-up to ensure he is not developing any worsening of his heart failure.  At this point no clinical indication for admission given well-controlled symptoms, no increased work of breathing, normal vital signs.  Return precautions discussed all questions answered he was discharged in stable condition.    Hydration: Based on the patient's presentation of Dehydration the patient was given IV fluids. IV Hydration was used because oral hydration was not adequate alone. Upon recheck following hydration, the patient was improved.          ADDITIONAL PROBLEMS MANAGED  KWESI    DISPOSITION AND DISCUSSIONS  I have discussed management of the patient with the following physicians and RUMA's: None    Discussion of management with other QHP or appropriate source(s): None     Escalation of care considered, and ultimately not performed:acute inpatient care management, however at this time, the patient is most appropriate for outpatient management    FINAL DIAGNOSIS  1. Constipation, unspecified constipation type    2. Abdominal cramping    3. KWESI (acute kidney injury) (HCC)    4. Dehydration         Electronically signed by: Alexander Flores M.D., 9/13/2024 7:41 PM

## 2024-09-14 NOTE — ED NOTES
RN agree with trigae. Pt on continuous monitoring. Call light in reach.   Pt c/o of lower abd pain. -dysuria. -N/V/d.

## 2024-09-14 NOTE — ED NOTES
Per ERP verbal order, no longer need urine sample for lab.   Pending IVF to finish. Pt updated on POC

## 2025-01-04 ENCOUNTER — HOSPITAL ENCOUNTER (OUTPATIENT)
Dept: RADIOLOGY | Facility: MEDICAL CENTER | Age: 55
End: 2025-01-04
Attending: PHYSICIAN ASSISTANT
Payer: COMMERCIAL

## 2025-01-04 ENCOUNTER — OFFICE VISIT (OUTPATIENT)
Dept: URGENT CARE | Facility: PHYSICIAN GROUP | Age: 55
End: 2025-01-04
Payer: COMMERCIAL

## 2025-01-04 VITALS
HEART RATE: 107 BPM | TEMPERATURE: 98.6 F | RESPIRATION RATE: 16 BRPM | OXYGEN SATURATION: 96 % | BODY MASS INDEX: 25.51 KG/M2 | SYSTOLIC BLOOD PRESSURE: 128 MMHG | WEIGHT: 158.73 LBS | HEIGHT: 66 IN | DIASTOLIC BLOOD PRESSURE: 74 MMHG

## 2025-01-04 DIAGNOSIS — R06.02 SHORTNESS OF BREATH: ICD-10-CM

## 2025-01-04 DIAGNOSIS — R05.1 ACUTE COUGH: ICD-10-CM

## 2025-01-04 PROCEDURE — 71046 X-RAY EXAM CHEST 2 VIEWS: CPT

## 2025-01-04 PROCEDURE — 3078F DIAST BP <80 MM HG: CPT | Performed by: PHYSICIAN ASSISTANT

## 2025-01-04 PROCEDURE — 3074F SYST BP LT 130 MM HG: CPT | Performed by: PHYSICIAN ASSISTANT

## 2025-01-04 PROCEDURE — 99214 OFFICE O/P EST MOD 30 MIN: CPT | Performed by: PHYSICIAN ASSISTANT

## 2025-01-04 RX ORDER — PREDNISONE 20 MG/1
TABLET ORAL
Qty: 8 TABLET | Refills: 0 | Status: SHIPPED | OUTPATIENT
Start: 2025-01-04

## 2025-01-04 RX ORDER — ALBUTEROL SULFATE 90 UG/1
2 INHALANT RESPIRATORY (INHALATION) EVERY 6 HOURS PRN
Qty: 8.5 G | Refills: 0 | Status: SHIPPED | OUTPATIENT
Start: 2025-01-04

## 2025-01-04 RX ORDER — EMPAGLIFLOZIN 10 MG/1
10 TABLET, FILM COATED ORAL DAILY
COMMUNITY

## 2025-01-04 RX ORDER — FUROSEMIDE 20 MG/1
20 TABLET ORAL DAILY
COMMUNITY

## 2025-01-04 ASSESSMENT — FIBROSIS 4 INDEX: FIB4 SCORE: 0.92

## 2025-01-04 ASSESSMENT — ENCOUNTER SYMPTOMS
COUGH: 1
FEVER: 0
SHORTNESS OF BREATH: 1
CHILLS: 0

## 2025-01-05 NOTE — PROGRESS NOTES
"Subjective     Johny Toledo is a 54 y.o. male who presents with Shortness of Breath (Cough ,can't sleep hard to breathe ,constant ,x 4  inhaler not working )    HPI:  Johny Toledo is a 54 y.o. male who presents today for evaluation of cough and shortness of breath.  Says that he was sick about 2 weeks ago with what he thought was flulike symptoms.  Was feeling better with that over the past few days as needed cough with shortness of breath.  Says that he feels as though \"all the air is escaping him\" whenever he tries to take a deep breath or lay down.  He also notes that he cannot speak for any prolonged amount of time without getting into significant coughing fits.  He has not had any fever.  He does have a history of Congestive heart failure, heart valve disease, STEMI, hypertension, and pacemaker with AICD placement.  Just recently followed up with his nephrologist and saw his cardiologist last month.  He notes that when his shortness of breath started to worsen a few days ago he started taking his furosemide but has not noticed much improvement.  He also found an old inhaler which he has tried without any relief.        Review of Systems   Constitutional:  Positive for malaise/fatigue. Negative for chills and fever.   Respiratory:  Positive for cough and shortness of breath.    Cardiovascular:  Positive for chest pain (while coughing).         PMH:  has a past medical history of Abnormal electrocardiogram (7/27/2011), Bronchitis, Chronic systolic congestive heart failure (HCC) (7/27/2011), Congestive heart failure (HCC), Dizziness (3/31/2011), Fall, Heart valve disease, Hyperlipidemia, Hypertension, Male erectile disorder (8/9/2011), Mixed hyperlipidemia (8/9/2011), Narcolepsy with cataplexy, Pulmonary embolism (HCC), and STEMI (ST elevation myocardial infarction) pk  trop 150 POBA LAD and DIAG med rx. (12/18/2010).  MEDS:   Current Outpatient Medications:     furosemide (LASIX) 20 MG Tab, Take 20 mg " by mouth every day., Disp: , Rfl:     JARDIANCE 10 MG Tab tablet, Take 10 mg by mouth every day., Disp: , Rfl:     Sacubitril-Valsartan (ENTRESTO PO), Take  by mouth., Disp: , Rfl:     predniSONE (DELTASONE) 20 MG Tab, Take 2 tabs once daily for 4 days, Disp: 8 Tablet, Rfl: 0    albuterol 108 (90 Base) MCG/ACT Aero Soln inhalation aerosol, Inhale 2 Puffs every 6 hours as needed for Shortness of Breath., Disp: 8.5 g, Rfl: 0    atorvastatin (LIPITOR) 80 MG tablet, Take 80 mg by mouth every day., Disp: , Rfl:     amphetamine-dextroamphetamine ER (ADDERALL XR, 30MG,) 30 MG XR capsule, Take 30 mg by mouth 2 times a day as needed (Narcolepsy)., Disp: , Rfl:     amLODIPine (NORVASC) 5 MG Tab, Take 5 mg by mouth 2 times a day., Disp: , Rfl:     carvedilol (COREG) 25 MG Tab, Take 25 mg by mouth 2 times a day, with meals., Disp: , Rfl:     oxymetazoline (AFRIN 12 HOUR) 0.05 % Solution, Administer 2-3 Sprays into affected nostril(S) 2 times a day as needed for Congestion. (Patient not taking: Reported on 9/13/2024), Disp: , Rfl:     ezetimibe (ZETIA) 10 MG Tab, Take 10 mg by mouth every day. (Patient not taking: Reported on 9/13/2024), Disp: , Rfl:   ALLERGIES: No Known Allergies  SURGHX:   Past Surgical History:   Procedure Laterality Date    ZZZ CARDIAC CATH  12/2010    OTHER  tonsilectomy    OTHER CARDIAC SURGERY      catherization 12/2010, 12/2008    TONSILLECTOMY AND ADENOIDECTOMY       SOCHX:  reports that he quit smoking about 14 years ago. His smoking use included cigarettes. He started smoking about 29 years ago. He has a 7.5 pack-year smoking history. He has never used smokeless tobacco. He reports that he does not currently use alcohol. He reports that he does not currently use drugs after having used the following drugs: Marijuana and Inhaled.  FH: Family history was reviewed, no pertinent findings to report      Objective     /74   Pulse (!) 107   Temp 37 °C (98.6 °F) (Temporal)   Resp 16   Ht 1.676 m  "(5' 6\")   Wt 72 kg (158 lb 11.7 oz)   SpO2 96%   BMI 25.62 kg/m²      Physical Exam  Constitutional:       Appearance: He is well-developed.   HENT:      Head: Normocephalic and atraumatic.      Right Ear: Tympanic membrane, ear canal and external ear normal.      Left Ear: Tympanic membrane, ear canal and external ear normal.      Nose: Mucosal edema present.      Mouth/Throat:      Lips: Pink.      Mouth: Mucous membranes are moist.      Pharynx: Oropharynx is clear.   Eyes:      Conjunctiva/sclera: Conjunctivae normal.      Pupils: Pupils are equal, round, and reactive to light.   Cardiovascular:      Rate and Rhythm: Normal rate and regular rhythm.      Heart sounds: Murmur heard.      Comments: No leg swelling or calf tenderness noted  Pulmonary:      Effort: Pulmonary effort is normal.      Breath sounds: Normal breath sounds. No decreased breath sounds, wheezing, rhonchi or rales.      Comments: During the exam he cannot complete a full sentence without going into bronchospasm/coughing fit.   Musculoskeletal:      Cervical back: Normal range of motion.      Right lower leg: No edema.      Left lower leg: No edema.   Lymphadenopathy:      Cervical: No cervical adenopathy.   Skin:     General: Skin is warm and dry.      Capillary Refill: Capillary refill takes less than 2 seconds.   Neurological:      Mental Status: He is alert and oriented to person, place, and time.   Psychiatric:         Behavior: Behavior normal.         Judgment: Judgment normal.           DX-CHEST-2 VIEWS  FINDINGS:  The heart is normal in size.  No pulmonary infiltrates or consolidations are noted.  No pleural effusions are appreciated.  There is no change in cardiac pacemaker.     IMPRESSION:  No active disease.      *X-rays were reviewed and interpreted independently by me. I agree with the radiologist's findings     Assessment & Plan     1. Acute cough  - DX-CHEST-2 VIEWS; Future  - predniSONE (DELTASONE) 20 MG Tab; Take 2 tabs " once daily for 4 days  Dispense: 8 Tablet; Refill: 0  - albuterol 108 (90 Base) MCG/ACT Aero Soln inhalation aerosol; Inhale 2 Puffs every 6 hours as needed for Shortness of Breath.  Dispense: 8.5 g; Refill: 0    2. Shortness of breath  - DX-CHEST-2 VIEWS; Future  - predniSONE (DELTASONE) 20 MG Tab; Take 2 tabs once daily for 4 days  Dispense: 8 Tablet; Refill: 0  - albuterol 108 (90 Base) MCG/ACT Aero Soln inhalation aerosol; Inhale 2 Puffs every 6 hours as needed for Shortness of Breath.  Dispense: 8.5 g; Refill: 0      Patient presents to the urgent care today for concerns of cough and shortness of breath.  He is vital signs are stable, lungs are CTAB.  During the exam he cannot complete a full sentence without going into bronchospasm/coughing fit.  Chest x-ray obtained which was negative for any acute cardiopulmonary process.  Symptoms are likely secondary to new onset viral URI.  Given the significance of his shortness of breath and bronchospasm we will treat with a short burst of oral prednisone.  Albuterol inhaler also refilled as he could not tell me for sure if his other 1 was  or not.  If symptoms worsen he was directed to go to the emergency department for higher level of care.  If symptoms do not worsen but fail to improve after 4 to 5 days he can return for reevaluation or follow-up with his primary care provider.        Differential Diagnosis, natural history, and supportive care discussed. Return to the Urgent Care or follow up with your PCP if symptoms fail to resolve, or for any new or worsening symptoms. Emergency room precautions discussed. Patient and/or family appears understanding of information.

## 2025-03-29 ENCOUNTER — HOSPITAL ENCOUNTER (OUTPATIENT)
Dept: LAB | Facility: MEDICAL CENTER | Age: 55
End: 2025-03-29
Attending: INTERNAL MEDICINE
Payer: COMMERCIAL

## 2025-03-29 LAB
ALBUMIN SERPL BCP-MCNC: 4.2 G/DL (ref 3.2–4.9)
BASOPHILS # BLD AUTO: 0.8 % (ref 0–1.8)
BASOPHILS # BLD: 0.07 K/UL (ref 0–0.12)
BUN SERPL-MCNC: 31 MG/DL (ref 8–22)
CALCIUM ALBUM COR SERPL-MCNC: 9.4 MG/DL (ref 8.5–10.5)
CALCIUM SERPL-MCNC: 9.6 MG/DL (ref 8.5–10.5)
CHLORIDE SERPL-SCNC: 103 MMOL/L (ref 96–112)
CO2 SERPL-SCNC: 24 MMOL/L (ref 20–33)
CREAT SERPL-MCNC: 2.03 MG/DL (ref 0.5–1.4)
EOSINOPHIL # BLD AUTO: 0.3 K/UL (ref 0–0.51)
EOSINOPHIL NFR BLD: 3.3 % (ref 0–6.9)
ERYTHROCYTE [DISTWIDTH] IN BLOOD BY AUTOMATED COUNT: 45.5 FL (ref 35.9–50)
GFR SERPLBLD CREATININE-BSD FMLA CKD-EPI: 38 ML/MIN/1.73 M 2
GLUCOSE SERPL-MCNC: 92 MG/DL (ref 65–99)
HCT VFR BLD AUTO: 48.4 % (ref 42–52)
HGB BLD-MCNC: 16.4 G/DL (ref 14–18)
IMM GRANULOCYTES # BLD AUTO: 0.03 K/UL (ref 0–0.11)
IMM GRANULOCYTES NFR BLD AUTO: 0.3 % (ref 0–0.9)
LYMPHOCYTES # BLD AUTO: 2.68 K/UL (ref 1–4.8)
LYMPHOCYTES NFR BLD: 29.9 % (ref 22–41)
MCH RBC QN AUTO: 31.5 PG (ref 27–33)
MCHC RBC AUTO-ENTMCNC: 33.9 G/DL (ref 32.3–36.5)
MCV RBC AUTO: 92.9 FL (ref 81.4–97.8)
MONOCYTES # BLD AUTO: 0.77 K/UL (ref 0–0.85)
MONOCYTES NFR BLD AUTO: 8.6 % (ref 0–13.4)
NEUTROPHILS # BLD AUTO: 5.11 K/UL (ref 1.82–7.42)
NEUTROPHILS NFR BLD: 57.1 % (ref 44–72)
NRBC # BLD AUTO: 0 K/UL
NRBC BLD-RTO: 0 /100 WBC (ref 0–0.2)
PHOSPHATE SERPL-MCNC: 3 MG/DL (ref 2.5–4.5)
PLATELET # BLD AUTO: 278 K/UL (ref 164–446)
PMV BLD AUTO: 10.2 FL (ref 9–12.9)
POTASSIUM SERPL-SCNC: 4 MMOL/L (ref 3.6–5.5)
PTH-INTACT SERPL-MCNC: 110 PG/ML (ref 14–72)
RBC # BLD AUTO: 5.21 M/UL (ref 4.7–6.1)
SODIUM SERPL-SCNC: 141 MMOL/L (ref 135–145)
WBC # BLD AUTO: 9 K/UL (ref 4.8–10.8)

## 2025-03-29 PROCEDURE — 83970 ASSAY OF PARATHORMONE: CPT

## 2025-03-29 PROCEDURE — 36415 COLL VENOUS BLD VENIPUNCTURE: CPT

## 2025-03-29 PROCEDURE — 85025 COMPLETE CBC W/AUTO DIFF WBC: CPT

## 2025-03-29 PROCEDURE — 80069 RENAL FUNCTION PANEL: CPT

## 2025-04-13 ENCOUNTER — HOSPITAL ENCOUNTER (EMERGENCY)
Facility: MEDICAL CENTER | Age: 55
End: 2025-04-13
Attending: STUDENT IN AN ORGANIZED HEALTH CARE EDUCATION/TRAINING PROGRAM
Payer: COMMERCIAL

## 2025-04-13 ENCOUNTER — APPOINTMENT (OUTPATIENT)
Dept: RADIOLOGY | Facility: MEDICAL CENTER | Age: 55
End: 2025-04-13
Attending: STUDENT IN AN ORGANIZED HEALTH CARE EDUCATION/TRAINING PROGRAM
Payer: COMMERCIAL

## 2025-04-13 VITALS
BODY MASS INDEX: 25.3 KG/M2 | DIASTOLIC BLOOD PRESSURE: 86 MMHG | OXYGEN SATURATION: 90 % | TEMPERATURE: 98.4 F | RESPIRATION RATE: 16 BRPM | HEART RATE: 79 BPM | WEIGHT: 157.41 LBS | SYSTOLIC BLOOD PRESSURE: 145 MMHG | HEIGHT: 66 IN

## 2025-04-13 DIAGNOSIS — M10.9 ACUTE GOUT INVOLVING TOE OF RIGHT FOOT, UNSPECIFIED CAUSE: ICD-10-CM

## 2025-04-13 PROCEDURE — 700111 HCHG RX REV CODE 636 W/ 250 OVERRIDE (IP): Performed by: STUDENT IN AN ORGANIZED HEALTH CARE EDUCATION/TRAINING PROGRAM

## 2025-04-13 PROCEDURE — A9270 NON-COVERED ITEM OR SERVICE: HCPCS | Performed by: STUDENT IN AN ORGANIZED HEALTH CARE EDUCATION/TRAINING PROGRAM

## 2025-04-13 PROCEDURE — 700102 HCHG RX REV CODE 250 W/ 637 OVERRIDE(OP): Performed by: STUDENT IN AN ORGANIZED HEALTH CARE EDUCATION/TRAINING PROGRAM

## 2025-04-13 PROCEDURE — 99283 EMERGENCY DEPT VISIT LOW MDM: CPT

## 2025-04-13 PROCEDURE — 73660 X-RAY EXAM OF TOE(S): CPT | Mod: RT

## 2025-04-13 RX ORDER — OXYCODONE AND ACETAMINOPHEN 5; 325 MG/1; MG/1
1 TABLET ORAL EVERY 4 HOURS PRN
Qty: 6 TABLET | Refills: 0 | Status: SHIPPED | OUTPATIENT
Start: 2025-04-13 | End: 2025-04-20

## 2025-04-13 RX ORDER — METHYLPREDNISOLONE 4 MG/1
TABLET ORAL
Qty: 21 EACH | Refills: 0 | Status: SHIPPED | OUTPATIENT
Start: 2025-04-13

## 2025-04-13 RX ORDER — OXYCODONE AND ACETAMINOPHEN 5; 325 MG/1; MG/1
1 TABLET ORAL ONCE
Refills: 0 | Status: COMPLETED | OUTPATIENT
Start: 2025-04-13 | End: 2025-04-13

## 2025-04-13 RX ORDER — DEXAMETHASONE SODIUM PHOSPHATE 4 MG/ML
12 INJECTION, SOLUTION INTRA-ARTICULAR; INTRALESIONAL; INTRAMUSCULAR; INTRAVENOUS; SOFT TISSUE ONCE
Status: COMPLETED | OUTPATIENT
Start: 2025-04-13 | End: 2025-04-13

## 2025-04-13 RX ADMIN — DEXAMETHASONE SODIUM PHOSPHATE 12 MG: 4 INJECTION INTRA-ARTICULAR; INTRALESIONAL; INTRAMUSCULAR; INTRAVENOUS; SOFT TISSUE at 12:27

## 2025-04-13 RX ADMIN — OXYCODONE AND ACETAMINOPHEN 1 TABLET: 5; 325 TABLET ORAL at 11:16

## 2025-04-13 ASSESSMENT — PAIN DESCRIPTION - PAIN TYPE: TYPE: ACUTE PAIN

## 2025-04-13 ASSESSMENT — FIBROSIS 4 INDEX: FIB4 SCORE: 0.92

## 2025-04-13 NOTE — ED PROVIDER NOTES
ED Provider Note    CHIEF COMPLAINT  Chief Complaint   Patient presents with    Foot Pain     Right foot pain. Denies trauma to the foot. Px thinks it might be gout. Describes pain as throbbing in his joints, unable to wiggle his toes. Right big toe appears red and swollen.        EXTERNAL RECORDS REVIEWED  Outpatient Notes office visit on 1/8/2025 for renal insufficiency and congestive heart failure and coronary atherosclerosis, hypertension, hyperlipidemia, mitral valve regurgitation    HPI/ROS  LIMITATION TO HISTORY   Select: : None  OUTSIDE HISTORIAN(S):    Johny Toledo is a 54 y.o. male who presents with right foot pain.  Patient denies trauma.  Patient reports that he likes to eat meat.  Patient does not drink.  Patient reports pain with bearing weight, wiggling the toe.  Patient denies fever chills body aches or sweats.  Patient denies history of anything similar.  Patient denies numbness.    PAST MEDICAL HISTORY   has a past medical history of Abnormal electrocardiogram (7/27/2011), Bronchitis, Chronic systolic congestive heart failure (HCC) (7/27/2011), Congestive heart failure (HCC), Dizziness (3/31/2011), Fall, Heart valve disease, Hyperlipidemia, Hypertension, Male erectile disorder (8/9/2011), Mixed hyperlipidemia (8/9/2011), Narcolepsy with cataplexy, Pulmonary embolism (HCC), and STEMI (ST elevation myocardial infarction) pk  trop 150 POBA LAD and DIAG med rx. (12/18/2010).    SURGICAL HISTORY   has a past surgical history that includes other (tonsilectomy); other cardiac surgery; zzz cardiac cath (12/2010); and tonsillectomy and adenoidectomy.    FAMILY HISTORY  Family History   Problem Relation Age of Onset    Sleep Apnea Neg Hx        SOCIAL HISTORY  Social History     Tobacco Use    Smoking status: Former     Current packs/day: 0.00     Average packs/day: 0.5 packs/day for 15.0 years (7.5 ttl pk-yrs)     Types: Cigarettes     Start date: 12/2/1995     Quit date: 12/2/2010     Years since  "quittin.3    Smokeless tobacco: Never    Tobacco comments:     1/2 pack/day   Vaping Use    Vaping status: Never Used   Substance and Sexual Activity    Alcohol use: Not Currently    Drug use: Not Currently     Types: Marijuana, Inhaled    Sexual activity: Not on file       CURRENT MEDICATIONS  Home Medications       Reviewed by Deborah Hull R.N. (Registered Nurse) on 25 at 0928  Med List Status: Partial     Medication Last Dose Status   albuterol 108 (90 Base) MCG/ACT Aero Soln inhalation aerosol  Active   amLODIPine (NORVASC) 5 MG Tab  Active   amphetamine-dextroamphetamine ER (ADDERALL XR, 30MG,) 30 MG XR capsule  Active   atorvastatin (LIPITOR) 80 MG tablet  Active   carvedilol (COREG) 25 MG Tab  Active   ezetimibe (ZETIA) 10 MG Tab  Active   furosemide (LASIX) 20 MG Tab  Active   JARDIANCE 10 MG Tab tablet  Active   oxymetazoline (AFRIN 12 HOUR) 0.05 % Solution  Active   predniSONE (DELTASONE) 20 MG Tab  Active   Sacubitril-Valsartan (ENTRESTO PO)  Active                  Audit from Redirected Encounters    **Home medications have not yet been reviewed for this encounter**         ALLERGIES  No Known Allergies    PHYSICAL EXAM  VITAL SIGNS: BP (!) 146/90   Pulse 82   Temp 36.7 °C (98 °F) (Temporal)   Resp 16   Ht 1.676 m (5' 6\")   Wt 71.4 kg (157 lb 6.5 oz)   SpO2 93%   BMI 25.41 kg/m²    Vitals and nursing note reviewed.   Constitutional:       Comments: Patient is lying in bed supine, pleasant, conversant, speaking in complete sentences   HENT:      Head: Normocephalic and atraumatic.   Eyes:      Extraocular Movements: Extraocular movements intact.      Conjunctiva/sclera: Conjunctivae normal.      Pupils: Pupils are equal, round, and reactive to light.   Cardiovascular:      Pulses: Normal pulses.      Comments:   Pulmonary:      Effort: Pulmonary effort is normal. No respiratory distress.   Musculoskeletal:      Tenderness to the right great toe, movement limited by pain, mild " erythema, no crepitus, no warmth, no purulent drainage, 2+ DP pulse.     Cervical back: Normal range of motion. No rigidity.   Skin:     General: Skin is warm and dry.      Capillary Refill: Capillary refill takes less than 2 seconds.   Neurological:      Mental Status: Alert.     RADIOLOGY/PROCEDURES   I have independently interpreted the diagnostic imaging associated with this visit and am waiting the final reading from the radiologist.   My preliminary interpretation is as follows: No fracture    Radiologist interpretation:  DX-TOE(S) 2+ RIGHT   Final Result         1. No abnormality of the toes evident.      2. Early diabetic type vascular calcification.                         COURSE & MEDICAL DECISION MAKING    ASSESSMENT, COURSE AND PLAN  Care Narrative: Cellulitis less likely at this time, patient afebrile, toe was not warm, minimal erythema.  No evidence of necrotizing soft tissue infection or abscess.  No evidence of neurovascular compromise.  X-ray to rule out fracture.  Patient cannot take NSAIDs due to renal insufficiency.    Electronically signed by: Torey Oswald M.D., 4/13/2025 10:19 AM    X-ray imaging demonstrates no evidence of acute osseous injury.  Patient counseled to hold furosemide due to concern for gout.  Patient given dexamethasone and Percocet for pain control.  Patient discharged with steroids as well.  Patient reports pain is improved significantly.  No evidence of infection on repeat exam, patient counseled to return for redness, pus, drainage, swelling, fever.    Repeat physical exam benign.  I doubt any serious emergency process at this time.  Patient and/or family, friends given strict return precautions for worsening symptoms and care instructions. They have demonstrated understanding of discharge instructions through teach back mechanism. Advised PCP follow-up in 1-2 days.  Patient/family/friend expresses understanding and agrees to plan.    This dictation has been created  using voice recognition software. I am continuously working with the software to minimize the number of voice recognition errors and I have made every attempt to manually correct the errors within my dictation. However errors  related to this voice recognition software may still exist and should be interpreted within the appropriate context.     Electronically signed by: Torey Oswald M.D., 4/13/2025 2:38 PM      Narcotics Script: In prescribing controlled substances to this patient, I certify that I have obtained and reviewed the medical history of Johny Toledo. I have also made a good jeffry effort to obtain applicable records from other providers who have treated the patient and records did not demonstrate any increased risk of substance abuse that would prevent me from prescribing controlled substances.     I have conducted a physical exam and documented it. I have reviewed Mr. Toledo’s prescription history as maintained by the [place] Prescription Monitoring Program.     I have assessed the patient’s risk for abuse, dependency, and addiction using the validated Opioid Risk Tool available at https://www.mdcalc.com/ihqzvu-eojp-xjge-ort-narcotic-abuse.     Given the above, I believe the benefits of controlled substance therapy outweigh the risks. The reasons for prescribing controlled substances include in my professional opinion, controlled substances are the only reasonable choice for this patient because of severe pain . Accordingly, I have discussed the risk and benefits, treatment plan, and alternative therapies with the patient.       DISPOSITION AND DISCUSSIONS  Escalation of care considered, and ultimately not performed:Laboratory analysis    FINAL DIAGNOSIS  1. Acute gout involving toe of right foot, unspecified cause         Electronically signed by: Torey Oswald M.D., 4/13/2025 10:17 AM

## 2025-04-13 NOTE — ED NOTES
Medicated for pain. Right great toe throbbing 8/10 pain   [de-identified] : Cough [FreeTextEntry6] : >1 week ago w/ dry/ persistent cough No fever Decreased energy Last night taken to [facility] [place] Pulse ox 96%--> given albuterol and dexamethasone Pulse ox overnight when asleep was 91% Family hx of asthma Cough improved

## 2025-04-13 NOTE — ED TRIAGE NOTES
"Chief Complaint   Patient presents with    Foot Pain     Right foot pain. Denies trauma to the foot. Px thinks it might be gout. Describes pain as throbbing in his joints, unable to wiggle his toes. Right big toe appears red and swollen.        55 yo male to triage for above complaint. Ambulatory.     Pt is alert and oriented, speaking in full sentences, follows commands and responds appropriately to questions.     Patient placed back in lobby and educated on triage process. Asked to inform RN of any changes.    BP (!) 143/104   Pulse (!) 108   Temp 36.7 °C (98 °F) (Temporal)   Resp 16   Ht 1.676 m (5' 6\")   Wt 71.4 kg (157 lb 6.5 oz)   SpO2 96%   BMI 25.41 kg/m²     "

## 2025-04-13 NOTE — ED NOTES
Discharged to lobby pt calling for ride. Understands to follow up with pcp. Prescription x 2 with indication sent to pharmacy.

## 2025-04-17 ENCOUNTER — HOSPITAL ENCOUNTER (INPATIENT)
Facility: MEDICAL CENTER | Age: 55
LOS: 3 days | DRG: 291 | End: 2025-04-20
Attending: EMERGENCY MEDICINE
Payer: COMMERCIAL

## 2025-04-17 ENCOUNTER — APPOINTMENT (OUTPATIENT)
Dept: RADIOLOGY | Facility: MEDICAL CENTER | Age: 55
DRG: 291 | End: 2025-04-17
Attending: EMERGENCY MEDICINE
Payer: COMMERCIAL

## 2025-04-17 DIAGNOSIS — I50.9 ACUTE ON CHRONIC CONGESTIVE HEART FAILURE, UNSPECIFIED HEART FAILURE TYPE (HCC): ICD-10-CM

## 2025-04-17 DIAGNOSIS — I10 HYPERTENSION, UNSPECIFIED TYPE: ICD-10-CM

## 2025-04-17 DIAGNOSIS — I21.3 ST ELEVATION MYOCARDIAL INFARCTION (STEMI), UNSPECIFIED ARTERY (HCC): ICD-10-CM

## 2025-04-17 DIAGNOSIS — R09.02 HYPOXIA: ICD-10-CM

## 2025-04-17 PROBLEM — I50.31 ACUTE CONGESTIVE HEART FAILURE WITH LEFT VENTRICULAR DIASTOLIC DYSFUNCTION (HCC): Status: ACTIVE | Noted: 2025-04-17

## 2025-04-17 PROBLEM — I50.33 ACUTE ON CHRONIC CONGESTIVE HEART FAILURE WITH LEFT VENTRICULAR DIASTOLIC DYSFUNCTION (HCC): Status: ACTIVE | Noted: 2025-04-17

## 2025-04-17 PROBLEM — R74.01 TRANSAMINITIS: Status: ACTIVE | Noted: 2025-04-17

## 2025-04-17 PROBLEM — M10.071 ACUTE IDIOPATHIC GOUT INVOLVING TOE OF RIGHT FOOT: Status: ACTIVE | Noted: 2025-04-17

## 2025-04-17 LAB
ALBUMIN SERPL BCP-MCNC: 3.9 G/DL (ref 3.2–4.9)
ALBUMIN/GLOB SERPL: 1.2 G/DL
ALP SERPL-CCNC: 106 U/L (ref 30–99)
ALT SERPL-CCNC: 134 U/L (ref 2–50)
ANION GAP SERPL CALC-SCNC: 14 MMOL/L (ref 7–16)
AST SERPL-CCNC: 107 U/L (ref 12–45)
BASOPHILS # BLD AUTO: 0.4 % (ref 0–1.8)
BASOPHILS # BLD: 0.06 K/UL (ref 0–0.12)
BILIRUB SERPL-MCNC: 0.5 MG/DL (ref 0.1–1.5)
BUN SERPL-MCNC: 41 MG/DL (ref 8–22)
CALCIUM ALBUM COR SERPL-MCNC: 9.1 MG/DL (ref 8.5–10.5)
CALCIUM SERPL-MCNC: 9 MG/DL (ref 8.5–10.5)
CHLORIDE SERPL-SCNC: 104 MMOL/L (ref 96–112)
CO2 SERPL-SCNC: 21 MMOL/L (ref 20–33)
CREAT SERPL-MCNC: 1.96 MG/DL (ref 0.5–1.4)
EKG IMPRESSION: NORMAL
EOSINOPHIL # BLD AUTO: 0.03 K/UL (ref 0–0.51)
EOSINOPHIL NFR BLD: 0.2 % (ref 0–6.9)
ERYTHROCYTE [DISTWIDTH] IN BLOOD BY AUTOMATED COUNT: 52.4 FL (ref 35.9–50)
GFR SERPLBLD CREATININE-BSD FMLA CKD-EPI: 40 ML/MIN/1.73 M 2
GLOBULIN SER CALC-MCNC: 3.2 G/DL (ref 1.9–3.5)
GLUCOSE SERPL-MCNC: 127 MG/DL (ref 65–99)
HCT VFR BLD AUTO: 47.1 % (ref 42–52)
HGB BLD-MCNC: 15.4 G/DL (ref 14–18)
IMM GRANULOCYTES # BLD AUTO: 0.18 K/UL (ref 0–0.11)
IMM GRANULOCYTES NFR BLD AUTO: 1.2 % (ref 0–0.9)
LYMPHOCYTES # BLD AUTO: 2.43 K/UL (ref 1–4.8)
LYMPHOCYTES NFR BLD: 16.1 % (ref 22–41)
MCH RBC QN AUTO: 32.1 PG (ref 27–33)
MCHC RBC AUTO-ENTMCNC: 32.7 G/DL (ref 32.3–36.5)
MCV RBC AUTO: 98.1 FL (ref 81.4–97.8)
MONOCYTES # BLD AUTO: 0.95 K/UL (ref 0–0.85)
MONOCYTES NFR BLD AUTO: 6.3 % (ref 0–13.4)
NEUTROPHILS # BLD AUTO: 11.46 K/UL (ref 1.82–7.42)
NEUTROPHILS NFR BLD: 75.8 % (ref 44–72)
NRBC # BLD AUTO: 0.05 K/UL
NRBC BLD-RTO: 0.3 /100 WBC (ref 0–0.2)
NT-PROBNP SERPL IA-MCNC: ABNORMAL PG/ML (ref 0–125)
PLATELET # BLD AUTO: 268 K/UL (ref 164–446)
PMV BLD AUTO: 9.8 FL (ref 9–12.9)
POTASSIUM SERPL-SCNC: 4.1 MMOL/L (ref 3.6–5.5)
PROT SERPL-MCNC: 7.1 G/DL (ref 6–8.2)
RBC # BLD AUTO: 4.8 M/UL (ref 4.7–6.1)
SODIUM SERPL-SCNC: 139 MMOL/L (ref 135–145)
TROPONIN T SERPL-MCNC: 23 NG/L (ref 6–19)
WBC # BLD AUTO: 15.1 K/UL (ref 4.8–10.8)

## 2025-04-17 PROCEDURE — 80053 COMPREHEN METABOLIC PANEL: CPT

## 2025-04-17 PROCEDURE — 770006 HCHG ROOM/CARE - MED/SURG/GYN SEMI*

## 2025-04-17 PROCEDURE — 36415 COLL VENOUS BLD VENIPUNCTURE: CPT

## 2025-04-17 PROCEDURE — 84484 ASSAY OF TROPONIN QUANT: CPT

## 2025-04-17 PROCEDURE — 71045 X-RAY EXAM CHEST 1 VIEW: CPT

## 2025-04-17 PROCEDURE — 700111 HCHG RX REV CODE 636 W/ 250 OVERRIDE (IP): Mod: JZ

## 2025-04-17 PROCEDURE — 99285 EMERGENCY DEPT VISIT HI MDM: CPT

## 2025-04-17 PROCEDURE — 93005 ELECTROCARDIOGRAM TRACING: CPT | Mod: TC

## 2025-04-17 PROCEDURE — 93005 ELECTROCARDIOGRAM TRACING: CPT | Mod: TC | Performed by: EMERGENCY MEDICINE

## 2025-04-17 PROCEDURE — 96374 THER/PROPH/DIAG INJ IV PUSH: CPT

## 2025-04-17 PROCEDURE — 700102 HCHG RX REV CODE 250 W/ 637 OVERRIDE(OP)

## 2025-04-17 PROCEDURE — A9270 NON-COVERED ITEM OR SERVICE: HCPCS

## 2025-04-17 PROCEDURE — 85025 COMPLETE CBC W/AUTO DIFF WBC: CPT

## 2025-04-17 PROCEDURE — 99223 1ST HOSP IP/OBS HIGH 75: CPT

## 2025-04-17 PROCEDURE — 83880 ASSAY OF NATRIURETIC PEPTIDE: CPT

## 2025-04-17 PROCEDURE — 700111 HCHG RX REV CODE 636 W/ 250 OVERRIDE (IP): Mod: JZ | Performed by: EMERGENCY MEDICINE

## 2025-04-17 RX ORDER — AMOXICILLIN 250 MG
2 CAPSULE ORAL EVERY EVENING
Status: DISCONTINUED | OUTPATIENT
Start: 2025-04-17 | End: 2025-04-20 | Stop reason: HOSPADM

## 2025-04-17 RX ORDER — FUROSEMIDE 10 MG/ML
40 INJECTION INTRAMUSCULAR; INTRAVENOUS 2 TIMES DAILY
Status: DISCONTINUED | OUTPATIENT
Start: 2025-04-17 | End: 2025-04-19

## 2025-04-17 RX ORDER — LABETALOL HYDROCHLORIDE 5 MG/ML
10 INJECTION, SOLUTION INTRAVENOUS EVERY 4 HOURS PRN
Status: DISCONTINUED | OUTPATIENT
Start: 2025-04-17 | End: 2025-04-20 | Stop reason: HOSPADM

## 2025-04-17 RX ORDER — METHYLPREDNISOLONE 4 MG/1
12 TABLET ORAL DAILY
Status: COMPLETED | OUTPATIENT
Start: 2025-04-19 | End: 2025-04-19

## 2025-04-17 RX ORDER — ENOXAPARIN SODIUM 100 MG/ML
40 INJECTION SUBCUTANEOUS DAILY
Status: DISCONTINUED | OUTPATIENT
Start: 2025-04-17 | End: 2025-04-20 | Stop reason: HOSPADM

## 2025-04-17 RX ORDER — FUROSEMIDE 10 MG/ML
40 INJECTION INTRAMUSCULAR; INTRAVENOUS 2 TIMES DAILY
Status: DISCONTINUED | OUTPATIENT
Start: 2025-04-17 | End: 2025-04-17

## 2025-04-17 RX ORDER — ACETAMINOPHEN 325 MG/1
650 TABLET ORAL EVERY 6 HOURS PRN
Status: DISCONTINUED | OUTPATIENT
Start: 2025-04-17 | End: 2025-04-20

## 2025-04-17 RX ORDER — METHYLPREDNISOLONE 4 MG/1
4 TABLET ORAL DAILY
Status: DISCONTINUED | OUTPATIENT
Start: 2025-04-17 | End: 2025-04-17

## 2025-04-17 RX ORDER — ALBUTEROL SULFATE 90 UG/1
2 INHALANT RESPIRATORY (INHALATION) EVERY 4 HOURS PRN
Status: DISCONTINUED | OUTPATIENT
Start: 2025-04-17 | End: 2025-04-20 | Stop reason: HOSPADM

## 2025-04-17 RX ORDER — METHYLPREDNISOLONE 4 MG/1
8 TABLET ORAL DAILY
Status: COMPLETED | OUTPATIENT
Start: 2025-04-20 | End: 2025-04-20

## 2025-04-17 RX ORDER — FUROSEMIDE 10 MG/ML
40 INJECTION INTRAMUSCULAR; INTRAVENOUS ONCE
Status: COMPLETED | OUTPATIENT
Start: 2025-04-17 | End: 2025-04-17

## 2025-04-17 RX ORDER — ATORVASTATIN CALCIUM 40 MG/1
80 TABLET, FILM COATED ORAL EVERY EVENING
Status: DISCONTINUED | OUTPATIENT
Start: 2025-04-17 | End: 2025-04-20 | Stop reason: HOSPADM

## 2025-04-17 RX ORDER — EZETIMIBE 10 MG/1
10 TABLET ORAL DAILY
Status: DISCONTINUED | OUTPATIENT
Start: 2025-04-17 | End: 2025-04-17

## 2025-04-17 RX ORDER — DAPAGLIFLOZIN 10 MG/1
10 TABLET, FILM COATED ORAL DAILY
Status: DISCONTINUED | OUTPATIENT
Start: 2025-04-17 | End: 2025-04-20 | Stop reason: HOSPADM

## 2025-04-17 RX ORDER — CARVEDILOL 25 MG/1
25 TABLET ORAL 2 TIMES DAILY WITH MEALS
Status: DISCONTINUED | OUTPATIENT
Start: 2025-04-17 | End: 2025-04-20 | Stop reason: HOSPADM

## 2025-04-17 RX ORDER — METHYLPREDNISOLONE 4 MG/1
4 TABLET ORAL DAILY
Status: DISCONTINUED | OUTPATIENT
Start: 2025-04-21 | End: 2025-04-20 | Stop reason: HOSPADM

## 2025-04-17 RX ORDER — ALBUTEROL SULFATE 90 UG/1
2 INHALANT RESPIRATORY (INHALATION)
Status: DISCONTINUED | OUTPATIENT
Start: 2025-04-17 | End: 2025-04-17

## 2025-04-17 RX ORDER — OXYCODONE AND ACETAMINOPHEN 5; 325 MG/1; MG/1
1 TABLET ORAL EVERY 4 HOURS PRN
Refills: 0 | Status: DISCONTINUED | OUTPATIENT
Start: 2025-04-17 | End: 2025-04-20 | Stop reason: HOSPADM

## 2025-04-17 RX ORDER — METHYLPREDNISOLONE 16 MG/1
16 TABLET ORAL DAILY
Status: COMPLETED | OUTPATIENT
Start: 2025-04-18 | End: 2025-04-18

## 2025-04-17 RX ORDER — POLYETHYLENE GLYCOL 3350 17 G/17G
1 POWDER, FOR SOLUTION ORAL
Status: DISCONTINUED | OUTPATIENT
Start: 2025-04-17 | End: 2025-04-20 | Stop reason: HOSPADM

## 2025-04-17 RX ADMIN — DAPAGLIFLOZIN 10 MG: 10 TABLET, FILM COATED ORAL at 08:51

## 2025-04-17 RX ADMIN — SACUBITRIL AND VALSARTAN 1 TABLET: 24; 26 TABLET, FILM COATED ORAL at 17:28

## 2025-04-17 RX ADMIN — CARVEDILOL 25 MG: 25 TABLET, FILM COATED ORAL at 17:28

## 2025-04-17 RX ADMIN — FUROSEMIDE 40 MG: 10 INJECTION, SOLUTION INTRAVENOUS at 10:21

## 2025-04-17 RX ADMIN — SACUBITRIL AND VALSARTAN 1 TABLET: 24; 26 TABLET, FILM COATED ORAL at 08:56

## 2025-04-17 RX ADMIN — CARVEDILOL 25 MG: 25 TABLET, FILM COATED ORAL at 08:51

## 2025-04-17 RX ADMIN — ATORVASTATIN CALCIUM 80 MG: 40 TABLET, FILM COATED ORAL at 17:28

## 2025-04-17 RX ADMIN — FUROSEMIDE 40 MG: 10 INJECTION, SOLUTION INTRAVENOUS at 17:28

## 2025-04-17 RX ADMIN — METHYLPREDNISOLONE 20 MG: 4 TABLET ORAL at 10:21

## 2025-04-17 RX ADMIN — FUROSEMIDE 40 MG: 10 INJECTION, SOLUTION INTRAVENOUS at 02:43

## 2025-04-17 SDOH — ECONOMIC STABILITY: TRANSPORTATION INSECURITY
IN THE PAST 12 MONTHS, HAS THE LACK OF TRANSPORTATION KEPT YOU FROM MEDICAL APPOINTMENTS OR FROM GETTING MEDICATIONS?: NO

## 2025-04-17 SDOH — ECONOMIC STABILITY: TRANSPORTATION INSECURITY
IN THE PAST 12 MONTHS, HAS LACK OF RELIABLE TRANSPORTATION KEPT YOU FROM MEDICAL APPOINTMENTS, MEETINGS, WORK OR FROM GETTING THINGS NEEDED FOR DAILY LIVING?: NO

## 2025-04-17 ASSESSMENT — LIFESTYLE VARIABLES
EVER HAD A DRINK FIRST THING IN THE MORNING TO STEADY YOUR NERVES TO GET RID OF A HANGOVER: NO
EVER FELT BAD OR GUILTY ABOUT YOUR DRINKING: NO
ON A TYPICAL DAY WHEN YOU DRINK ALCOHOL HOW MANY DRINKS DO YOU HAVE: 0
TOTAL SCORE: 0
AVERAGE NUMBER OF DAYS PER WEEK YOU HAVE A DRINK CONTAINING ALCOHOL: 0
CONSUMPTION TOTAL: NEGATIVE
HOW MANY TIMES IN THE PAST YEAR HAVE YOU HAD 5 OR MORE DRINKS IN A DAY: 0
DOES PATIENT WANT TO STOP DRINKING: NO
HAVE YOU EVER FELT YOU SHOULD CUT DOWN ON YOUR DRINKING: NO
HAVE PEOPLE ANNOYED YOU BY CRITICIZING YOUR DRINKING: NO
ALCOHOL_USE: NO
TOTAL SCORE: 0
TOTAL SCORE: 0

## 2025-04-17 ASSESSMENT — ENCOUNTER SYMPTOMS
MYALGIAS: 0
NAUSEA: 0
ABDOMINAL PAIN: 0
HEADACHES: 0
DIARRHEA: 0
FEVER: 0
DOUBLE VISION: 0
CONSTIPATION: 0
SHORTNESS OF BREATH: 1
VOMITING: 0
SORE THROAT: 0
COUGH: 0
PALPITATIONS: 0
ORTHOPNEA: 1
CHILLS: 0
DIZZINESS: 0
HEARTBURN: 0
WHEEZING: 0

## 2025-04-17 ASSESSMENT — COGNITIVE AND FUNCTIONAL STATUS - GENERAL
DAILY ACTIVITIY SCORE: 24
MOBILITY SCORE: 24
SUGGESTED CMS G CODE MODIFIER DAILY ACTIVITY: CH
SUGGESTED CMS G CODE MODIFIER MOBILITY: CH

## 2025-04-17 ASSESSMENT — FIBROSIS 4 INDEX: FIB4 SCORE: 0.92

## 2025-04-17 ASSESSMENT — PATIENT HEALTH QUESTIONNAIRE - PHQ9
1. LITTLE INTEREST OR PLEASURE IN DOING THINGS: NOT AT ALL
2. FEELING DOWN, DEPRESSED, IRRITABLE, OR HOPELESS: NOT AT ALL
SUM OF ALL RESPONSES TO PHQ9 QUESTIONS 1 AND 2: 0

## 2025-04-17 ASSESSMENT — SOCIAL DETERMINANTS OF HEALTH (SDOH)
IN THE PAST 12 MONTHS, HAS THE ELECTRIC, GAS, OIL, OR WATER COMPANY THREATENED TO SHUT OFF SERVICE IN YOUR HOME?: NO
WITHIN THE PAST 12 MONTHS, YOU WORRIED THAT YOUR FOOD WOULD RUN OUT BEFORE YOU GOT THE MONEY TO BUY MORE: NEVER TRUE
WITHIN THE PAST 12 MONTHS, THE FOOD YOU BOUGHT JUST DIDN'T LAST AND YOU DIDN'T HAVE MONEY TO GET MORE: NEVER TRUE
WITHIN THE PAST 12 MONTHS, YOU WORRIED THAT YOUR FOOD WOULD RUN OUT BEFORE YOU GOT THE MONEY TO BUY MORE: NEVER TRUE
WITHIN THE PAST 12 MONTHS, THE FOOD YOU BOUGHT JUST DIDN'T LAST AND YOU DIDN'T HAVE MONEY TO GET MORE: NEVER TRUE
WITHIN THE LAST YEAR, HAVE YOU BEEN KICKED, HIT, SLAPPED, OR OTHERWISE PHYSICALLY HURT BY YOUR PARTNER OR EX-PARTNER?: NO
WITHIN THE LAST YEAR, HAVE TO BEEN RAPED OR FORCED TO HAVE ANY KIND OF SEXUAL ACTIVITY BY YOUR PARTNER OR EX-PARTNER?: NO
WITHIN THE LAST YEAR, HAVE YOU BEEN AFRAID OF YOUR PARTNER OR EX-PARTNER?: NO
IN THE PAST 12 MONTHS, HAS THE ELECTRIC, GAS, OIL, OR WATER COMPANY THREATENED TO SHUT OFF SERVICE IN YOUR HOME?: NO
WITHIN THE LAST YEAR, HAVE YOU BEEN HUMILIATED OR EMOTIONALLY ABUSED IN OTHER WAYS BY YOUR PARTNER OR EX-PARTNER?: NO

## 2025-04-17 ASSESSMENT — PAIN DESCRIPTION - PAIN TYPE
TYPE: ACUTE PAIN
TYPE: ACUTE PAIN

## 2025-04-17 NOTE — ED TRIAGE NOTES
Pt ambulated into triage with c/o SOB and bloating. Pt sts he was here a couple days ago for gout and given medrol dose pack. Pt sts he was told not to take his lasix and is now feeling bloated, SOB, and weak. Protocol orders placed and charge notified. Pt is A&O and ambulatory.

## 2025-04-17 NOTE — PROGRESS NOTES
Patient arrived to unit at this time. Patient ambulated from the gurney to the bed, walks steadily and tolerates well. Patient is A&Ox4 and reports 0/10 pain at this time. Patient on RA, awake, and alert.     Patient oriented to room and unit. Patient educated on the use of the call light and verbalized understanding. Bed in the lowest and locked position with personal belongings and call light within reach. Standard precautions and hourly rounding in place. Safety education provided. POC discussed and patient declines any further needs at this time. Orders carried out.

## 2025-04-17 NOTE — H&P
Hospital Medicine History & Physical Note    Date of Service  4/17/2025    Primary Care Physician  EMI Jorgensen    Code Status  Full Code    Chief Complaint  Chief Complaint   Patient presents with    Weakness    Shortness of Breath       History of Presenting Illness  Patient is a 54-year-old male with past medical history of chronic heart failure with recovered ejection fraction, hyperlipidemia, hypertension, and narcolepsy who presents due to dyspnea and orthopnea after stopping his heart failure medications.  Several days ago, patient states he came into the emergency department with right foot pain found to have gout and was advised to stop his heart failure medications as potential etiology of gout flare and was prescribed Medrol Dosepak.  However since then, patient is bored orthopnea, dyspnea on exertion, and worsening heart failure symptoms prompting him to get reevaluated.  In the emergency department, patient was found to have tachycardia, tachypnea, transaminitis with AST of 107,  and chest x-ray demonstrating interstitial opacities likely with pulmonary edema.  Denies any chest pain.  Patient likely has acute on chronic congestive heart failure due to medication noncompliance and will be admitted for diuresis.    I discussed the plan of care with patient.    Review of Systems  Review of Systems   Constitutional:  Negative for chills and fever.   HENT:  Negative for congestion and sore throat.    Eyes:  Negative for double vision.   Respiratory:  Positive for shortness of breath. Negative for cough and wheezing.    Cardiovascular:  Positive for orthopnea and leg swelling. Negative for chest pain and palpitations.   Gastrointestinal:  Negative for abdominal pain, constipation, diarrhea, nausea and vomiting.   Genitourinary:  Negative for dysuria.   Musculoskeletal:  Negative for myalgias.   Skin:  Negative for rash.   Neurological:  Negative for dizziness and headaches.       Past  Medical History   has a past medical history of Abnormal electrocardiogram (7/27/2011), Bronchitis, Chronic systolic congestive heart failure (HCC) (7/27/2011), Congestive heart failure (HCC), Dizziness (3/31/2011), Fall, Heart valve disease, Hyperlipidemia, Hypertension, Male erectile disorder (8/9/2011), Mixed hyperlipidemia (8/9/2011), Narcolepsy with cataplexy, Pulmonary embolism (HCC), and STEMI (ST elevation myocardial infarction) pk  trop 150 POBA LAD and DIAG med rx. (12/18/2010).    Surgical History   has a past surgical history that includes other (tonsilectomy); other cardiac surgery; zzz cardiac cath (12/2010); and tonsillectomy and adenoidectomy.     Family History  family history is not on file.   Family history reviewed with patient. There is no family history that is pertinent to the chief complaint.     Social History   reports that he quit smoking about 14 years ago. His smoking use included cigarettes. He started smoking about 29 years ago. He has a 7.5 pack-year smoking history. He has never used smokeless tobacco. He reports that he does not currently use alcohol. He reports that he does not currently use drugs after having used the following drugs: Marijuana and Inhaled.    Allergies  No Known Allergies    Medications  Prior to Admission Medications   Prescriptions Last Dose Informant Patient Reported? Taking?   JARDIANCE 10 MG Tab tablet 3/27/2025 Patient Yes No   Sig: Take 10 mg by mouth every day.   Patient taking differently: Take 10 mg by mouth every day. ON HOLD   VITAMIN D PO 4/13/2025 Patient Yes Yes   Sig: Take 1 Tablet by mouth 2 times a day.   albuterol 108 (90 Base) MCG/ACT Aero Soln inhalation aerosol 4/3/2025 Patient No No   Sig: Inhale 2 Puffs every 6 hours as needed for Shortness of Breath.   amLODIPine (NORVASC) 5 MG Tab 4/13/2025 Patient Yes No   Sig: Take 5 mg by mouth 2 times a day.   amphetamine-dextroamphetamine ER (ADDERALL XR, 30MG,) 30 MG XR capsule 3/27/2025 Patient  Yes No   Sig: Take 30 mg by mouth 2 times a day as needed (Narcolepsy).   atorvastatin (LIPITOR) 80 MG tablet 4/13/2025 Patient Yes No   Sig: Take 80 mg by mouth every day.   carvedilol (COREG) 25 MG Tab 4/13/2025 Patient Yes No   Sig: Take 25 mg by mouth 2 times a day, with meals.   ezetimibe (ZETIA) 10 MG Tab 4/10/2025 Patient Yes No   Sig: Take 10 mg by mouth every day.   Patient taking differently: Take 10 mg by mouth every day. ON HOLD   furosemide (LASIX) 20 MG Tab 4/13/2025 Patient Yes No   Sig: Take 20 mg by mouth 1 time a day as needed.   methylPREDNISolone (MEDROL DOSEPAK) 4 MG Tablet Therapy Pack 4/16/2025 at 10:00 PM Patient No Yes   Sig: Use as directed   oxyCODONE-acetaminophen (PERCOCET) 5-325 MG Tab New Rx Patient No No   Sig: Take 1 Tablet by mouth every four hours as needed for Severe Pain for up to 7 days.   sacubitril-valsartan (ENTRESTO) 24-26 MG Tab 4/13/2025 Patient Yes No   Sig: Take 1 Tablet by mouth 2 times a day.      Facility-Administered Medications: None       Physical Exam  Temp:  [36.8 °C (98.2 °F)] 36.8 °C (98.2 °F)  Pulse:  [102-113] 105  Resp:  [13-24] 19  BP: (137-174)/() 141/90  SpO2:  [88 %-97 %] 96 %  Blood Pressure: (!) 141/90   Temperature: 36.8 °C (98.2 °F)   Pulse: (!) 105   Respiration: 19   Pulse Oximetry: 96 %       Physical Exam  Vitals and nursing note reviewed.   Constitutional:       General: He is not in acute distress.  HENT:      Head: Normocephalic and atraumatic.      Mouth/Throat:      Mouth: Mucous membranes are moist.   Eyes:      General: No scleral icterus.     Extraocular Movements: Extraocular movements intact.   Cardiovascular:      Rate and Rhythm: Normal rate and regular rhythm.      Heart sounds: No murmur heard.     No gallop.   Pulmonary:      Effort: Pulmonary effort is normal. No respiratory distress.      Breath sounds: No wheezing, rhonchi or rales.   Abdominal:      General: Abdomen is flat. Bowel sounds are normal. There is no  distension.      Palpations: Abdomen is soft.      Tenderness: There is no abdominal tenderness.   Musculoskeletal:         General: No swelling or tenderness.      Cervical back: Neck supple.   Skin:     General: Skin is warm.   Neurological:      General: No focal deficit present.      Mental Status: He is alert.   Psychiatric:         Mood and Affect: Mood normal.         Laboratory:  Recent Labs     04/17/25 0228   WBC 15.1*   RBC 4.80   HEMOGLOBIN 15.4   HEMATOCRIT 47.1   MCV 98.1*   MCH 32.1   MCHC 32.7   RDW 52.4*   PLATELETCT 268   MPV 9.8     Recent Labs     04/17/25 0228   SODIUM 139   POTASSIUM 4.1   CHLORIDE 104   CO2 21   GLUCOSE 127*   BUN 41*   CREATININE 1.96*   CALCIUM 9.0     Recent Labs     04/17/25 0228   ALTSGPT 134*   ASTSGOT 107*   ALKPHOSPHAT 106*   TBILIRUBIN 0.5   GLUCOSE 127*         Recent Labs     04/17/25 0228   NTPROBNP 71856*         Recent Labs     04/17/25 0228   TROPONINT 23*       Imaging:  DX-CHEST-PORTABLE (1 VIEW)   Final Result         1.  No acute cardiopulmonary disease.   2.  Cardiomegaly      EC-ECHOCARDIOGRAM COMPLETE W/O CONT    (Results Pending)       X-Ray:  I have personally reviewed the images and compared with prior images.  EKG:  I have personally reviewed the images and compared with prior images.    Assessment/Plan:  Justification for Admission Status  I anticipate this patient will require at least two midnights for appropriate medical management, necessitating inpatient admission because acute on chronic congestive heart failure    Patient will need a Med/Surg bed on MEDICAL service .  The need is secondary to acute on chronic congestive heart failure.    * Acute on chronic congestive heart failure with left ventricular diastolic dysfunction (HCC)- (present on admission)  Assessment & Plan  Patient recently had his Lasix discontinued due to potential thought of gout flare etiology from prior ED 4/13/2025 and provided Medrol Dosepak now presenting with  orthopnea and dyspnea on exertion with elevated transaminases likely consistent with acute congestive heart failure  - Patient reports last echocardiogram about 1 month ago as an outpatient with recovered ejection fraction and also had a recent nuclear stress test, we will hold on repeat echocardiogram at this time given recent evaluation  - Lasix 40 mg IV twice daily  - Strict I's and O's, 2 g sodium restriction, 1.5 L fluid restriction  - Likely medication noncompliance induced, patient was advised to stop his heart failure medications due to his gout  - Will restart patient on his home medications of entresto, coreg, jardiance    Acute idiopathic gout involving toe of right foot  Assessment & Plan  Patient with previous gout flare put on Medrol Dosepak, can continue however we will also diuresis patient is in acute heart failure    Transaminitis  Assessment & Plan  Likely secondary to volume overload with congestive hepatopathy, diuresis    CAD (coronary artery disease)- (present on admission)  Assessment & Plan  History of coronary artery disease complicated by STEMI in 2010 status post balloon angioplasty  - Unclear if patient is on aspirin, will restart    HTN (hypertension)- (present on admission)  Assessment & Plan  Continue home medications    Narcolepsy with cataplexy- (present on admission)  Assessment & Plan  Adderall was as needed, will hold for now        VTE prophylaxis: enoxaparin ppx

## 2025-04-17 NOTE — ED NOTES
Patient reports he still is SOB especially when slightly laying down, pt placed on 3L of oxygen NC for comfort.

## 2025-04-17 NOTE — ED NOTES
Patient resting on stretcher in no acute distress. Equal chest rise noted. VS stable on monitor. Bed locked and in lowest position. Call bell within reach.

## 2025-04-17 NOTE — PROGRESS NOTES
Hospital Medicine Daily Progress Note    Date of Service  4/17/2025    Chief Complaint  Johny Toledo is a 54 y.o. male admitted 4/17/2025 with shortness of breath after stopping heart failure medications.     Hospital Course  Patient is a 54-year-old male with past medical history of chronic heart failure with recovered ejection fraction, hyperlipidemia, hypertension, and narcolepsy who presents due to dyspnea and orthopnea after stopping his heart failure medications. Several days ago, patient states he came into the emergency department with right foot pain found to have gout and was advised to stop his heart failure medications as potential etiology of gout flare and was prescribed Medrol Dosepak. However since then, patient is bored orthopnea, dyspnea on exertion, and worsening heart failure symptoms prompting him to get reevaluated. In the emergency department, patient was found to have tachycardia, tachypnea, transaminitis with AST of 107,  and chest x-ray demonstrating interstitial opacities likely with pulmonary edema. Denies any chest pain. Patient likely has acute on chronic congestive heart failure due to medication noncompliance and will be admitted for diuresis.     Interval Problem Update  4/17 Afebrile, vitals are stable and on 2L nasal canula.  Continue IV diuresis.  Wean oxygen as appropriate. Monitor LFT's- check labs in AM.    I have discussed this patient's plan of care and discharge plan at IDT rounds today with Case Management, Nursing, Nursing leadership, and other members of the IDT team.    Consultants/Specialty  none    Code Status  Full Code    Disposition  The patient is not medically cleared for discharge to home or a post-acute facility.  Anticipate discharge to: home with close outpatient follow-up    I have placed the appropriate orders for post-discharge needs.    Review of Systems  Review of Systems   Constitutional:  Negative for chills, fever and malaise/fatigue.    Respiratory:  Positive for shortness of breath. Negative for cough.    Cardiovascular:  Positive for orthopnea. Negative for chest pain, palpitations and leg swelling.   Gastrointestinal:  Negative for abdominal pain, diarrhea, heartburn, nausea and vomiting.   Genitourinary:  Negative for dysuria, frequency and urgency.   Neurological:  Negative for dizziness and headaches.   All other systems reviewed and are negative.       Physical Exam  Temp:  [36.1 °C (96.9 °F)-36.8 °C (98.2 °F)] 36.1 °C (96.9 °F)  Pulse:  [] 103  Resp:  [13-24] 18  BP: (136-174)/() 136/97  SpO2:  [88 %-99 %] 94 %    Physical Exam  Vitals and nursing note reviewed.   Constitutional:       Appearance: Normal appearance. He is not ill-appearing.   HENT:      Head: Normocephalic and atraumatic.      Jaw: There is normal jaw occlusion.      Right Ear: Hearing normal.      Left Ear: Hearing normal.      Nose: Nose normal.      Mouth/Throat:      Lips: Pink.      Mouth: Mucous membranes are moist.   Eyes:      Extraocular Movements: Extraocular movements intact.      Conjunctiva/sclera: Conjunctivae normal.      Pupils: Pupils are equal, round, and reactive to light.   Neck:      Vascular: No carotid bruit.   Cardiovascular:      Rate and Rhythm: Normal rate and regular rhythm.      Pulses: Normal pulses.      Heart sounds: Normal heart sounds, S1 normal and S2 normal.   Pulmonary:      Effort: Pulmonary effort is normal.      Breath sounds: Normal breath sounds and air entry. No stridor.   Musculoskeletal:      Cervical back: Normal range of motion and neck supple.      Right lower leg: No edema.      Left lower leg: No edema.   Skin:     General: Skin is warm and dry.      Capillary Refill: Capillary refill takes less than 2 seconds.   Neurological:      General: No focal deficit present.      Mental Status: He is alert and oriented to person, place, and time. Mental status is at baseline.      Sensory: Sensation is intact.       Motor: Motor function is intact.   Psychiatric:         Attention and Perception: Attention and perception normal.         Mood and Affect: Mood and affect normal.         Speech: Speech normal.         Behavior: Behavior normal. Behavior is cooperative.         Fluids  No intake or output data in the 24 hours ending 04/17/25 0930     Laboratory  Recent Labs     04/17/25  0228   WBC 15.1*   RBC 4.80   HEMOGLOBIN 15.4   HEMATOCRIT 47.1   MCV 98.1*   MCH 32.1   MCHC 32.7   RDW 52.4*   PLATELETCT 268   MPV 9.8     Recent Labs     04/17/25  0228   SODIUM 139   POTASSIUM 4.1   CHLORIDE 104   CO2 21   GLUCOSE 127*   BUN 41*   CREATININE 1.96*   CALCIUM 9.0                   Imaging  DX-CHEST-PORTABLE (1 VIEW)   Final Result         1.  No acute cardiopulmonary disease.   2.  Cardiomegaly           Assessment/Plan  * Acute on chronic congestive heart failure with left ventricular diastolic dysfunction (HCC)- (present on admission)  Assessment & Plan  Patient recently had his Lasix discontinued due to potential thought of gout flare etiology from prior ED 4/13/2025 and provided Medrol Dosepak now presenting with orthopnea and dyspnea on exertion with elevated transaminases likely consistent with acute congestive heart failure  - Patient reports last echocardiogram about 1 month ago as an outpatient with recovered ejection fraction and also had a recent nuclear stress test, we will hold on repeat echocardiogram at this time given recent evaluation  - Lasix 40 mg IV twice daily  - Strict I's and O's, 2 g sodium restriction, 1.5 L fluid restriction  - Likely medication noncompliance induced, patient was advised to stop his heart failure medications due to his gout  - Will restart patient on his home medications of entresto, coreg, jardiance    Transaminitis  Assessment & Plan  Likely secondary to volume overload with congestive hepatopathy, diuresis    Acute idiopathic gout involving toe of right foot  Assessment &  Plan  Patient with previous gout flare put on Medrol Dosepak, can continue however we will also diuresis patient is in acute heart failure    CAD (coronary artery disease)- (present on admission)  Assessment & Plan  History of coronary artery disease complicated by STEMI in 2010 status post balloon angioplasty  - Unclear if patient is on aspirin, will restart    HTN (hypertension)- (present on admission)  Assessment & Plan  Continue home medications    Narcolepsy with cataplexy- (present on admission)  Assessment & Plan  Adderall was as needed, will hold for now         VTE prophylaxis:   SCDs/TEDs   enoxaparin ppx      I have performed a physical exam and reviewed and updated ROS and Plan today (4/17/2025). In review of yesterday's note (4/16/2025), there are no changes except as documented above.

## 2025-04-17 NOTE — ED NOTES
Patient WC to RED 09 for triage complaint. Pt placed in gown and placed on monitor. Pt's Chart up for ERP

## 2025-04-17 NOTE — ED NOTES
Pt ambulated to restroom with steady gate. Pt back in bed. Gurney in lowest position and locked. NAD. VS stable, on monitor. Call bell in reach. All belongings with patient.

## 2025-04-17 NOTE — ASSESSMENT & PLAN NOTE
Patient with previous gout flare put on Medrol Dosepak, can continue however we will also diuresis patient is in acute heart failure  Resolving

## 2025-04-17 NOTE — PROGRESS NOTES
I will be off duty and signed out of voalte at 3pm.  If you have any needs for this patient after 3pm, please reach out to the on call Katya STEWARD.  Thank you!

## 2025-04-17 NOTE — PROGRESS NOTES
4 Eyes Skin Assessment Completed by KATIE Ortiz and KATIE Ness.    Head WDL  Ears WDL  Nose WDL  Mouth WDL  Neck WDL  Breast/Chest WDL  Shoulder Blades WDL  Spine WDL  (R) Arm/Elbow/Hand WDL  (L) Arm/Elbow/Hand WDL  Abdomen WDL  Groin WDL  Scrotum/Coccyx/Buttocks WDL  (R) Leg WDL  (L) Leg WDL  (R) Heel/Foot/Toe Redness and Blanching  (L) Heel/Foot/Toe Redness and Blanching          Devices In Places Pulse Ox and Nasal Cannula      Interventions In Place Gray Ear Foams    Possible Skin Injury No    Pictures Uploaded Into Epic N/A  Wound Consult Placed N/A  RN Wound Prevention Protocol Ordered No

## 2025-04-17 NOTE — CARE PLAN
The patient is Stable - Low risk of patient condition declining or worsening    Shift Goals  Clinical Goals: Pt O2 sat will remain above 90% this shift. Pt will report pain 4/10 or less this shift. Monitor I&O.  Patient Goals: Rest  Family Goals: JOSÉ LUIS    Progress made toward(s) clinical / shift goals:  Pt O2 sat has remained above 90% this shift and O2 has been weaned down to 0.5 lpm from 2 lpm, Baseline is room air. Pt has reported no pain this shift.     Problem: Respiratory  Goal: Patient will achieve/maintain optimum respiratory ventilation and gas exchange  Outcome: Progressing     Problem: Pain - Standard  Goal: Alleviation of pain or a reduction in pain to the patient’s comfort goal  Outcome: Progressing       Patient is not progressing towards the following goals:

## 2025-04-17 NOTE — ASSESSMENT & PLAN NOTE
Patient recently had his Lasix discontinued due to potential thought of gout flare etiology from prior ED 4/13/2025 and provided Medrol Dosepak now presenting with orthopnea and dyspnea on exertion with elevated transaminases likely consistent with acute congestive heart failure  - Patient reports last echocardiogram about 1 month ago as an outpatient with recovered ejection fraction and also had a recent nuclear stress test, we will hold on repeat echocardiogram at this time given recent evaluation  - Lasix 40 mg IV twice daily  - Strict I's and O's, 2 g sodium restriction, 1.5 L fluid restriction  - Patient was advised to stop lasix due to his gout  - Will restart patient on his home medications of entresto, coreg, jardiance    Patient noted to have a gout flare, he stopped all his heart failure medications resulting in shortness of breath and pulmonary edema.  Patient started on aggressive IV diuresis.  Counseled patient at length in regards to the importance of medication compliance.

## 2025-04-17 NOTE — ED PROVIDER NOTES
ER Provider Note    Scribed for Leon Mcgraw II, M.D. by Epi Massey. 4/17/2025  2:34 AM    Primary Care Provider: EMI Jorgensen    CHIEF COMPLAINT   Chief Complaint   Patient presents with    Weakness    Shortness of Breath     EXTERNAL RECORDS REVIEWED  Cardiology appointment 10/6/23. Dr. Maharaj. Hx of cardiomyopathy with EF as low as 10-15% recovered to 40%. AICD in place. Hx of CAD with stenting of LAD.    HPI/ROS  LIMITATION TO HISTORY   Select: : None  OUTSIDE HISTORIAN(S):  None    Johny Toledo is a 54 y.o. male with a history of CHF, STEMI, stents, and pacemaker who presents to the ED for evaluation of weakness and shortness of breath. The patient was seen here a few days ago for right toe pain which he was told was likely gout, told not to take his lasix until he finished a prescribed a course of steroids. He reports feeling fluid on his lungs and shortness of breath worse with laying flat and exertion. He also feels fatigued and has decreased appetite. He denies constipation and has been passing flatus. He was previously on coumadin, but is not currently anticoagulated. The patient is followed by Dr. Chey Maharaj (cardiology) and Dr. Martinez (Nephrology). He is also followed by GI for upper abdominal pain.     PAST MEDICAL HISTORY  Past Medical History:   Diagnosis Date    Abnormal electrocardiogram 7/27/2011    Bronchitis     Chronic systolic congestive heart failure (HCC) 7/27/2011    Congestive heart failure (HCC)     Dizziness 3/31/2011    Fall     Heart valve disease     Hyperlipidemia     Hypertension     Male erectile disorder 8/9/2011    Mixed hyperlipidemia 8/9/2011    Narcolepsy with cataplexy     Pulmonary embolism (HCC)     STEMI (ST elevation myocardial infarction) pk  trop 150 POBA LAD and DIAG med rx. 12/18/2010     SURGICAL HISTORY  Past Surgical History:   Procedure Laterality Date    ZZZ CARDIAC CATH  12/2010    OTHER  tonsilectomy    OTHER CARDIAC SURGERY    "   catherization 12/2010, 12/2008    TONSILLECTOMY AND ADENOIDECTOMY       FAMILY HISTORY  Family History   Problem Relation Age of Onset    Sleep Apnea Neg Hx      SOCIAL HISTORY   reports that he quit smoking about 14 years ago. His smoking use included cigarettes. He started smoking about 29 years ago. He has a 7.5 pack-year smoking history. He has never used smokeless tobacco. He reports that he does not currently use alcohol. He reports that he does not currently use drugs after having used the following drugs: Marijuana and Inhaled.    CURRENT MEDICATIONS  Previous Medications    ALBUTEROL 108 (90 BASE) MCG/ACT AERO SOLN INHALATION AEROSOL    Inhale 2 Puffs every 6 hours as needed for Shortness of Breath.    AMLODIPINE (NORVASC) 5 MG TAB    Take 5 mg by mouth 2 times a day.    AMPHETAMINE-DEXTROAMPHETAMINE ER (ADDERALL XR, 30MG,) 30 MG XR CAPSULE    Take 30 mg by mouth 2 times a day as needed (Narcolepsy).    ATORVASTATIN (LIPITOR) 80 MG TABLET    Take 80 mg by mouth every day.    CARVEDILOL (COREG) 25 MG TAB    Take 25 mg by mouth 2 times a day, with meals.    EZETIMIBE (ZETIA) 10 MG TAB    Take 10 mg by mouth every day.    FUROSEMIDE (LASIX) 20 MG TAB    Take 20 mg by mouth every day.    JARDIANCE 10 MG TAB TABLET    Take 10 mg by mouth every day.    METHYLPREDNISOLONE (MEDROL DOSEPAK) 4 MG TABLET THERAPY PACK    Use as directed    OXYCODONE-ACETAMINOPHEN (PERCOCET) 5-325 MG TAB    Take 1 Tablet by mouth every four hours as needed for Severe Pain for up to 7 days.    OXYMETAZOLINE (AFRIN 12 HOUR) 0.05 % SOLUTION    Administer 2-3 Sprays into affected nostril(S) 2 times a day as needed for Congestion.    SACUBITRIL-VALSARTAN (ENTRESTO PO)    Take  by mouth.       ALLERGIES  Patient has no known allergies.    PHYSICAL EXAM  BP (!) 155/109   Pulse (!) 113   Temp 36.8 °C (98.2 °F) (Temporal)   Resp 18   Ht 1.676 m (5' 6\")   Wt 71.4 kg (157 lb 6.5 oz)   SpO2 92%   BMI 25.41 kg/m²   Physical Exam  Vitals " and nursing note reviewed.   Constitutional:       Appearance: He is well-developed.   HENT:      Head: Normocephalic.   Cardiovascular:      Rate and Rhythm: Regular rhythm. Tachycardia present.   Pulmonary:      Comments: Increased effort, no audible rales  Abdominal:      Comments: Feels distended. No tenderness   Musculoskeletal:      Comments: No pitting edema of lower extremities. No calf tenderness   Neurological:      General: No focal deficit present.      Mental Status: He is alert and oriented to person, place, and time.   Psychiatric:         Mood and Affect: Mood normal.     DIAGNOSTIC STUDIES    EKG/LABS  Results for orders placed or performed during the hospital encounter of 04/17/25   CBC with Differential    Collection Time: 04/17/25  2:28 AM   Result Value Ref Range    WBC 15.1 (H) 4.8 - 10.8 K/uL    RBC 4.80 4.70 - 6.10 M/uL    Hemoglobin 15.4 14.0 - 18.0 g/dL    Hematocrit 47.1 42.0 - 52.0 %    MCV 98.1 (H) 81.4 - 97.8 fL    MCH 32.1 27.0 - 33.0 pg    MCHC 32.7 32.3 - 36.5 g/dL    RDW 52.4 (H) 35.9 - 50.0 fL    Platelet Count 268 164 - 446 K/uL    MPV 9.8 9.0 - 12.9 fL    Neutrophils-Polys 75.80 (H) 44.00 - 72.00 %    Lymphocytes 16.10 (L) 22.00 - 41.00 %    Monocytes 6.30 0.00 - 13.40 %    Eosinophils 0.20 0.00 - 6.90 %    Basophils 0.40 0.00 - 1.80 %    Immature Granulocytes 1.20 (H) 0.00 - 0.90 %    Nucleated RBC 0.30 (H) 0.00 - 0.20 /100 WBC    Neutrophils (Absolute) 11.46 (H) 1.82 - 7.42 K/uL    Lymphs (Absolute) 2.43 1.00 - 4.80 K/uL    Monos (Absolute) 0.95 (H) 0.00 - 0.85 K/uL    Eos (Absolute) 0.03 0.00 - 0.51 K/uL    Baso (Absolute) 0.06 0.00 - 0.12 K/uL    Immature Granulocytes (abs) 0.18 (H) 0.00 - 0.11 K/uL    NRBC (Absolute) 0.05 K/uL   Comp Metabolic Panel    Collection Time: 04/17/25  2:28 AM   Result Value Ref Range    Sodium 139 135 - 145 mmol/L    Potassium 4.1 3.6 - 5.5 mmol/L    Chloride 104 96 - 112 mmol/L    Co2 21 20 - 33 mmol/L    Anion Gap 14.0 7.0 - 16.0    Glucose 127  (H) 65 - 99 mg/dL    Bun 41 (H) 8 - 22 mg/dL    Creatinine 1.96 (H) 0.50 - 1.40 mg/dL    Calcium 9.0 8.5 - 10.5 mg/dL    Correct Calcium 9.1 8.5 - 10.5 mg/dL    AST(SGOT) 107 (H) 12 - 45 U/L    ALT(SGPT) 134 (H) 2 - 50 U/L    Alkaline Phosphatase 106 (H) 30 - 99 U/L    Total Bilirubin 0.5 0.1 - 1.5 mg/dL    Albumin 3.9 3.2 - 4.9 g/dL    Total Protein 7.1 6.0 - 8.2 g/dL    Globulin 3.2 1.9 - 3.5 g/dL    A-G Ratio 1.2 g/dL   proBrain Natriuretic Peptide, NT    Collection Time: 25  2:28 AM   Result Value Ref Range    NT-proBNP 66424 (H) 0 - 125 pg/mL   Troponin    Collection Time: 25  2:28 AM   Result Value Ref Range    Troponin T 23 (H) 6 - 19 ng/L   ESTIMATED GFR    Collection Time: 25  2:28 AM   Result Value Ref Range    GFR (CKD-EPI) 40 (A) >60 mL/min/1.73 m 2   EKG    Collection Time: 25  3:17 AM   Result Value Ref Range    Report       [facility] Emergency Dept.    Test Date:  2025  Pt Name:    LAVELL SPRINGER                 Department: ER  MRN:        [id number]                      Room:  Gender:     Male                         Technician: [id number]  :        1970                   Requested By:ER TRIAGE PROTOCOL  Order #:    [id number]                    Reading MD: Leon Mcgraw II, MD    Measurements  Intervals                                Axis  Rate:       107                          P:          82  CA:         152                          QRS:        15  QRSD:       111                          T:          134  QT:         393  QTc:        525    Interpretive Statements  Sinus tachycardia  Multiple premature complexes, vent & supraven  Probable left atrial enlargement  Left ventricular hypertrophy  Nonspecific T abnormalities, lateral leads  Prolonged QT interval  Impression: SInus tachycardia with PVCs, prolonged QT. No STEMI  Compared to ECG 2024 17:39:16  T-wave abnorm ality now present  Prolonged QT interval now present  Sinus rhythm no  longer present  Early repolarization no longer present  Electronically Signed On 04- 03:17:27 PDT by Leon Mcgraw II, MD        I have independently interpreted this EKG     RADIOLOGY/PROCEDURES   The attending emergency physician has independently interpreted the diagnostic imaging associated with this visit and am waiting the final reading from the radiologist.   My preliminary interpretation is a follows: Enlarged heart, no focal pneumonia. No obvious pulmonary edema.     Radiologist interpretation:  DX-CHEST-PORTABLE (1 VIEW)   Final Result         1.  No acute cardiopulmonary disease.   2.  Cardiomegaly      EC-ECHOCARDIOGRAM COMPLETE W/O CONT    (Results Pending)     COURSE & MEDICAL DECISION MAKING     ASSESSMENT, COURSE AND PLAN  Care Narrative:     2:34 AM - This is an emergency department evaluation of a 54 y.o. male who presents with shortness of breath worse with exertion and laying flat. He was told not to take lasix a few days ago because he was put on steroids for gout. Since then he has had worsening shortness of breath and weakness. Ordered CMP, troponin, CBC with diff, ProBNP NT, CXR, and EKG to evaluate. The patient will be medicated with lasix 40 mg IV.     4:28 AM - The patient was reevaluated at beside. When I came into the room the patient was asleep laying more supine with oxygen into the low 80s. After waking he sat up and was able to recover. The patient has been ambulatory and able to provide urine sample. No chest pain with deep inspiration. The patient is amenable to hospitalization.     4:33 AM - Paged hospitalist. The patient is hypertensive and I will discuss medication with pharmacy. Need update medications, unclear if he is still taking entresto.     5:02 AM - I discussed the patient's case and the above findings with Dr. Alexander (Hospitalist) who agrees to evaluate the patient for hospitalization.       CRITICAL CARE  The very real possibilty of a deterioration of  this patient's condition required the highest level of my preparedness for sudden, emergent intervention.  I provided critical care services, which included medication orders, frequent reevaluations of the patient's condition and response to treatment, ordering and reviewing test results, and discussing the case with various consultants.  The critical care time associated with the care of the patient was 30 minutes. Review chart for interventions. This time is exclusive of any other billable procedures.         PROBLEM LIST  #Acute CHF exacerbation with hypoxic respiratory failure   -symptoms were persistent and he was hypoxic when laying down and very dyspneic    -hospitalized for diuresis.     #Hypertension   -blood pressure has improved with diuresis    DISPOSITION AND DISCUSSIONS  I have discussed management of the patient with the following physicians and RUMA's:  Dr. Alexander (Hospitalist)    Discussion of management with other [facility] or appropriate source(s): Pharmacy Tommy.       DISPOSITION:  Patient will be hospitalized by Dr. Alexander in guarded condition.     FINAL DIAGNOSIS  1. Acute on chronic congestive heart failure, unspecified heart failure type (HCC)    2. Hypertension, unspecified type    3. Hypoxia      Epi SCANLON (Abisai), am scribing for, and in the presence of, AISHA Sung II.    Electronically signed by: Epi Massey (Abisai), 4/17/2025    Leon SCANLON II, M* personally performed the services described in this documentation, as scribed by Epi Massey in my presence, and it is both accurate and complete.     The note accurately reflects work and decisions made by me.  Leon Mcgraw II, M.D.  4/17/2025  6:34 AM

## 2025-04-17 NOTE — ASSESSMENT & PLAN NOTE
History of coronary artery disease complicated by STEMI in 2010 status post balloon angioplasty  - Unclear if patient is on aspirin, will restart

## 2025-04-17 NOTE — HOSPITAL COURSE
This is a 54-year-old male with past medical history of ischemic cardiomyopathy with recovered LVEF, AICD, CAD s/p BOGDAN in LAD, dyslipidemia on nexlizet, hx of LV thrombus s/p coumadin, IGT, CKD and gout who was admitted on 4/17 with acute on chronic heart failure exacerbation.    Patient noted to have a gout flare, he stopped all his heart failure medications resulting in shortness of breath and pulmonary edema.  Patient started on aggressive IV diuresis.  Counseled patient at length in regards to the importance of medication compliance.    Patient was adequately diuresed, he reports his dry weight is roughly about 155 to 160 pounds, here he is 149.  He will continue with his oral diuretics.  Stressed the importance of medication compliance and outpatient follow-up with cardiology.    Patient has baseline CKD, he follows up very closely with his nephrologist every 3 months, encouraged to continue follow-up.

## 2025-04-17 NOTE — ED NOTES
Medication history reviewed with patient.  Med rec is complete.  Allergies reviewed.    Patient denies any outpatient antibiotics/anticoagulants in the last 30 days.     Dispense history available in EPIC? None    Patient reports he was told to hold most of his medications while on steroid course       Maria L Kim PhT

## 2025-04-17 NOTE — PROGRESS NOTES
Report received from NOC RN and assumed patient care at 0700. Patient is A&Ox 4, on 2 lpm via nasal cannula . Patient reporting a pain level of 0/10. Call light within reach and bed in lowest position. Reinforced the need to call for assistance. Plan of care discussed and patient does not have any further needs at this time.

## 2025-04-18 ENCOUNTER — APPOINTMENT (OUTPATIENT)
Dept: RADIOLOGY | Facility: MEDICAL CENTER | Age: 55
DRG: 291 | End: 2025-04-18
Attending: STUDENT IN AN ORGANIZED HEALTH CARE EDUCATION/TRAINING PROGRAM
Payer: COMMERCIAL

## 2025-04-18 LAB
ALBUMIN SERPL BCP-MCNC: 3.6 G/DL (ref 3.2–4.9)
ALBUMIN/GLOB SERPL: 1.1 G/DL
ALP SERPL-CCNC: 101 U/L (ref 30–99)
ALT SERPL-CCNC: 117 U/L (ref 2–50)
ANION GAP SERPL CALC-SCNC: 16 MMOL/L (ref 7–16)
AST SERPL-CCNC: 71 U/L (ref 12–45)
BILIRUB SERPL-MCNC: 0.8 MG/DL (ref 0.1–1.5)
BUN SERPL-MCNC: 50 MG/DL (ref 8–22)
CALCIUM ALBUM COR SERPL-MCNC: 9 MG/DL (ref 8.5–10.5)
CALCIUM SERPL-MCNC: 8.7 MG/DL (ref 8.5–10.5)
CHLORIDE SERPL-SCNC: 99 MMOL/L (ref 96–112)
CO2 SERPL-SCNC: 23 MMOL/L (ref 20–33)
CREAT SERPL-MCNC: 1.94 MG/DL (ref 0.5–1.4)
EKG IMPRESSION: NORMAL
ERYTHROCYTE [DISTWIDTH] IN BLOOD BY AUTOMATED COUNT: 46.5 FL (ref 35.9–50)
GFR SERPLBLD CREATININE-BSD FMLA CKD-EPI: 40 ML/MIN/1.73 M 2
GLOBULIN SER CALC-MCNC: 3.4 G/DL (ref 1.9–3.5)
GLUCOSE SERPL-MCNC: 120 MG/DL (ref 65–99)
HCT VFR BLD AUTO: 48.1 % (ref 42–52)
HGB BLD-MCNC: 16.2 G/DL (ref 14–18)
MCH RBC QN AUTO: 30.9 PG (ref 27–33)
MCHC RBC AUTO-ENTMCNC: 33.7 G/DL (ref 32.3–36.5)
MCV RBC AUTO: 91.8 FL (ref 81.4–97.8)
PHOSPHATE SERPL-MCNC: 4.1 MG/DL (ref 2.5–4.5)
PLATELET # BLD AUTO: 300 K/UL (ref 164–446)
PMV BLD AUTO: 9.9 FL (ref 9–12.9)
POTASSIUM SERPL-SCNC: 3.5 MMOL/L (ref 3.6–5.5)
PROCALCITONIN SERPL-MCNC: 0.33 NG/ML
PROT SERPL-MCNC: 7 G/DL (ref 6–8.2)
RBC # BLD AUTO: 5.24 M/UL (ref 4.7–6.1)
SODIUM SERPL-SCNC: 138 MMOL/L (ref 135–145)
TROPONIN T SERPL-MCNC: 16 NG/L (ref 6–19)
TSH SERPL DL<=0.005 MIU/L-ACNC: 0.66 UIU/ML (ref 0.38–5.33)
WBC # BLD AUTO: 18.2 K/UL (ref 4.8–10.8)

## 2025-04-18 PROCEDURE — 93005 ELECTROCARDIOGRAM TRACING: CPT | Mod: TC | Performed by: STUDENT IN AN ORGANIZED HEALTH CARE EDUCATION/TRAINING PROGRAM

## 2025-04-18 PROCEDURE — 700111 HCHG RX REV CODE 636 W/ 250 OVERRIDE (IP): Mod: JZ

## 2025-04-18 PROCEDURE — A9270 NON-COVERED ITEM OR SERVICE: HCPCS

## 2025-04-18 PROCEDURE — 93010 ELECTROCARDIOGRAM REPORT: CPT | Performed by: INTERNAL MEDICINE

## 2025-04-18 PROCEDURE — 86316 IMMUNOASSAY TUMOR OTHER: CPT

## 2025-04-18 PROCEDURE — 700102 HCHG RX REV CODE 250 W/ 637 OVERRIDE(OP)

## 2025-04-18 PROCEDURE — 99418 PROLNG IP/OBS E/M EA 15 MIN: CPT | Performed by: STUDENT IN AN ORGANIZED HEALTH CARE EDUCATION/TRAINING PROGRAM

## 2025-04-18 PROCEDURE — 84443 ASSAY THYROID STIM HORMONE: CPT

## 2025-04-18 PROCEDURE — 84484 ASSAY OF TROPONIN QUANT: CPT

## 2025-04-18 PROCEDURE — 80053 COMPREHEN METABOLIC PANEL: CPT

## 2025-04-18 PROCEDURE — 36415 COLL VENOUS BLD VENIPUNCTURE: CPT

## 2025-04-18 PROCEDURE — 770006 HCHG ROOM/CARE - MED/SURG/GYN SEMI*

## 2025-04-18 PROCEDURE — 84145 PROCALCITONIN (PCT): CPT

## 2025-04-18 PROCEDURE — 74177 CT ABD & PELVIS W/CONTRAST: CPT

## 2025-04-18 PROCEDURE — 700102 HCHG RX REV CODE 250 W/ 637 OVERRIDE(OP): Performed by: STUDENT IN AN ORGANIZED HEALTH CARE EDUCATION/TRAINING PROGRAM

## 2025-04-18 PROCEDURE — 85027 COMPLETE CBC AUTOMATED: CPT

## 2025-04-18 PROCEDURE — 99233 SBSQ HOSP IP/OBS HIGH 50: CPT | Performed by: STUDENT IN AN ORGANIZED HEALTH CARE EDUCATION/TRAINING PROGRAM

## 2025-04-18 PROCEDURE — A9270 NON-COVERED ITEM OR SERVICE: HCPCS | Performed by: STUDENT IN AN ORGANIZED HEALTH CARE EDUCATION/TRAINING PROGRAM

## 2025-04-18 PROCEDURE — 84100 ASSAY OF PHOSPHORUS: CPT

## 2025-04-18 PROCEDURE — 83835 ASSAY OF METANEPHRINES: CPT

## 2025-04-18 PROCEDURE — 700117 HCHG RX CONTRAST REV CODE 255: Performed by: STUDENT IN AN ORGANIZED HEALTH CARE EDUCATION/TRAINING PROGRAM

## 2025-04-18 RX ORDER — POTASSIUM CHLORIDE 1500 MG/1
40 TABLET, EXTENDED RELEASE ORAL ONCE
Status: COMPLETED | OUTPATIENT
Start: 2025-04-18 | End: 2025-04-18

## 2025-04-18 RX ADMIN — METHYLPREDNISOLONE 16 MG: 16 TABLET ORAL at 06:27

## 2025-04-18 RX ADMIN — SACUBITRIL AND VALSARTAN 1 TABLET: 24; 26 TABLET, FILM COATED ORAL at 17:41

## 2025-04-18 RX ADMIN — CARVEDILOL 25 MG: 25 TABLET, FILM COATED ORAL at 17:42

## 2025-04-18 RX ADMIN — SACUBITRIL AND VALSARTAN 1 TABLET: 24; 26 TABLET, FILM COATED ORAL at 06:27

## 2025-04-18 RX ADMIN — POTASSIUM CHLORIDE 40 MEQ: 1500 TABLET, EXTENDED RELEASE ORAL at 17:42

## 2025-04-18 RX ADMIN — CARVEDILOL 25 MG: 25 TABLET, FILM COATED ORAL at 09:10

## 2025-04-18 RX ADMIN — FUROSEMIDE 40 MG: 10 INJECTION, SOLUTION INTRAVENOUS at 06:27

## 2025-04-18 RX ADMIN — FUROSEMIDE 40 MG: 10 INJECTION, SOLUTION INTRAVENOUS at 17:41

## 2025-04-18 RX ADMIN — IOHEXOL 95 ML: 350 INJECTION, SOLUTION INTRAVENOUS at 20:45

## 2025-04-18 RX ADMIN — ATORVASTATIN CALCIUM 80 MG: 40 TABLET, FILM COATED ORAL at 17:42

## 2025-04-18 ASSESSMENT — PAIN DESCRIPTION - PAIN TYPE
TYPE: ACUTE PAIN

## 2025-04-18 ASSESSMENT — ENCOUNTER SYMPTOMS
COUGH: 0
VOMITING: 0
ORTHOPNEA: 1
PALPITATIONS: 0
DIARRHEA: 0
NAUSEA: 0
CHILLS: 0
HEARTBURN: 0
HEADACHES: 0
ABDOMINAL PAIN: 0
DIZZINESS: 0
SHORTNESS OF BREATH: 1
FEVER: 0

## 2025-04-18 ASSESSMENT — FIBROSIS 4 INDEX: FIB4 SCORE: 1.18

## 2025-04-18 NOTE — PROGRESS NOTES
"[facility] Medicine Daily Progress Note    Date of Service  4/18/2025    Chief Complaint  Johny Toledo is a 54 y.o. male admitted 4/17/2025 with shortness of breath after stopping heart failure medications.     Hospital Course  Patient is a 54-year-old male with past medical history of chronic heart failure with recovered ejection fraction, hyperlipidemia, hypertension, and narcolepsy who presents due to dyspnea and orthopnea after stopping his heart failure medications. Several days ago, patient states he came into the emergency department with right foot pain found to have gout and was advised to stop his heart failure medications as potential etiology of gout flare and was prescribed Medrol Dosepak. However since then, patient is bored orthopnea, dyspnea on exertion, and worsening heart failure symptoms prompting him to get reevaluated. In the emergency department, patient was found to have tachycardia, tachypnea, transaminitis with AST of 107,  and chest x-ray demonstrating interstitial opacities likely with pulmonary edema. Denies any chest pain. Patient likely has acute on chronic congestive heart failure due to medication noncompliance and will be admitted for diuresis.     Interval Problem Update  Seen patient at bedside.  Reports shortness of breath improving   BNP 05589  Continue IV diuresis  Continue Farxiga, coreg, Entresto  Daily weight  I&O  Weaning oxygen.  Baseline room air  Continue steroid for gout    Around 1400 pm, patient was having episode of severe abdominal pain, hypothermia, then feeling flush, lethargic and states \"feels like heart attack.\"    I went to bedside to evaluate the patient.   VS: temp 92.2, HR 88, Bp 112/82, on 0.5L O2  Patient denies chest pain. Reports lower abdominal pain. Reports lethargic following episodes. Per patient he has had episodes like this in the past. He was following GI and plans colonoscopy.     Plans  - EKG, CT abdomen, troponin  - repeat VS in 30 " mins  - check TSH, metanephrines, ?pheochromocytoma    I have discussed this patient's plan of care and discharge plan at IDT rounds today with Case Management, Nursing, Nursing leadership, and other members of the IDT team.    Consultants/Specialty  none    Code Status  Full Code    Disposition  The patient is not medically cleared for discharge to home or a post-acute facility.      I have placed the appropriate orders for post-discharge needs.    Review of Systems  Review of Systems   Constitutional:  Negative for chills, fever and malaise/fatigue.   Respiratory:  Positive for shortness of breath. Negative for cough.    Cardiovascular:  Positive for orthopnea. Negative for chest pain, palpitations and leg swelling.   Gastrointestinal:  Negative for abdominal pain, diarrhea, heartburn, nausea and vomiting.   Genitourinary:  Negative for dysuria, frequency and urgency.   Neurological:  Negative for dizziness and headaches.   All other systems reviewed and are negative.       Physical Exam  Temp:  [33.4 °C (92.2 °F)-36.4 °C (97.6 °F)] 36.4 °C (97.6 °F)  Pulse:  [61-88] 69  Resp:  [16-20] 18  BP: (103-115)/(68-86) 111/77  SpO2:  [88 %-99 %] 88 %    Physical Exam  Vitals and nursing note reviewed.   Constitutional:       Appearance: Normal appearance. He is not ill-appearing.   HENT:      Head: Normocephalic and atraumatic.      Jaw: There is normal jaw occlusion.      Right Ear: Hearing normal.      Left Ear: Hearing normal.      Nose: Nose normal.      Mouth/Throat:      Lips: Pink.      Mouth: Mucous membranes are moist.   Eyes:      Extraocular Movements: Extraocular movements intact.      Conjunctiva/sclera: Conjunctivae normal.      Pupils: Pupils are equal, round, and reactive to light.   Neck:      Vascular: No carotid bruit.   Cardiovascular:      Rate and Rhythm: Normal rate and regular rhythm.      Pulses: Normal pulses.      Heart sounds: Normal heart sounds, S1 normal and S2 normal.   Pulmonary:       Effort: Pulmonary effort is normal.      Breath sounds: Normal breath sounds and air entry. No stridor.   Musculoskeletal:      Cervical back: Normal range of motion and neck supple.      Right lower leg: No edema.      Left lower leg: No edema.   Skin:     General: Skin is warm and dry.      Capillary Refill: Capillary refill takes less than 2 seconds.   Neurological:      General: No focal deficit present.      Mental Status: He is alert and oriented to person, place, and time. Mental status is at baseline.      Sensory: Sensation is intact.      Motor: Motor function is intact.   Psychiatric:         Attention and Perception: Attention and perception normal.         Mood and Affect: Mood and affect normal.         Speech: Speech normal.         Behavior: Behavior normal. Behavior is cooperative.         Fluids    Intake/Output Summary (Last 24 hours) at 4/18/2025 1622  Last data filed at 4/18/2025 0942  Gross per 24 hour   Intake 960 ml   Output 3460 ml   Net -2500 ml        Laboratory  Recent Labs     04/17/25 0228 04/18/25  0158   WBC 15.1* 18.2*   RBC 4.80 5.24   HEMOGLOBIN 15.4 16.2   HEMATOCRIT 47.1 48.1   MCV 98.1* 91.8   MCH 32.1 30.9   MCHC 32.7 33.7   RDW 52.4* 46.5   PLATELETCT 268 300   MPV 9.8 9.9     Recent Labs     04/17/25 0228 04/18/25  0158   SODIUM 139 138   POTASSIUM 4.1 3.5*   CHLORIDE 104 99   CO2 21 23   GLUCOSE 127* 120*   BUN 41* 50*   CREATININE 1.96* 1.94*   CALCIUM 9.0 8.7                   Imaging  DX-CHEST-PORTABLE (1 VIEW)   Final Result         1.  No acute cardiopulmonary disease.   2.  Cardiomegaly      CT-ABDOMEN-PELVIS WITH    (Results Pending)        Assessment/Plan  * Acute on chronic congestive heart failure with left ventricular diastolic dysfunction (HCC)- (present on admission)  Assessment & Plan  Patient recently had his Lasix discontinued due to potential thought of gout flare etiology from prior ED 4/13/2025 and provided Medrol Dosepak now presenting with orthopnea  and dyspnea on exertion with elevated transaminases likely consistent with acute congestive heart failure  - Patient reports last echocardiogram about 1 month ago as an outpatient with recovered ejection fraction and also had a recent nuclear stress test, we will hold on repeat echocardiogram at this time given recent evaluation  - Lasix 40 mg IV twice daily  - Strict I's and O's, 2 g sodium restriction, 1.5 L fluid restriction  - Patient was advised to stop lasix due to his gout  - Will restart patient on his home medications of entresto, coreg, jardiance    Acute idiopathic gout involving toe of right foot  Assessment & Plan  Patient with previous gout flare put on Medrol Dosepak, can continue however we will also diuresis patient is in acute heart failure    Transaminitis  Assessment & Plan  Likely secondary to volume overload with congestive hepatopathy, diuresis    CAD (coronary artery disease)- (present on admission)  Assessment & Plan  History of coronary artery disease complicated by STEMI in 2010 status post balloon angioplasty  - Unclear if patient is on aspirin, will restart    HTN (hypertension)- (present on admission)  Assessment & Plan  Continue home medications    Narcolepsy with cataplexy- (present on admission)  Assessment & Plan  Adderall was as needed, will hold for now         VTE prophylaxis: lovenox    I have performed a physical exam and reviewed and updated ROS and Plan today (4/18/2025). In review of yesterday's note (4/17/2025), there are no changes except as documented above.    Patient is has a high medical complexity, complex decision making and is at high risk for complication, morbidity, and mortality.  I spent 51 minutes, reviewing the chart, obtaining and/or reviewing separately obtained history. Performing a medically appropriate examination and evaluation.  Counseling and educating the patient. Ordering and reviewing medications, tests, or procedures. Documenting clinical  information in EPIC. Independently interpreting results and communicating results to patient. Discussing future disposition of care with patient, RN and case management.  This does not include time spent on separately billable procedures/tests.    Additional 16 mins spent in the afternoon to reevaluate the patient and order additional labs and imaging.

## 2025-04-18 NOTE — CARE PLAN
The patient is Stable - Low risk of patient condition declining or worsening    Shift Goals  Clinical Goals: resume heart failure medications, wean O2 and keep sats above 90%  Patient Goals: rest, comfort  Family Goals: N/A    Progress made toward(s) clinical / shift goals: Plan of care and medications discussed and reviewed over with pt. Weaned pt down to 0.5L of O2 as tolerated. No SOB or trouble breathing. No pain. Resuming HF medications in the hospital with steroid for gout. Recording I&Os      Patient is not progressing towards the following goals:      Problem: Knowledge Deficit - Standard  Goal: Patient and family/care givers will demonstrate understanding of plan of care, disease process/condition, diagnostic tests and medications  Outcome: Progressing     Problem: Respiratory  Goal: Patient will achieve/maintain optimum respiratory ventilation and gas exchange  Outcome: Progressing     Problem: Pain - Standard  Goal: Alleviation of pain or a reduction in pain to the patient’s comfort goal  Outcome: Progressing     Problem: Cardiac:  Goal: Cardiovascular alteration will improve  Outcome: Progressing  Goal: Hemodynamic stability will improve  Outcome: Progressing

## 2025-04-18 NOTE — PROGRESS NOTES
Pt oral temp of 92.2. Reporting deep lower abdominal pain. MD at bedside.    1415: New orders placed

## 2025-04-18 NOTE — CARE PLAN
The patient is Stable - Low risk of patient condition declining or worsening    Shift Goals  Clinical Goals: Pt will be compliant with HF medications. Pt will wean oxygen.  Patient Goals: Rest  Family Goals: JOSÉ LUIS    Pt is A&Ox4, on 0.5 L via NC, and stable. Pt has remained compliant with medications. All needs are met and questions answered at this time.     Progress made toward(s) clinical / shift goals:    Problem: Knowledge Deficit - Standard  Goal: Patient and family/care givers will demonstrate understanding of plan of care, disease process/condition, diagnostic tests and medications  Outcome: Progressing  Note: Pt verbalizes understanding of POC and status.      Problem: Fluid Volume:  Goal: Risk for excess fluid volume will decrease  Outcome: Progressing  Note: Managed with medications per MAR and maintaining 1.5L fluid restriction.       Patient is not progressing towards the following goals:

## 2025-04-18 NOTE — PROGRESS NOTES
Report received from [facility] KATIE Bess and assumed patient care at 0700. Patient is A&Ox 4, on 0.5 L via NC, and awake and alert. Patient reporting a pain level of 0/10. Pt on 1.5 L fluid restriction. Call light within reach and bed in lowest position. Reinforced the need to call for assistance. Plan of care discussed and patient does not have any further needs at this time.

## 2025-04-19 LAB
ANION GAP SERPL CALC-SCNC: 18 MMOL/L (ref 7–16)
BUN SERPL-MCNC: 68 MG/DL (ref 8–22)
CALCIUM SERPL-MCNC: 8.5 MG/DL (ref 8.5–10.5)
CHLORIDE SERPL-SCNC: 96 MMOL/L (ref 96–112)
CO2 SERPL-SCNC: 20 MMOL/L (ref 20–33)
CREAT SERPL-MCNC: 2.28 MG/DL (ref 0.5–1.4)
ERYTHROCYTE [DISTWIDTH] IN BLOOD BY AUTOMATED COUNT: 46.6 FL (ref 35.9–50)
GFR SERPLBLD CREATININE-BSD FMLA CKD-EPI: 33 ML/MIN/1.73 M 2
GLUCOSE SERPL-MCNC: 126 MG/DL (ref 65–99)
HCT VFR BLD AUTO: 52 % (ref 42–52)
HGB BLD-MCNC: 18.1 G/DL (ref 14–18)
MAGNESIUM SERPL-MCNC: 2.4 MG/DL (ref 1.5–2.5)
MCH RBC QN AUTO: 31.9 PG (ref 27–33)
MCHC RBC AUTO-ENTMCNC: 34.8 G/DL (ref 32.3–36.5)
MCV RBC AUTO: 91.7 FL (ref 81.4–97.8)
PHOSPHATE SERPL-MCNC: 4.5 MG/DL (ref 2.5–4.5)
PLATELET # BLD AUTO: 360 K/UL (ref 164–446)
PMV BLD AUTO: 9.8 FL (ref 9–12.9)
POTASSIUM SERPL-SCNC: 4.2 MMOL/L (ref 3.6–5.5)
RBC # BLD AUTO: 5.67 M/UL (ref 4.7–6.1)
SODIUM SERPL-SCNC: 134 MMOL/L (ref 135–145)
WBC # BLD AUTO: 22.3 K/UL (ref 4.8–10.8)

## 2025-04-19 PROCEDURE — A9270 NON-COVERED ITEM OR SERVICE: HCPCS | Performed by: PHYSICIAN ASSISTANT

## 2025-04-19 PROCEDURE — 770006 HCHG ROOM/CARE - MED/SURG/GYN SEMI*

## 2025-04-19 PROCEDURE — 84100 ASSAY OF PHOSPHORUS: CPT

## 2025-04-19 PROCEDURE — 83735 ASSAY OF MAGNESIUM: CPT

## 2025-04-19 PROCEDURE — 700102 HCHG RX REV CODE 250 W/ 637 OVERRIDE(OP)

## 2025-04-19 PROCEDURE — 36415 COLL VENOUS BLD VENIPUNCTURE: CPT

## 2025-04-19 PROCEDURE — 85027 COMPLETE CBC AUTOMATED: CPT

## 2025-04-19 PROCEDURE — 700102 HCHG RX REV CODE 250 W/ 637 OVERRIDE(OP): Performed by: PHYSICIAN ASSISTANT

## 2025-04-19 PROCEDURE — 99233 SBSQ HOSP IP/OBS HIGH 50: CPT | Performed by: GENERAL PRACTICE

## 2025-04-19 PROCEDURE — 80048 BASIC METABOLIC PNL TOTAL CA: CPT

## 2025-04-19 PROCEDURE — 700111 HCHG RX REV CODE 636 W/ 250 OVERRIDE (IP): Mod: JZ

## 2025-04-19 PROCEDURE — A9270 NON-COVERED ITEM OR SERVICE: HCPCS

## 2025-04-19 RX ORDER — FUROSEMIDE 20 MG/1
20 TABLET ORAL
Status: DISCONTINUED | OUTPATIENT
Start: 2025-04-20 | End: 2025-04-20 | Stop reason: HOSPADM

## 2025-04-19 RX ORDER — EMPAGLIFLOZIN 10 MG/1
10 TABLET, FILM COATED ORAL DAILY
Status: SHIPPED
Start: 2025-04-19

## 2025-04-19 RX ORDER — EZETIMIBE 10 MG/1
10 TABLET ORAL DAILY
Status: SHIPPED
Start: 2025-04-19

## 2025-04-19 RX ADMIN — METHYLPREDNISOLONE 12 MG: 4 TABLET ORAL at 06:23

## 2025-04-19 RX ADMIN — DAPAGLIFLOZIN 10 MG: 10 TABLET, FILM COATED ORAL at 04:48

## 2025-04-19 RX ADMIN — CARVEDILOL 25 MG: 25 TABLET, FILM COATED ORAL at 09:36

## 2025-04-19 RX ADMIN — Medication 5 MG: at 20:47

## 2025-04-19 RX ADMIN — FUROSEMIDE 40 MG: 10 INJECTION, SOLUTION INTRAVENOUS at 04:48

## 2025-04-19 RX ADMIN — CARVEDILOL 25 MG: 25 TABLET, FILM COATED ORAL at 17:41

## 2025-04-19 RX ADMIN — Medication 5 MG: at 01:46

## 2025-04-19 RX ADMIN — SACUBITRIL AND VALSARTAN 1 TABLET: 24; 26 TABLET, FILM COATED ORAL at 17:41

## 2025-04-19 RX ADMIN — SACUBITRIL AND VALSARTAN 1 TABLET: 24; 26 TABLET, FILM COATED ORAL at 04:47

## 2025-04-19 ASSESSMENT — PAIN DESCRIPTION - PAIN TYPE
TYPE: ACUTE PAIN

## 2025-04-19 NOTE — PROGRESS NOTES
Received bedside report from Elda and assumed care at 0705. Pt  asleep.Per report, pt is Aox4 and independent.Call lights and personal belongings within reach. Bed locked and in lowest position.

## 2025-04-19 NOTE — CARE PLAN
Problem: Fluid Volume  Goal: Fluid volume balance will be maintained  Outcome: Progressing     Problem: Pain - Standard  Goal: Alleviation of pain or a reduction in pain to the patient’s comfort goal  Outcome: Progressing   The patient is Stable - Low risk of patient condition declining or worsening    Shift Goals  Clinical Goals: Monitor I&O  Patient Goals: Rest  Family Goals: JOSÉ LUIS    Progress made toward(s) clinical / shift goals:  yes    Patient is not progressing towards the following goals:

## 2025-04-19 NOTE — PROGRESS NOTES
Report received from NOC RN and assumed patient care at 0700. Patient is A&Ox 4, on RA, and awake and alert . Patient reporting a pain level of 0/10. Call light within reach and bed in lowest position. Reinforced the need to call for assistance. Plan of care discussed and patient does not have any further needs at this time.

## 2025-04-19 NOTE — PROGRESS NOTES
Hospital Medicine Daily Progress Note    Date of Service  4/19/2025    Chief Complaint  Johny Toledo is a 54 y.o. male admitted 4/17/2025 with shortness of breath    Hospital Course  This is a 54-year-old male with past medical history of ischemic cardiomyopathy with recovered LVEF, AICD, CAD s/p BOGDAN in LAD, dyslipidemia on nexlizet, hx of LV thrombus s/p coumadin, IGT, CKD and gout who was admitted on 4/17 with acute on chronic heart failure exacerbation.    Patient noted to have a gout flare, he stopped all his heart failure medications resulting in shortness of breath and pulmonary edema.  Patient started on aggressive IV diuresis.  Counseled patient at length in regards to the importance of medication compliance.    Interval Problem Update  Acute on chronic HF -patient reports his dry weight is approximately 155-160.  He is 149 today.  Will discontinue aggressive IV diuresis, continue with oral Lasix as tolerated.    Will monitor patient's renal and liver function labs.    Goat flare - continue with steroid taper     I have discussed this patient's plan of care and discharge plan at IDT rounds today with Case Management, Nursing, Nursing leadership, and other members of the IDT team.    Consultants/Specialty  None    Code Status  Full Code    Disposition  Patient is not medically clear to discharge home.   I have placed the appropriate orders for post-discharge needs.    Review of Systems  Review of Systems   All other systems reviewed and are negative.       Physical Exam  Temp:  [33.4 °C (92.2 °F)-36.6 °C (97.8 °F)] 36.6 °C (97.8 °F)  Pulse:  [69-88] 71  Resp:  [16-20] 16  BP: ()/(62-82) 105/80  SpO2:  [88 %-99 %] 92 %    Physical Exam  Vitals and nursing note reviewed.   Constitutional:       General: He is not in acute distress.     Appearance: Normal appearance.   HENT:      Head: Normocephalic and atraumatic.   Eyes:      Extraocular Movements: Extraocular movements intact.       Conjunctiva/sclera: Conjunctivae normal.      Pupils: Pupils are equal, round, and reactive to light.   Cardiovascular:      Rate and Rhythm: Normal rate and regular rhythm.      Pulses: Normal pulses.      Heart sounds: No murmur heard.     No friction rub. No gallop.   Pulmonary:      Effort: Pulmonary effort is normal. No respiratory distress.      Breath sounds: Normal breath sounds. No wheezing, rhonchi or rales.   Abdominal:      General: Bowel sounds are normal. There is no distension.      Palpations: Abdomen is soft.      Tenderness: There is no abdominal tenderness.   Musculoskeletal:         General: No swelling or tenderness. Normal range of motion.      Cervical back: Normal range of motion and neck supple. No muscular tenderness.      Right lower leg: No edema.      Left lower leg: No edema.   Skin:     General: Skin is warm and dry.      Capillary Refill: Capillary refill takes less than 2 seconds.      Findings: No bruising, erythema or rash.   Neurological:      General: No focal deficit present.      Mental Status: He is alert and oriented to person, place, and time.         Fluids    Intake/Output Summary (Last 24 hours) at 4/19/2025 1238  Last data filed at 4/19/2025 0950  Gross per 24 hour   Intake 1320 ml   Output 3125 ml   Net -1805 ml        Laboratory  Recent Labs     04/17/25  0228 04/18/25  0158 04/19/25  0152   WBC 15.1* 18.2* 22.3*   RBC 4.80 5.24 5.67   HEMOGLOBIN 15.4 16.2 18.1*   HEMATOCRIT 47.1 48.1 52.0   MCV 98.1* 91.8 91.7   MCH 32.1 30.9 31.9   MCHC 32.7 33.7 34.8   RDW 52.4* 46.5 46.6   PLATELETCT 268 300 360   MPV 9.8 9.9 9.8     Recent Labs     04/17/25  0228 04/18/25  0158 04/19/25  0152   SODIUM 139 138 134*   POTASSIUM 4.1 3.5* 4.2   CHLORIDE 104 99 96   CO2 21 23 20   GLUCOSE 127* 120* 126*   BUN 41* 50* 68*   CREATININE 1.96* 1.94* 2.28*   CALCIUM 9.0 8.7 8.5                   Imaging  CT-ABDOMEN-PELVIS WITH   Final Result         1.  Subcentimeter low-density left  hepatic lobe lesion, appearance favors small cyst or hemangioma, otherwise indeterminate.   2.  Symmetrically thickened bilateral adrenal glands, consider adrenal hyperplasia.   3.  Diverticulosis.   4.  Fat-containing umbilical hernia.   5.  Atherosclerosis.            DX-CHEST-PORTABLE (1 VIEW)   Final Result         1.  No acute cardiopulmonary disease.   2.  Cardiomegaly           Assessment/Plan  * Acute on chronic congestive heart failure with left ventricular diastolic dysfunction (HCC)- (present on admission)  Assessment & Plan  Patient recently had his Lasix discontinued due to potential thought of gout flare etiology from prior ED 4/13/2025 and provided Medrol Dosepak now presenting with orthopnea and dyspnea on exertion with elevated transaminases likely consistent with acute congestive heart failure  - Patient reports last echocardiogram about 1 month ago as an outpatient with recovered ejection fraction and also had a recent nuclear stress test, we will hold on repeat echocardiogram at this time given recent evaluation  - Lasix 40 mg IV twice daily  - Strict I's and O's, 2 g sodium restriction, 1.5 L fluid restriction  - Patient was advised to stop lasix due to his gout  - Will restart patient on his home medications of entresto, coreg, jardiance    Patient noted to have a gout flare, he stopped all his heart failure medications resulting in shortness of breath and pulmonary edema.  Patient started on aggressive IV diuresis.  Counseled patient at length in regards to the importance of medication compliance.    Acute idiopathic gout involving toe of right foot  Assessment & Plan  Patient with previous gout flare put on Medrol Dosepak, can continue however we will also diuresis patient is in acute heart failure  Resolving    Transaminitis  Assessment & Plan  Likely secondary to volume overload with congestive hepatopathy, diuresis    CAD (coronary artery disease)- (present on admission)  Assessment &  Plan  History of coronary artery disease complicated by STEMI in 2010 status post balloon angioplasty  - Unclear if patient is on aspirin, will restart    HTN (hypertension)- (present on admission)  Assessment & Plan  Continue home medications    Narcolepsy with cataplexy- (present on admission)  Assessment & Plan  Adderall was as needed, will hold for now         VTE prophylaxis: Lovenox    I have performed a physical exam and reviewed and updated ROS and Plan today (4/19/2025). In review of yesterday's note (4/18/2025), there are no changes except as documented above.    Greater than 51 minutes spent prepping to see patient (e.g. reviewing EMR) obtaining and/or reviewing separately obtained history. Performing a medically appropriate examination and evaluation. Independently interpreting results and communicating results to patient/family/caregiver. Counseling and educating the patient/family/caregiver. Ordering medications, tests, and/or procedures. Referring and communicating with other health care professionals.  Documenting clinical information in EPIC. Care coordination.

## 2025-04-19 NOTE — PROGRESS NOTES
Assumed care of patient at 1900. Received bedside report from day RN Jim. Patient A&Ox4, on RA, Reporting a pain level of 0. Call light within reach, belongings within reach, fall precautions in place, bed in lowest position. Patient does not have any other needs at this time.     POC was discussed with patient. All questions were answered. Patient verbalized understanding.

## 2025-04-19 NOTE — CARE PLAN
The patient is Stable - Low risk of patient condition declining or worsening    Shift Goals  Clinical Goals: Monitor I&O  Patient Goals: Rest  Family Goals: JOSÉ LUIS    Progress made toward(s) clinical / shift goals:    Pt reports 0/10 pain    Patient is not progressing towards the following goals:

## 2025-04-20 VITALS
HEIGHT: 66 IN | BODY MASS INDEX: 24.06 KG/M2 | OXYGEN SATURATION: 94 % | WEIGHT: 149.69 LBS | SYSTOLIC BLOOD PRESSURE: 104 MMHG | DIASTOLIC BLOOD PRESSURE: 67 MMHG | HEART RATE: 71 BPM | TEMPERATURE: 96.7 F | RESPIRATION RATE: 18 BRPM

## 2025-04-20 LAB
ALBUMIN SERPL BCP-MCNC: 3.5 G/DL (ref 3.2–4.9)
ALBUMIN/GLOB SERPL: 1.1 G/DL
ALP SERPL-CCNC: 84 U/L (ref 30–99)
ALT SERPL-CCNC: 73 U/L (ref 2–50)
ANION GAP SERPL CALC-SCNC: 17 MMOL/L (ref 7–16)
AST SERPL-CCNC: 37 U/L (ref 12–45)
BILIRUB SERPL-MCNC: 0.5 MG/DL (ref 0.1–1.5)
BUN SERPL-MCNC: 67 MG/DL (ref 8–22)
CALCIUM ALBUM COR SERPL-MCNC: 8.9 MG/DL (ref 8.5–10.5)
CALCIUM SERPL-MCNC: 8.5 MG/DL (ref 8.5–10.5)
CHLORIDE SERPL-SCNC: 96 MMOL/L (ref 96–112)
CO2 SERPL-SCNC: 24 MMOL/L (ref 20–33)
CREAT SERPL-MCNC: 2.4 MG/DL (ref 0.5–1.4)
GFR SERPLBLD CREATININE-BSD FMLA CKD-EPI: 31 ML/MIN/1.73 M 2
GLOBULIN SER CALC-MCNC: 3.2 G/DL (ref 1.9–3.5)
GLUCOSE SERPL-MCNC: 120 MG/DL (ref 65–99)
MAGNESIUM SERPL-MCNC: 2.4 MG/DL (ref 1.5–2.5)
PHOSPHATE SERPL-MCNC: 4.5 MG/DL (ref 2.5–4.5)
POTASSIUM SERPL-SCNC: 3.8 MMOL/L (ref 3.6–5.5)
PROT SERPL-MCNC: 6.7 G/DL (ref 6–8.2)
SODIUM SERPL-SCNC: 137 MMOL/L (ref 135–145)

## 2025-04-20 PROCEDURE — 700102 HCHG RX REV CODE 250 W/ 637 OVERRIDE(OP)

## 2025-04-20 PROCEDURE — A9270 NON-COVERED ITEM OR SERVICE: HCPCS | Performed by: GENERAL PRACTICE

## 2025-04-20 PROCEDURE — 700102 HCHG RX REV CODE 250 W/ 637 OVERRIDE(OP): Performed by: GENERAL PRACTICE

## 2025-04-20 PROCEDURE — 83735 ASSAY OF MAGNESIUM: CPT

## 2025-04-20 PROCEDURE — 84100 ASSAY OF PHOSPHORUS: CPT

## 2025-04-20 PROCEDURE — 36415 COLL VENOUS BLD VENIPUNCTURE: CPT

## 2025-04-20 PROCEDURE — A9270 NON-COVERED ITEM OR SERVICE: HCPCS

## 2025-04-20 PROCEDURE — 80053 COMPREHEN METABOLIC PANEL: CPT

## 2025-04-20 PROCEDURE — 99239 HOSP IP/OBS DSCHRG MGMT >30: CPT | Performed by: GENERAL PRACTICE

## 2025-04-20 RX ORDER — ACETAMINOPHEN 325 MG/1
650 TABLET ORAL EVERY 6 HOURS PRN
Status: DISCONTINUED | OUTPATIENT
Start: 2025-04-20 | End: 2025-04-20 | Stop reason: HOSPADM

## 2025-04-20 RX ADMIN — METHYLPREDNISOLONE 8 MG: 4 TABLET ORAL at 06:33

## 2025-04-20 RX ADMIN — CARVEDILOL 25 MG: 25 TABLET, FILM COATED ORAL at 07:55

## 2025-04-20 RX ADMIN — SACUBITRIL AND VALSARTAN 1 TABLET: 24; 26 TABLET, FILM COATED ORAL at 06:33

## 2025-04-20 RX ADMIN — FUROSEMIDE 20 MG: 20 TABLET ORAL at 06:33

## 2025-04-20 RX ADMIN — DAPAGLIFLOZIN 10 MG: 10 TABLET, FILM COATED ORAL at 06:33

## 2025-04-20 ASSESSMENT — PAIN DESCRIPTION - PAIN TYPE
TYPE: ACUTE PAIN
TYPE: ACUTE PAIN

## 2025-04-20 NOTE — DOCUMENTATION QUERY
[facility]                                                                       Query Response Note      PATIENT:               LAVELL SPRINGER  ACCT #:                  [id number]  MRN:                     [id number]  :                      1970  ADMIT DATE:       2025 2:04 AM  DISCH DATE:          RESPONDING  PROVIDER #:        [id number]           QUERY TEXT:    PULMONARY EDEMA  is documented in the Medical Record. Please specify the acuity of this condition.    NOTE:  If the appropriate response is not listed below, please respond with a new note.     Note: If a query response diagnosis is provided, please include it in your daily notes & DC Summary    Karlene Field  RN-CDIS  [address] [place] [address]  [phone]  [email or web address]  Connect via [employer] Messenger    The patient's Clinical Indicators include:  chest  x-ray demonstrating interstitial opacities likely with pulmonary edema (HP)   PN  shortness of breath after stopping heart failure medications.    ACUTE ON CHRONIC SYSTOLIC CHF WITH HYPOXIC RESP FAILURE (recovered ejection fraction)-medication noncompliance  PN  left ventricular diastolic dysfunction   hypoxic when laying down and very dyspneic    TX: LASIX, SUPPLEMENTAL O2  Options provided:   -- Acute  ____________   -- Chronic  __________   -- Acute and chronic ___________   -- Acute on chronic ____________      Query created by: Karlene Field on 2025 11:48 AM    RESPONSE TEXT:    Acute on chronic ____________          Electronically signed by:  ADRIÁN KUMAR DO 2025 12:32 AM

## 2025-04-20 NOTE — DISCHARGE SUMMARY
Discharge Summary    CHIEF COMPLAINT ON ADMISSION  Chief Complaint   Patient presents with    Weakness    Shortness of Breath       Reason for Admission  Shortness of Breath; Generalized W*     Admission Date  4/17/2025    CODE STATUS  Full Code    HPI & HOSPITAL COURSE  This is a 54-year-old male with past medical history of ischemic cardiomyopathy with recovered LVEF, AICD, CAD s/p BOGDAN in LAD, dyslipidemia on nexlizet, hx of LV thrombus s/p coumadin, IGT, CKD and gout who was admitted on 4/17 with acute on chronic heart failure exacerbation.    Patient noted to have a gout flare, he stopped all his heart failure medications resulting in shortness of breath and pulmonary edema.  Patient started on aggressive IV diuresis.  Counseled patient at length in regards to the importance of medication compliance.    Patient was adequately diuresed, he reports his dry weight is roughly about 155 to 160 pounds, here he is 149.  He will continue with his oral diuretics.  Stressed the importance of medication compliance and outpatient follow-up with cardiology.    Patient has baseline CKD, he follows up very closely with his nephrologist every 3 months, encouraged to continue follow-up.    Therefore, he is discharged in good and stable condition to home with close outpatient follow-up.    The patient met 2-midnight criteria for an inpatient stay at the time of discharge.    Discharge Date  04/20/25    FOLLOW UP ITEMS POST DISCHARGE  Primary care physician  Cardiology  Nephrology    DISCHARGE DIAGNOSES  Principal Problem:    Acute on chronic congestive heart failure with left ventricular diastolic dysfunction (HCC) (POA: Yes)  Active Problems:    Acute idiopathic gout involving toe of right foot (POA: Unknown)    Transaminitis (POA: Unknown)    Narcolepsy with cataplexy (POA: Yes)    HTN (hypertension) (POA: Yes)    CAD (coronary artery disease) (POA: Yes)  Resolved Problems:    * No resolved hospital problems. *      FOLLOW  UP  [facility]  [address]  [address]  [facility] [id number] [phone]    Follow up      [facility]  [address]  [place] [address]  [phone]  Follow up        MEDICATIONS ON DISCHARGE     Medication List        CONTINUE taking these medications        Instructions   ADDERALL XR (30MG) 30 MG XR capsule  Generic drug: amphetamine-dextroamphetamine ER   Take 30 mg by mouth 2 times a day as needed (Narcolepsy).  Dose: 30 mg     albuterol 108 (90 Base) MCG/ACT Aers inhalation aerosol   Inhale 2 Puffs every 6 hours as needed for Shortness of Breath.  Dose: 2 Puff     atorvastatin 80 MG tablet  Commonly known as: Lipitor   Take 80 mg by mouth every day.  Dose: 80 mg     carvedilol 25 MG Tabs  Commonly known as: Coreg   Take 25 mg by mouth 2 times a day, with meals.  Dose: 25 mg     ezetimibe 10 MG Tabs  Commonly known as: Zetia   Take 1 Tablet by mouth every day. ON HOLD  Dose: 10 mg     furosemide 20 MG Tabs  Commonly known as: Lasix   Take 20 mg by mouth 1 time a day as needed.  Dose: 20 mg     Jardiance 10 MG Tabs tablet  Generic drug: Empagliflozin   Take 1 Tablet by mouth every day. ON HOLD  Dose: 10 mg     methylPREDNISolone 4 MG Tbpk  Commonly known as: Medrol Dosepak   Use as directed     oxyCODONE-acetaminophen 5-325 MG Tabs  Commonly known as: Percocet   Take 1 Tablet by mouth every four hours as needed for Severe Pain for up to 7 days.  Dose: 1 Tablet     sacubitril-valsartan 24-26 MG Tabs  Commonly known as: Entresto   Take 1 Tablet by mouth 2 times a day.  Dose: 1 Tablet     VITAMIN D PO   Take 1 Tablet by mouth 2 times a day.  Dose: 1 Tablet            STOP taking these medications      amLODIPine 5 MG Tabs  Commonly known as: Norvasc              Allergies  No Known Allergies    DIET  Orders Placed This Encounter   Procedures    Diet Order Diet: Cardiac; Second Modifier: (optional): 2 Gram Sodium; Fluid modifications: (optional): 1500 ml Fluid  Restriction     Standing Status:   Standing     Number of Occurrences:   1     Diet::   Cardiac [6]     Second Modifier: (optional):   2 Gram Sodium [7]     Fluid modifications: (optional):   1500 ml Fluid Restriction [9]       ACTIVITY  As tolerated.  Weight bearing as tolerated    CONSULTATIONS  None    PROCEDURES  None    LABORATORY  Lab Results   Component Value Date    SODIUM 137 04/20/2025    POTASSIUM 3.8 04/20/2025    CHLORIDE 96 04/20/2025    CO2 24 04/20/2025    GLUCOSE 120 (H) 04/20/2025    BUN 67 (H) 04/20/2025    CREATININE 2.40 (H) 04/20/2025    CREATININE 1.1 03/16/2009        Lab Results   Component Value Date    WBC 22.3 (H) 04/19/2025    HEMOGLOBIN 18.1 (H) 04/19/2025    HEMATOCRIT 52.0 04/19/2025    PLATELETCT 360 04/19/2025      CT-ABDOMEN-PELVIS WITH   Final Result         1.  Subcentimeter low-density left hepatic lobe lesion, appearance favors small cyst or hemangioma, otherwise indeterminate.   2.  Symmetrically thickened bilateral adrenal glands, consider adrenal hyperplasia.   3.  Diverticulosis.   4.  Fat-containing umbilical hernia.   5.  Atherosclerosis.            DX-CHEST-PORTABLE (1 VIEW)   Final Result         1.  No acute cardiopulmonary disease.   2.  Cardiomegaly           Total time of the discharge process exceeds 45 minutes.

## 2025-04-20 NOTE — PROGRESS NOTES
Pt dc off of unit. All belongings with pt including home medications. Pt states his ride is waiting downstairs and he does not need an escort.

## 2025-04-20 NOTE — PROGRESS NOTES
Report received from NOC RN and assumed patient care at 0700. Patient is A&Ox 4, on RA, and bed alarm is on . Patient declines pain. Call light within reach and bed in lowest position. Reinforced the need to call for assistance. Plan of care discussed and patient does not have any further needs at this time.

## 2025-04-20 NOTE — PROGRESS NOTES
Assumed care of patient at 1900. Received bedside report from day RN Sherin. Patient A&Ox4, on RA, Reporting a pain level of 0. Call light within reach, belongings within reach, fall precautions in place, bed in lowest position. Patient does not have any other needs at this time.     POC was discussed with patient. All questions were answered. Patient verbalized understanding.

## 2025-04-20 NOTE — CARE PLAN
Problem: Knowledge Deficit - Standard  Goal: Patient and family/care givers will demonstrate understanding of plan of care, disease process/condition, diagnostic tests and medications  Outcome: Progressing     Problem: Pain - Standard  Goal: Alleviation of pain or a reduction in pain to the patient’s comfort goal  Outcome: Progressing     Problem: Mobility:  Goal: Capacity to carry out activities will improve  Outcome: Progressing   The patient is Stable - Low risk of patient condition declining or worsening    Shift Goals  Clinical Goals: Monitor I&O  Patient Goals: Rest  Family Goals: JOSÉ LUIS    Progress made toward(s) clinical / shift goals:  Yes    Patient is not progressing towards the following goals:

## 2025-04-21 NOTE — DOCUMENTATION QUERY
[facility]                                                                       Query Response Note      PATIENT:               LAVELL SPRINGER  ACCT #:                  [id number]  MRN:                     [id number]  :                      1970  ADMIT DATE:       2025 2:04 AM  DISCH DATE:        2025 10:20 AM  RESPONDING  PROVIDER #:        [id number]           QUERY TEXT:    Kidney Failure is documented in the Medical Record. Please specify the acuity.    NOTE:  If an appropriate response is not listed below, please respond with a new note.    The patient's Clinical Indicators include:  DCS: Patient has baseline CKD, he follows up very closely with his nephrologist every 3 months    creat 1.96-2.28-2.40; gfr 40-31    Risk Factors: heart failure exacerbation, cad    TX: lasix  Options provided:   -- Acute kidney failure on chronic kidney disease, CKD stage, please specify   -- Chronic kidney disease, please specify stage, please specify Stage   -- Unable to determine      Query created by: Karlene Field on 2025 2:30 PM    RESPONSE TEXT:    CKD stage, please specify          Electronically signed by:  MARIPOSA BELLO MD 2025 4:31 PM

## 2025-04-22 LAB
CGA SERPL-MCNC: 155 NG/ML (ref 0–187)
METANEPHS SERPL-SCNC: 0.33 NMOL/L (ref 0–0.49)
NORMETANEPHRINE SERPL-SCNC: 1.58 NMOL/L (ref 0–0.89)

## 2025-04-28 NOTE — TELEPHONE ENCOUNTER
Patient called in today and stated that he needs a refill on his Adderral (Dextroamphetamine XR). Pharmacy is stating they can't refill because the last rx was for 60 days, but only gave enough pills for 30 days. Patient is requesting a new rx for a 30 day supply. 180 pills. Patient is requesting we call the pharmacy.      [Large Joint Injection] : Large joint injection [Right] : of the right [Knee] : knee [Pain] : pain [Inflammation] : inflammation [X-ray evidence of Osteoarthritis on this or prior visit] : x-ray evidence of Osteoarthritis on this or prior visit [Alcohol] : alcohol [Betadine] : betadine [Ethyl Chloride sprayed topically] : ethyl chloride sprayed topically [Sterile technique used] : sterile technique used [Visco-3 (25mg)] : 25mg of Visco-3 [#3] : series #3 [] : Patient tolerated procedure well [Call if redness, pain or fever occur] : call if redness, pain or fever occur [Apply ice for 15min out of every hour for the next 12-24 hours as tolerated] : apply ice for 15 minutes out of every hour for the next 12-24 hours as tolerated [Previous OTC use and PT nontherapeutic] : patient has tried OTC's including aspirin, Ibuprofen, Aleve, etc or prescription NSAIDS, and/or exercises at home and/or physical therapy without satisfactory response [Patient had decreased mobility in the joint] : patient had decreased mobility in the joint [Risks, benefits, alternatives discussed / Verbal consent obtained] : the risks benefits, and alternatives have been discussed, and verbal consent was obtained [Prior failure or difficult injection] : prior failure or difficult injection [All ultrasound images have been permanently captured and stored accordingly in our picture archiving and communication system] : All ultrasound images have been permanently captured and stored accordingly in our picture archiving and communication system [Visualization of the needle and placement of injection was performed without complication] : visualization of the needle and placement of injection was performed without complication